# Patient Record
Sex: FEMALE | Race: WHITE | Employment: OTHER | URBAN - METROPOLITAN AREA
[De-identification: names, ages, dates, MRNs, and addresses within clinical notes are randomized per-mention and may not be internally consistent; named-entity substitution may affect disease eponyms.]

---

## 2017-01-16 ENCOUNTER — TELEPHONE (OUTPATIENT)
Dept: PHARMACY | Facility: CLINIC | Age: 82
End: 2017-01-16

## 2017-01-24 PROBLEM — I50.22 CHRONIC SYSTOLIC (CONGESTIVE) HEART FAILURE (HCC): Status: ACTIVE | Noted: 2017-01-24

## 2018-01-01 ENCOUNTER — DIRECT ADMIT ORDERS (OUTPATIENT)
Dept: CARDIOLOGY CLINIC | Age: 83
End: 2018-01-01

## 2018-01-01 ENCOUNTER — HOSPITAL ENCOUNTER (INPATIENT)
Age: 83
LOS: 3 days | Discharge: HOSPICE | DRG: 308 | End: 2018-07-22
Attending: EMERGENCY MEDICINE | Admitting: INTERNAL MEDICINE
Payer: MEDICARE

## 2018-01-01 ENCOUNTER — APPOINTMENT (OUTPATIENT)
Dept: GENERAL RADIOLOGY | Age: 83
DRG: 291 | End: 2018-01-01
Payer: MEDICARE

## 2018-01-01 ENCOUNTER — APPOINTMENT (OUTPATIENT)
Dept: ULTRASOUND IMAGING | Age: 83
DRG: 308 | End: 2018-01-01
Payer: MEDICARE

## 2018-01-01 ENCOUNTER — OFFICE VISIT (OUTPATIENT)
Dept: CARDIOLOGY CLINIC | Age: 83
End: 2018-01-01
Payer: MEDICARE

## 2018-01-01 ENCOUNTER — APPOINTMENT (OUTPATIENT)
Dept: GENERAL RADIOLOGY | Age: 83
DRG: 242 | End: 2018-01-01
Payer: MEDICARE

## 2018-01-01 ENCOUNTER — HOSPITAL ENCOUNTER (OUTPATIENT)
Dept: OTHER | Age: 83
Setting detail: THERAPIES SERIES
Discharge: HOME | End: 2018-05-21
Payer: COMMERCIAL

## 2018-01-01 ENCOUNTER — HOSPITAL ENCOUNTER (OUTPATIENT)
Age: 83
Discharge: HOME | End: 2018-06-14
Payer: MEDICARE

## 2018-01-01 ENCOUNTER — ANESTHESIA EVENT (OUTPATIENT)
Dept: NON INVASIVE DIAGNOSTICS | Age: 83
End: 2018-01-01

## 2018-01-01 ENCOUNTER — APPOINTMENT (OUTPATIENT)
Dept: GENERAL RADIOLOGY | Age: 83
DRG: 292 | End: 2018-01-01
Payer: MEDICARE

## 2018-01-01 ENCOUNTER — APPOINTMENT (OUTPATIENT)
Dept: GENERAL RADIOLOGY | Age: 83
DRG: 308 | End: 2018-01-01
Payer: MEDICARE

## 2018-01-01 ENCOUNTER — CARE COORDINATION (OUTPATIENT)
Dept: CARE COORDINATION | Age: 83
End: 2018-01-01

## 2018-01-01 ENCOUNTER — OFFICE VISIT (OUTPATIENT)
Dept: NON INVASIVE DIAGNOSTICS | Age: 83
End: 2018-01-01
Payer: MEDICARE

## 2018-01-01 ENCOUNTER — HOSPITAL ENCOUNTER (OUTPATIENT)
Age: 83
Discharge: HOME | End: 2018-05-21
Payer: MEDICARE

## 2018-01-01 ENCOUNTER — HOSPITAL ENCOUNTER (OUTPATIENT)
Dept: NON INVASIVE DIAGNOSTICS | Age: 83
Discharge: HOME | End: 2018-05-29
Payer: MEDICARE

## 2018-01-01 ENCOUNTER — HOSPITAL ENCOUNTER (OUTPATIENT)
Age: 83
Discharge: HOME | End: 2018-07-16
Payer: MEDICARE

## 2018-01-01 ENCOUNTER — HOSPITAL ENCOUNTER (INPATIENT)
Age: 83
LOS: 12 days | Discharge: NURSING, REHAB OR LONG-TERM CARE | DRG: 242 | End: 2018-07-15
Attending: EMERGENCY MEDICINE | Admitting: INTERNAL MEDICINE
Payer: MEDICARE

## 2018-01-01 ENCOUNTER — ANESTHESIA EVENT (OUTPATIENT)
Dept: CARDIAC CATH/INVASIVE PROCEDURES | Age: 83
DRG: 242 | End: 2018-01-01
Payer: MEDICARE

## 2018-01-01 ENCOUNTER — HOSPITAL ENCOUNTER (INPATIENT)
Age: 83
LOS: 6 days | Discharge: ANOTHER HOSPITAL | DRG: 291 | End: 2018-06-20
Attending: INTERNAL MEDICINE | Admitting: INTERNAL MEDICINE
Payer: MEDICARE

## 2018-01-01 ENCOUNTER — HOSPITAL ENCOUNTER (INPATIENT)
Age: 83
LOS: 4 days | Discharge: HOME | DRG: 292 | End: 2018-05-11
Attending: EMERGENCY MEDICINE | Admitting: INTERNAL MEDICINE
Payer: MEDICARE

## 2018-01-01 ENCOUNTER — HOSPITAL ENCOUNTER (OUTPATIENT)
Dept: OTHER | Age: 83
Setting detail: THERAPIES SERIES
Discharge: HOME | DRG: 242 | End: 2018-07-10
Payer: MEDICARE

## 2018-01-01 ENCOUNTER — ANESTHESIA (OUTPATIENT)
Dept: NON INVASIVE DIAGNOSTICS | Age: 83
End: 2018-01-01

## 2018-01-01 ENCOUNTER — HOSPITAL ENCOUNTER (INPATIENT)
Age: 83
LOS: 7 days | Discharge: ANOTHER HOSPITAL | DRG: 291 | End: 2018-06-14
Attending: EMERGENCY MEDICINE | Admitting: INTERNAL MEDICINE
Payer: MEDICARE

## 2018-01-01 ENCOUNTER — APPOINTMENT (OUTPATIENT)
Dept: CARDIAC CATH/INVASIVE PROCEDURES | Age: 83
DRG: 242 | End: 2018-01-01
Payer: MEDICARE

## 2018-01-01 ENCOUNTER — TELEPHONE (OUTPATIENT)
Dept: CARDIOLOGY CLINIC | Age: 83
End: 2018-01-01

## 2018-01-01 ENCOUNTER — HOSPITAL ENCOUNTER (OUTPATIENT)
Dept: OTHER | Age: 83
Setting detail: THERAPIES SERIES
Discharge: HOME | DRG: 242 | End: 2018-07-02
Payer: MEDICARE

## 2018-01-01 ENCOUNTER — TELEPHONE (OUTPATIENT)
Dept: PHARMACY | Facility: CLINIC | Age: 83
End: 2018-01-01

## 2018-01-01 ENCOUNTER — ANESTHESIA (OUTPATIENT)
Dept: CARDIAC CATH/INVASIVE PROCEDURES | Age: 83
DRG: 242 | End: 2018-01-01
Payer: MEDICARE

## 2018-01-01 ENCOUNTER — APPOINTMENT (OUTPATIENT)
Dept: GENERAL RADIOLOGY | Age: 83
DRG: 291 | End: 2018-01-01
Attending: INTERNAL MEDICINE
Payer: MEDICARE

## 2018-01-01 ENCOUNTER — HOSPITAL ENCOUNTER (OUTPATIENT)
Age: 83
Discharge: HOME | End: 2018-07-18
Payer: MEDICARE

## 2018-01-01 VITALS
SYSTOLIC BLOOD PRESSURE: 80 MMHG | HEIGHT: 70 IN | BODY MASS INDEX: 25.91 KG/M2 | HEART RATE: 110 BPM | WEIGHT: 181 LBS | DIASTOLIC BLOOD PRESSURE: 60 MMHG | RESPIRATION RATE: 14 BRPM

## 2018-01-01 VITALS
RESPIRATION RATE: 46 BRPM | DIASTOLIC BLOOD PRESSURE: 48 MMHG | OXYGEN SATURATION: 99 % | SYSTOLIC BLOOD PRESSURE: 90 MMHG

## 2018-01-01 VITALS
WEIGHT: 170 LBS | RESPIRATION RATE: 16 BRPM | SYSTOLIC BLOOD PRESSURE: 114 MMHG | DIASTOLIC BLOOD PRESSURE: 60 MMHG | HEART RATE: 74 BPM | HEIGHT: 70 IN | BODY MASS INDEX: 24.34 KG/M2

## 2018-01-01 VITALS
TEMPERATURE: 97.4 F | SYSTOLIC BLOOD PRESSURE: 104 MMHG | RESPIRATION RATE: 16 BRPM | WEIGHT: 183 LBS | HEIGHT: 70 IN | OXYGEN SATURATION: 98 % | HEART RATE: 112 BPM | DIASTOLIC BLOOD PRESSURE: 63 MMHG | BODY MASS INDEX: 26.2 KG/M2

## 2018-01-01 VITALS
WEIGHT: 177 LBS | OXYGEN SATURATION: 98 % | HEIGHT: 70 IN | HEART RATE: 83 BPM | DIASTOLIC BLOOD PRESSURE: 56 MMHG | RESPIRATION RATE: 18 BRPM | BODY MASS INDEX: 25.34 KG/M2 | SYSTOLIC BLOOD PRESSURE: 94 MMHG

## 2018-01-01 VITALS
TEMPERATURE: 98.1 F | RESPIRATION RATE: 18 BRPM | BODY MASS INDEX: 25.07 KG/M2 | HEART RATE: 91 BPM | HEIGHT: 70 IN | WEIGHT: 175.1 LBS | OXYGEN SATURATION: 93 % | SYSTOLIC BLOOD PRESSURE: 91 MMHG | DIASTOLIC BLOOD PRESSURE: 71 MMHG

## 2018-01-01 VITALS
OXYGEN SATURATION: 100 % | RESPIRATION RATE: 16 BRPM | HEIGHT: 70 IN | WEIGHT: 198 LBS | TEMPERATURE: 97.4 F | BODY MASS INDEX: 28.35 KG/M2 | SYSTOLIC BLOOD PRESSURE: 95 MMHG | HEART RATE: 46 BPM | DIASTOLIC BLOOD PRESSURE: 58 MMHG

## 2018-01-01 VITALS
TEMPERATURE: 98 F | HEIGHT: 70 IN | OXYGEN SATURATION: 98 % | HEART RATE: 95 BPM | RESPIRATION RATE: 20 BRPM | WEIGHT: 175.3 LBS | DIASTOLIC BLOOD PRESSURE: 56 MMHG | BODY MASS INDEX: 25.09 KG/M2 | SYSTOLIC BLOOD PRESSURE: 82 MMHG

## 2018-01-01 VITALS
HEART RATE: 90 BPM | RESPIRATION RATE: 20 BRPM | SYSTOLIC BLOOD PRESSURE: 97 MMHG | DIASTOLIC BLOOD PRESSURE: 57 MMHG | WEIGHT: 181 LBS | BODY MASS INDEX: 25.97 KG/M2

## 2018-01-01 VITALS
BODY MASS INDEX: 25.91 KG/M2 | TEMPERATURE: 98.1 F | RESPIRATION RATE: 16 BRPM | HEART RATE: 82 BPM | WEIGHT: 181 LBS | DIASTOLIC BLOOD PRESSURE: 67 MMHG | SYSTOLIC BLOOD PRESSURE: 97 MMHG | OXYGEN SATURATION: 94 % | HEIGHT: 70 IN

## 2018-01-01 VITALS
TEMPERATURE: 97.8 F | SYSTOLIC BLOOD PRESSURE: 102 MMHG | HEIGHT: 70 IN | BODY MASS INDEX: 24.34 KG/M2 | HEART RATE: 56 BPM | OXYGEN SATURATION: 94 % | WEIGHT: 170 LBS | RESPIRATION RATE: 21 BRPM | DIASTOLIC BLOOD PRESSURE: 54 MMHG

## 2018-01-01 VITALS
SYSTOLIC BLOOD PRESSURE: 91 MMHG | DIASTOLIC BLOOD PRESSURE: 62 MMHG | OXYGEN SATURATION: 100 % | RESPIRATION RATE: 18 BRPM

## 2018-01-01 DIAGNOSIS — I50.9 ACUTE CONGESTIVE HEART FAILURE, UNSPECIFIED CONGESTIVE HEART FAILURE TYPE: ICD-10-CM

## 2018-01-01 DIAGNOSIS — I34.0 SEVERE MITRAL REGURGITATION: ICD-10-CM

## 2018-01-01 DIAGNOSIS — I48.19 PERSISTENT ATRIAL FIBRILLATION (HCC): Primary | ICD-10-CM

## 2018-01-01 DIAGNOSIS — J90 PLEURAL EFFUSION, BILATERAL: ICD-10-CM

## 2018-01-01 DIAGNOSIS — R57.0 CARDIOGENIC SHOCK (HCC): ICD-10-CM

## 2018-01-01 DIAGNOSIS — N18.4 CKD (CHRONIC KIDNEY DISEASE) STAGE 4, GFR 15-29 ML/MIN (HCC): ICD-10-CM

## 2018-01-01 DIAGNOSIS — I48.91 ATRIAL FIBRILLATION WITH RAPID VENTRICULAR RESPONSE (HCC): Primary | ICD-10-CM

## 2018-01-01 DIAGNOSIS — I49.5 SINUS NODE DYSFUNCTION (HCC): ICD-10-CM

## 2018-01-01 DIAGNOSIS — D64.9 CHRONIC ANEMIA: ICD-10-CM

## 2018-01-01 DIAGNOSIS — N17.9 AKI (ACUTE KIDNEY INJURY) (HCC): ICD-10-CM

## 2018-01-01 DIAGNOSIS — N18.4 CHRONIC RENAL FAILURE, STAGE 4 (SEVERE) (HCC): ICD-10-CM

## 2018-01-01 DIAGNOSIS — R57.9 SHOCK (HCC): ICD-10-CM

## 2018-01-01 DIAGNOSIS — I48.0 PAROXYSMAL ATRIAL FIBRILLATION (HCC): Primary | ICD-10-CM

## 2018-01-01 DIAGNOSIS — I50.43 ACUTE ON CHRONIC COMBINED SYSTOLIC AND DIASTOLIC CHF (CONGESTIVE HEART FAILURE) (HCC): ICD-10-CM

## 2018-01-01 DIAGNOSIS — I42.0 DILATED CARDIOMYOPATHY (HCC): ICD-10-CM

## 2018-01-01 DIAGNOSIS — I50.43 ACUTE ON CHRONIC COMBINED SYSTOLIC AND DIASTOLIC CHF (CONGESTIVE HEART FAILURE) (HCC): Primary | ICD-10-CM

## 2018-01-01 DIAGNOSIS — I48.0 PAF (PAROXYSMAL ATRIAL FIBRILLATION) (HCC): ICD-10-CM

## 2018-01-01 DIAGNOSIS — I48.0 PAROXYSMAL ATRIAL FIBRILLATION (HCC): ICD-10-CM

## 2018-01-01 DIAGNOSIS — R77.8 ELEVATED TROPONIN: ICD-10-CM

## 2018-01-01 DIAGNOSIS — N39.0 URINARY TRACT INFECTION WITHOUT HEMATURIA, SITE UNSPECIFIED: Primary | ICD-10-CM

## 2018-01-01 DIAGNOSIS — I50.23 ACUTE ON CHRONIC SYSTOLIC HEART FAILURE (HCC): Primary | ICD-10-CM

## 2018-01-01 DIAGNOSIS — I48.19 PERSISTENT ATRIAL FIBRILLATION (HCC): ICD-10-CM

## 2018-01-01 DIAGNOSIS — E87.5 POTASSIUM (K) EXCESS: ICD-10-CM

## 2018-01-01 DIAGNOSIS — E87.5 HYPERKALEMIA: ICD-10-CM

## 2018-01-01 DIAGNOSIS — I50.9 CONGESTIVE HEART FAILURE, UNSPECIFIED CONGESTIVE HEART FAILURE CHRONICITY, UNSPECIFIED CONGESTIVE HEART FAILURE TYPE: ICD-10-CM

## 2018-01-01 DIAGNOSIS — I44.7 LEFT BUNDLE BRANCH BLOCK: ICD-10-CM

## 2018-01-01 DIAGNOSIS — E03.9 HYPOTHYROIDISM, UNSPECIFIED TYPE: ICD-10-CM

## 2018-01-01 DIAGNOSIS — I50.9 CONGESTIVE HEART FAILURE, UNSPECIFIED CONGESTIVE HEART FAILURE CHRONICITY, UNSPECIFIED CONGESTIVE HEART FAILURE TYPE: Primary | ICD-10-CM

## 2018-01-01 DIAGNOSIS — I95.9 HYPOTENSION, UNSPECIFIED HYPOTENSION TYPE: ICD-10-CM

## 2018-01-01 DIAGNOSIS — I10 ESSENTIAL HYPERTENSION: ICD-10-CM

## 2018-01-01 DIAGNOSIS — N18.9 CHRONIC KIDNEY DISEASE, UNSPECIFIED CKD STAGE: ICD-10-CM

## 2018-01-01 DIAGNOSIS — E87.20 LACTIC ACIDOSIS: ICD-10-CM

## 2018-01-01 DIAGNOSIS — I50.9 ACUTE ON CHRONIC CONGESTIVE HEART FAILURE, UNSPECIFIED CONGESTIVE HEART FAILURE TYPE: ICD-10-CM

## 2018-01-01 DIAGNOSIS — I42.8 NONISCHEMIC CARDIOMYOPATHY (HCC): ICD-10-CM

## 2018-01-01 DIAGNOSIS — I47.20 VENTRICULAR TACHYCARDIA: Primary | ICD-10-CM

## 2018-01-01 LAB
ALBUMIN SERPL-MCNC: 2.7 G/DL (ref 3.5–5.2)
ALBUMIN SERPL-MCNC: 2.8 G/DL (ref 3.5–5.2)
ALBUMIN SERPL-MCNC: 2.9 G/DL (ref 3.5–5.2)
ALBUMIN SERPL-MCNC: 3 G/DL (ref 3.5–5.2)
ALBUMIN SERPL-MCNC: 3.1 G/DL (ref 3.5–5.2)
ALBUMIN SERPL-MCNC: 3.2 G/DL (ref 3.5–5.2)
ALBUMIN SERPL-MCNC: 3.3 G/DL (ref 3.5–5.2)
ALBUMIN SERPL-MCNC: 3.4 G/DL (ref 3.5–5.2)
ALBUMIN SERPL-MCNC: 3.5 G/DL (ref 3.5–5.2)
ALBUMIN SERPL-MCNC: 3.6 G/DL (ref 3.5–5.2)
ALBUMIN SERPL-MCNC: 3.9 G/DL (ref 3.5–5.2)
ALBUMIN SERPL-MCNC: 4.1 G/DL (ref 3.5–5.2)
ALP BLD-CCNC: 44 U/L (ref 35–104)
ALP BLD-CCNC: 46 U/L (ref 35–104)
ALP BLD-CCNC: 46 U/L (ref 35–104)
ALP BLD-CCNC: 47 U/L (ref 35–104)
ALP BLD-CCNC: 47 U/L (ref 35–104)
ALP BLD-CCNC: 48 U/L (ref 35–104)
ALP BLD-CCNC: 49 U/L (ref 35–104)
ALP BLD-CCNC: 50 U/L (ref 35–104)
ALP BLD-CCNC: 51 U/L (ref 35–104)
ALP BLD-CCNC: 52 U/L (ref 35–104)
ALP BLD-CCNC: 54 U/L (ref 35–104)
ALP BLD-CCNC: 59 U/L (ref 35–104)
ALP BLD-CCNC: 61 U/L (ref 35–104)
ALP BLD-CCNC: 61 U/L (ref 35–104)
ALP BLD-CCNC: 63 U/L (ref 35–104)
ALP BLD-CCNC: 65 U/L (ref 35–104)
ALP BLD-CCNC: 67 U/L (ref 35–104)
ALP BLD-CCNC: 68 U/L (ref 35–104)
ALP BLD-CCNC: 68 U/L (ref 35–104)
ALP BLD-CCNC: 77 U/L (ref 35–104)
ALP BLD-CCNC: 78 U/L (ref 35–104)
ALT SERPL-CCNC: 116 U/L (ref 0–32)
ALT SERPL-CCNC: 15 U/L (ref 0–32)
ALT SERPL-CCNC: 16 U/L (ref 0–32)
ALT SERPL-CCNC: 16 U/L (ref 0–32)
ALT SERPL-CCNC: 164 U/L (ref 0–32)
ALT SERPL-CCNC: 17 U/L (ref 0–32)
ALT SERPL-CCNC: 18 U/L (ref 0–32)
ALT SERPL-CCNC: 20 U/L (ref 0–32)
ALT SERPL-CCNC: 20 U/L (ref 0–32)
ALT SERPL-CCNC: 201 U/L (ref 0–32)
ALT SERPL-CCNC: 22 U/L (ref 0–32)
ALT SERPL-CCNC: 23 U/L (ref 0–32)
ALT SERPL-CCNC: 25 U/L (ref 0–32)
ALT SERPL-CCNC: 25 U/L (ref 0–32)
ALT SERPL-CCNC: 265 U/L (ref 0–32)
ALT SERPL-CCNC: 27 U/L (ref 0–32)
ALT SERPL-CCNC: 31 U/L (ref 0–32)
ALT SERPL-CCNC: 34 U/L (ref 0–32)
ALT SERPL-CCNC: 341 U/L (ref 0–32)
ALT SERPL-CCNC: 44 U/L (ref 0–32)
ALT SERPL-CCNC: 440 U/L (ref 0–32)
ALT SERPL-CCNC: 54 U/L (ref 0–32)
ALT SERPL-CCNC: 549 U/L (ref 0–32)
ALT SERPL-CCNC: 587 U/L (ref 0–32)
ANION GAP SERPL CALCULATED.3IONS-SCNC: 10 MMOL/L (ref 7–16)
ANION GAP SERPL CALCULATED.3IONS-SCNC: 10 MMOL/L (ref 7–16)
ANION GAP SERPL CALCULATED.3IONS-SCNC: 11 MMOL/L (ref 7–16)
ANION GAP SERPL CALCULATED.3IONS-SCNC: 12 MMOL/L (ref 7–16)
ANION GAP SERPL CALCULATED.3IONS-SCNC: 13 MMOL/L (ref 7–16)
ANION GAP SERPL CALCULATED.3IONS-SCNC: 14 MMOL/L (ref 7–16)
ANION GAP SERPL CALCULATED.3IONS-SCNC: 15 MMOL/L (ref 7–16)
ANION GAP SERPL CALCULATED.3IONS-SCNC: 16 MMOL/L (ref 7–16)
ANION GAP SERPL CALCULATED.3IONS-SCNC: 17 MMOL/L (ref 7–16)
ANION GAP SERPL CALCULATED.3IONS-SCNC: 18 MMOL/L (ref 7–16)
ANION GAP SERPL CALCULATED.3IONS-SCNC: 19 MMOL/L (ref 7–16)
ANION GAP SERPL CALCULATED.3IONS-SCNC: 19 MMOL/L (ref 7–16)
ANION GAP SERPL CALCULATED.3IONS-SCNC: 22 MMOL/L (ref 7–16)
ANION GAP SERPL CALCULATED.3IONS-SCNC: 23 MMOL/L (ref 7–16)
ANION GAP SERPL CALCULATED.3IONS-SCNC: 27 MMOL/L (ref 7–16)
APTT: 31.1 SEC (ref 24.5–35.1)
AST SERPL-CCNC: 107 U/L (ref 0–31)
AST SERPL-CCNC: 159 U/L (ref 0–31)
AST SERPL-CCNC: 18 U/L (ref 0–31)
AST SERPL-CCNC: 18 U/L (ref 0–31)
AST SERPL-CCNC: 19 U/L (ref 0–31)
AST SERPL-CCNC: 20 U/L (ref 0–31)
AST SERPL-CCNC: 21 U/L (ref 0–31)
AST SERPL-CCNC: 21 U/L (ref 0–31)
AST SERPL-CCNC: 23 U/L (ref 0–31)
AST SERPL-CCNC: 25 U/L (ref 0–31)
AST SERPL-CCNC: 276 U/L (ref 0–31)
AST SERPL-CCNC: 30 U/L (ref 0–31)
AST SERPL-CCNC: 32 U/L (ref 0–31)
AST SERPL-CCNC: 33 U/L (ref 0–31)
AST SERPL-CCNC: 34 U/L (ref 0–31)
AST SERPL-CCNC: 40 U/L (ref 0–31)
AST SERPL-CCNC: 42 U/L (ref 0–31)
AST SERPL-CCNC: 44 U/L (ref 0–31)
AST SERPL-CCNC: 479 U/L (ref 0–31)
AST SERPL-CCNC: 49 U/L (ref 0–31)
AST SERPL-CCNC: 56 U/L (ref 0–31)
AST SERPL-CCNC: 66 U/L (ref 0–31)
AST SERPL-CCNC: 733 U/L (ref 0–31)
AST SERPL-CCNC: 79 U/L (ref 0–31)
BACTERIA: ABNORMAL /HPF
BASOPHILS ABSOLUTE: 0.01 E9/L (ref 0–0.2)
BASOPHILS ABSOLUTE: 0.02 E9/L (ref 0–0.2)
BASOPHILS ABSOLUTE: 0.03 E9/L (ref 0–0.2)
BASOPHILS ABSOLUTE: 0.04 E9/L (ref 0–0.2)
BASOPHILS RELATIVE PERCENT: 0.2 % (ref 0–2)
BASOPHILS RELATIVE PERCENT: 0.2 % (ref 0–2)
BASOPHILS RELATIVE PERCENT: 0.3 % (ref 0–2)
BASOPHILS RELATIVE PERCENT: 0.3 % (ref 0–2)
BASOPHILS RELATIVE PERCENT: 0.4 % (ref 0–2)
BASOPHILS RELATIVE PERCENT: 0.5 % (ref 0–2)
BASOPHILS RELATIVE PERCENT: 0.6 % (ref 0–2)
BASOPHILS RELATIVE PERCENT: 0.7 % (ref 0–2)
BASOPHILS RELATIVE PERCENT: 0.8 % (ref 0–2)
BASOPHILS RELATIVE PERCENT: 0.9 % (ref 0–2)
BILIRUB SERPL-MCNC: 0.5 MG/DL (ref 0–1.2)
BILIRUB SERPL-MCNC: 0.6 MG/DL (ref 0–1.2)
BILIRUB SERPL-MCNC: 0.7 MG/DL (ref 0–1.2)
BILIRUB SERPL-MCNC: 0.8 MG/DL (ref 0–1.2)
BILIRUB SERPL-MCNC: 0.8 MG/DL (ref 0–1.2)
BILIRUB SERPL-MCNC: 1.1 MG/DL (ref 0–1.2)
BILIRUBIN URINE: ABNORMAL
BILIRUBIN URINE: NEGATIVE
BILIRUBIN URINE: NEGATIVE
BLOOD CULTURE, ROUTINE: NORMAL
BLOOD, URINE: ABNORMAL
BUN BLDV-MCNC: 42 MG/DL (ref 8–23)
BUN BLDV-MCNC: 43 MG/DL (ref 8–23)
BUN BLDV-MCNC: 45 MG/DL (ref 8–23)
BUN BLDV-MCNC: 52 MG/DL (ref 8–23)
BUN BLDV-MCNC: 55 MG/DL (ref 8–23)
BUN BLDV-MCNC: 57 MG/DL (ref 8–23)
BUN BLDV-MCNC: 57 MG/DL (ref 8–23)
BUN BLDV-MCNC: 58 MG/DL (ref 8–23)
BUN BLDV-MCNC: 59 MG/DL (ref 8–23)
BUN BLDV-MCNC: 60 MG/DL (ref 8–23)
BUN BLDV-MCNC: 61 MG/DL (ref 8–23)
BUN BLDV-MCNC: 61 MG/DL (ref 8–23)
BUN BLDV-MCNC: 62 MG/DL (ref 8–23)
BUN BLDV-MCNC: 62 MG/DL (ref 8–23)
BUN BLDV-MCNC: 64 MG/DL (ref 8–23)
BUN BLDV-MCNC: 65 MG/DL (ref 8–23)
BUN BLDV-MCNC: 66 MG/DL (ref 8–23)
BUN BLDV-MCNC: 72 MG/DL (ref 8–23)
BUN BLDV-MCNC: 72 MG/DL (ref 8–23)
BUN BLDV-MCNC: 73 MG/DL (ref 8–23)
BUN BLDV-MCNC: 74 MG/DL (ref 8–23)
BUN BLDV-MCNC: 74 MG/DL (ref 8–23)
BUN BLDV-MCNC: 77 MG/DL (ref 8–23)
BUN BLDV-MCNC: 78 MG/DL (ref 8–23)
BUN BLDV-MCNC: 79 MG/DL (ref 8–23)
BUN BLDV-MCNC: 80 MG/DL (ref 8–23)
BUN BLDV-MCNC: 81 MG/DL (ref 8–23)
BUN BLDV-MCNC: 83 MG/DL (ref 8–23)
BUN BLDV-MCNC: 83 MG/DL (ref 8–23)
BUN BLDV-MCNC: 84 MG/DL (ref 8–23)
BUN BLDV-MCNC: 85 MG/DL (ref 8–23)
BUN BLDV-MCNC: 87 MG/DL (ref 8–23)
BUN BLDV-MCNC: 88 MG/DL (ref 8–23)
BUN BLDV-MCNC: 90 MG/DL (ref 8–23)
BUN BLDV-MCNC: 90 MG/DL (ref 8–23)
BUN BLDV-MCNC: 93 MG/DL (ref 8–23)
BUN BLDV-MCNC: 95 MG/DL (ref 8–23)
BUN BLDV-MCNC: 95 MG/DL (ref 8–23)
BUN BLDV-MCNC: 96 MG/DL (ref 8–23)
BUN BLDV-MCNC: 97 MG/DL (ref 8–23)
BUN BLDV-MCNC: 99 MG/DL (ref 8–23)
CALCIUM SERPL-MCNC: 8.1 MG/DL (ref 8.6–10.2)
CALCIUM SERPL-MCNC: 8.2 MG/DL (ref 8.6–10.2)
CALCIUM SERPL-MCNC: 8.2 MG/DL (ref 8.6–10.2)
CALCIUM SERPL-MCNC: 8.3 MG/DL (ref 8.6–10.2)
CALCIUM SERPL-MCNC: 8.4 MG/DL (ref 8.6–10.2)
CALCIUM SERPL-MCNC: 8.5 MG/DL (ref 8.6–10.2)
CALCIUM SERPL-MCNC: 8.6 MG/DL (ref 8.6–10.2)
CALCIUM SERPL-MCNC: 8.7 MG/DL (ref 8.6–10.2)
CALCIUM SERPL-MCNC: 8.8 MG/DL (ref 8.6–10.2)
CALCIUM SERPL-MCNC: 8.8 MG/DL (ref 8.6–10.2)
CALCIUM SERPL-MCNC: 8.9 MG/DL (ref 8.6–10.2)
CALCIUM SERPL-MCNC: 9 MG/DL (ref 8.6–10.2)
CALCIUM SERPL-MCNC: 9 MG/DL (ref 8.6–10.2)
CALCIUM SERPL-MCNC: 9.1 MG/DL (ref 8.6–10.2)
CALCIUM SERPL-MCNC: 9.2 MG/DL (ref 8.6–10.2)
CALCIUM SERPL-MCNC: 9.4 MG/DL (ref 8.6–10.2)
CALCIUM SERPL-MCNC: 9.4 MG/DL (ref 8.6–10.2)
CALCIUM SERPL-MCNC: 9.6 MG/DL (ref 8.6–10.2)
CHLORIDE BLD-SCNC: 100 MMOL/L (ref 98–107)
CHLORIDE BLD-SCNC: 101 MMOL/L (ref 98–107)
CHLORIDE BLD-SCNC: 102 MMOL/L (ref 98–107)
CHLORIDE BLD-SCNC: 89 MMOL/L (ref 98–107)
CHLORIDE BLD-SCNC: 90 MMOL/L (ref 98–107)
CHLORIDE BLD-SCNC: 91 MMOL/L (ref 98–107)
CHLORIDE BLD-SCNC: 92 MMOL/L (ref 98–107)
CHLORIDE BLD-SCNC: 93 MMOL/L (ref 98–107)
CHLORIDE BLD-SCNC: 94 MMOL/L (ref 98–107)
CHLORIDE BLD-SCNC: 95 MMOL/L (ref 98–107)
CHLORIDE BLD-SCNC: 96 MMOL/L (ref 98–107)
CHLORIDE BLD-SCNC: 97 MMOL/L (ref 98–107)
CHLORIDE BLD-SCNC: 98 MMOL/L (ref 98–107)
CHLORIDE BLD-SCNC: 99 MMOL/L (ref 98–107)
CHLORIDE URINE RANDOM: 56 MMOL/L
CHLORIDE URINE RANDOM: 59 MMOL/L
CHLORIDE URINE RANDOM: <20 MMOL/L
CHLORIDE URINE RANDOM: <20 MMOL/L
CK MB: 2.4 NG/ML (ref 0–4.3)
CK MB: 3 NG/ML (ref 0–4.3)
CLARITY: ABNORMAL
CLARITY: ABNORMAL
CLARITY: CLEAR
CO2: 19 MMOL/L (ref 22–29)
CO2: 20 MMOL/L (ref 22–29)
CO2: 20 MMOL/L (ref 22–29)
CO2: 21 MMOL/L (ref 22–29)
CO2: 21 MMOL/L (ref 22–29)
CO2: 22 MMOL/L (ref 22–29)
CO2: 22 MMOL/L (ref 22–29)
CO2: 23 MMOL/L (ref 22–29)
CO2: 24 MMOL/L (ref 22–29)
CO2: 24 MMOL/L (ref 22–29)
CO2: 25 MMOL/L (ref 22–29)
CO2: 26 MMOL/L (ref 22–29)
CO2: 27 MMOL/L (ref 22–29)
CO2: 27 MMOL/L (ref 22–29)
CO2: 28 MMOL/L (ref 22–29)
CO2: 29 MMOL/L (ref 22–29)
CO2: 30 MMOL/L (ref 22–29)
CO2: 31 MMOL/L (ref 22–29)
CO2: 32 MMOL/L (ref 22–29)
CO2: 33 MMOL/L (ref 22–29)
CO2: 33 MMOL/L (ref 22–29)
CO2: 35 MMOL/L (ref 22–29)
COLOR: ABNORMAL
COLOR: YELLOW
COLOR: YELLOW
CORTISOL TOTAL: 30.04 MCG/DL (ref 2.68–18.4)
CREAT SERPL-MCNC: 1.7 MG/DL (ref 0.5–1)
CREAT SERPL-MCNC: 1.7 MG/DL (ref 0.5–1)
CREAT SERPL-MCNC: 1.8 MG/DL (ref 0.5–1)
CREAT SERPL-MCNC: 1.9 MG/DL (ref 0.5–1)
CREAT SERPL-MCNC: 2 MG/DL (ref 0.5–1)
CREAT SERPL-MCNC: 2 MG/DL (ref 0.5–1)
CREAT SERPL-MCNC: 2.1 MG/DL (ref 0.5–1)
CREAT SERPL-MCNC: 2.2 MG/DL (ref 0.5–1)
CREAT SERPL-MCNC: 2.2 MG/DL (ref 0.5–1)
CREAT SERPL-MCNC: 2.3 MG/DL (ref 0.5–1)
CREAT SERPL-MCNC: 2.4 MG/DL (ref 0.5–1)
CREAT SERPL-MCNC: 2.5 MG/DL (ref 0.5–1)
CREAT SERPL-MCNC: 2.6 MG/DL (ref 0.5–1)
CREAT SERPL-MCNC: 2.7 MG/DL (ref 0.5–1)
CREAT SERPL-MCNC: 2.8 MG/DL (ref 0.5–1)
CREAT SERPL-MCNC: 2.9 MG/DL (ref 0.5–1)
CREAT SERPL-MCNC: 2.9 MG/DL (ref 0.5–1)
CREAT SERPL-MCNC: 3 MG/DL (ref 0.5–1)
CREAT SERPL-MCNC: 3 MG/DL (ref 0.5–1)
CREAT SERPL-MCNC: 3.1 MG/DL (ref 0.5–1)
CREAT SERPL-MCNC: 3.1 MG/DL (ref 0.5–1)
CREAT SERPL-MCNC: 3.3 MG/DL (ref 0.5–1)
CREAT SERPL-MCNC: 3.4 MG/DL (ref 0.5–1)
CREAT SERPL-MCNC: 3.4 MG/DL (ref 0.5–1)
CREAT SERPL-MCNC: 3.5 MG/DL (ref 0.5–1)
CREAT SERPL-MCNC: 3.6 MG/DL (ref 0.5–1)
CREAT SERPL-MCNC: 3.6 MG/DL (ref 0.5–1)
CREAT SERPL-MCNC: 3.7 MG/DL (ref 0.5–1)
CREAT SERPL-MCNC: 3.9 MG/DL (ref 0.5–1)
CREAT SERPL-MCNC: 3.9 MG/DL (ref 0.5–1)
CREATININE URINE: 129 MG/DL (ref 29–226)
CREATININE URINE: 152 MG/DL (ref 29–226)
CREATININE URINE: 42 MG/DL (ref 29–226)
CREATININE URINE: 47 MG/DL (ref 29–226)
CREATININE URINE: 47 MG/DL (ref 29–226)
CULTURE, BLOOD 2: NORMAL
EKG ATRIAL RATE: 101 BPM
EKG ATRIAL RATE: 104 BPM
EKG ATRIAL RATE: 119 BPM
EKG ATRIAL RATE: 120 BPM
EKG ATRIAL RATE: 138 BPM
EKG ATRIAL RATE: 34 BPM
EKG ATRIAL RATE: 40 BPM
EKG ATRIAL RATE: 49 BPM
EKG ATRIAL RATE: 535 BPM
EKG ATRIAL RATE: 55 BPM
EKG ATRIAL RATE: 57 BPM
EKG ATRIAL RATE: 59 BPM
EKG ATRIAL RATE: 64 BPM
EKG ATRIAL RATE: 65 BPM
EKG ATRIAL RATE: 71 BPM
EKG ATRIAL RATE: 82 BPM
EKG ATRIAL RATE: 83 BPM
EKG P AXIS: 30 DEGREES
EKG P AXIS: 58 DEGREES
EKG P AXIS: 71 DEGREES
EKG P AXIS: 76 DEGREES
EKG P-R INTERVAL: 218 MS
EKG P-R INTERVAL: 242 MS
EKG P-R INTERVAL: 248 MS
EKG P-R INTERVAL: 264 MS
EKG Q-T INTERVAL: 358 MS
EKG Q-T INTERVAL: 382 MS
EKG Q-T INTERVAL: 392 MS
EKG Q-T INTERVAL: 398 MS
EKG Q-T INTERVAL: 410 MS
EKG Q-T INTERVAL: 450 MS
EKG Q-T INTERVAL: 454 MS
EKG Q-T INTERVAL: 472 MS
EKG Q-T INTERVAL: 474 MS
EKG Q-T INTERVAL: 474 MS
EKG Q-T INTERVAL: 484 MS
EKG Q-T INTERVAL: 488 MS
EKG Q-T INTERVAL: 516 MS
EKG Q-T INTERVAL: 536 MS
EKG Q-T INTERVAL: 538 MS
EKG Q-T INTERVAL: 550 MS
EKG Q-T INTERVAL: 590 MS
EKG QRS DURATION: 152 MS
EKG QRS DURATION: 156 MS
EKG QRS DURATION: 162 MS
EKG QRS DURATION: 168 MS
EKG QRS DURATION: 170 MS
EKG QRS DURATION: 174 MS
EKG QRS DURATION: 178 MS
EKG QRS DURATION: 178 MS
EKG QRS DURATION: 180 MS
EKG QRS DURATION: 180 MS
EKG QRS DURATION: 182 MS
EKG QRS DURATION: 184 MS
EKG QRS DURATION: 186 MS
EKG QRS DURATION: 190 MS
EKG QRS DURATION: 192 MS
EKG QRS DURATION: 194 MS
EKG QRS DURATION: 234 MS
EKG QTC CALCULATION (BAZETT): 443 MS
EKG QTC CALCULATION (BAZETT): 487 MS
EKG QTC CALCULATION (BAZETT): 495 MS
EKG QTC CALCULATION (BAZETT): 508 MS
EKG QTC CALCULATION (BAZETT): 520 MS
EKG QTC CALCULATION (BAZETT): 523 MS
EKG QTC CALCULATION (BAZETT): 531 MS
EKG QTC CALCULATION (BAZETT): 532 MS
EKG QTC CALCULATION (BAZETT): 535 MS
EKG QTC CALCULATION (BAZETT): 548 MS
EKG QTC CALCULATION (BAZETT): 553 MS
EKG QTC CALCULATION (BAZETT): 554 MS
EKG QTC CALCULATION (BAZETT): 561 MS
EKG QTC CALCULATION (BAZETT): 565 MS
EKG QTC CALCULATION (BAZETT): 569 MS
EKG QTC CALCULATION (BAZETT): 576 MS
EKG QTC CALCULATION (BAZETT): 614 MS
EKG R AXIS: -122 DEGREES
EKG R AXIS: -125 DEGREES
EKG R AXIS: -134 DEGREES
EKG R AXIS: -46 DEGREES
EKG R AXIS: -48 DEGREES
EKG R AXIS: -48 DEGREES
EKG R AXIS: -49 DEGREES
EKG R AXIS: -50 DEGREES
EKG R AXIS: -52 DEGREES
EKG R AXIS: -52 DEGREES
EKG R AXIS: -55 DEGREES
EKG R AXIS: -56 DEGREES
EKG R AXIS: -58 DEGREES
EKG R AXIS: -58 DEGREES
EKG R AXIS: -61 DEGREES
EKG R AXIS: -67 DEGREES
EKG R AXIS: -80 DEGREES
EKG T AXIS: -150 DEGREES
EKG T AXIS: -170 DEGREES
EKG T AXIS: 113 DEGREES
EKG T AXIS: 121 DEGREES
EKG T AXIS: 121 DEGREES
EKG T AXIS: 126 DEGREES
EKG T AXIS: 127 DEGREES
EKG T AXIS: 128 DEGREES
EKG T AXIS: 128 DEGREES
EKG T AXIS: 134 DEGREES
EKG T AXIS: 138 DEGREES
EKG T AXIS: 138 DEGREES
EKG T AXIS: 147 DEGREES
EKG T AXIS: 157 DEGREES
EKG T AXIS: 25 DEGREES
EKG T AXIS: 40 DEGREES
EKG T AXIS: 44 DEGREES
EKG VENTRICULAR RATE: 101 BPM
EKG VENTRICULAR RATE: 114 BPM
EKG VENTRICULAR RATE: 120 BPM
EKG VENTRICULAR RATE: 152 BPM
EKG VENTRICULAR RATE: 34 BPM
EKG VENTRICULAR RATE: 54 BPM
EKG VENTRICULAR RATE: 57 BPM
EKG VENTRICULAR RATE: 59 BPM
EKG VENTRICULAR RATE: 62 BPM
EKG VENTRICULAR RATE: 74 BPM
EKG VENTRICULAR RATE: 75 BPM
EKG VENTRICULAR RATE: 82 BPM
EKG VENTRICULAR RATE: 82 BPM
EKG VENTRICULAR RATE: 84 BPM
EKG VENTRICULAR RATE: 92 BPM
EKG VENTRICULAR RATE: 97 BPM
EKG VENTRICULAR RATE: 98 BPM
EOSINOPHILS ABSOLUTE: 0 E9/L (ref 0.05–0.5)
EOSINOPHILS ABSOLUTE: 0.01 E9/L (ref 0.05–0.5)
EOSINOPHILS ABSOLUTE: 0.01 E9/L (ref 0.05–0.5)
EOSINOPHILS ABSOLUTE: 0.02 E9/L (ref 0.05–0.5)
EOSINOPHILS ABSOLUTE: 0.03 E9/L (ref 0.05–0.5)
EOSINOPHILS ABSOLUTE: 0.03 E9/L (ref 0.05–0.5)
EOSINOPHILS ABSOLUTE: 0.04 E9/L (ref 0.05–0.5)
EOSINOPHILS ABSOLUTE: 0.04 E9/L (ref 0.05–0.5)
EOSINOPHILS ABSOLUTE: 0.06 E9/L (ref 0.05–0.5)
EOSINOPHILS ABSOLUTE: 0.07 E9/L (ref 0.05–0.5)
EOSINOPHILS ABSOLUTE: 0.07 E9/L (ref 0.05–0.5)
EOSINOPHILS ABSOLUTE: 0.09 E9/L (ref 0.05–0.5)
EOSINOPHILS ABSOLUTE: 0.1 E9/L (ref 0.05–0.5)
EOSINOPHILS ABSOLUTE: 0.12 E9/L (ref 0.05–0.5)
EOSINOPHILS ABSOLUTE: 0.12 E9/L (ref 0.05–0.5)
EOSINOPHILS ABSOLUTE: 0.13 E9/L (ref 0.05–0.5)
EOSINOPHILS ABSOLUTE: 0.14 E9/L (ref 0.05–0.5)
EOSINOPHILS RELATIVE PERCENT: 0 % (ref 0–6)
EOSINOPHILS RELATIVE PERCENT: 0.2 % (ref 0–6)
EOSINOPHILS RELATIVE PERCENT: 0.2 % (ref 0–6)
EOSINOPHILS RELATIVE PERCENT: 0.4 % (ref 0–6)
EOSINOPHILS RELATIVE PERCENT: 0.5 % (ref 0–6)
EOSINOPHILS RELATIVE PERCENT: 0.7 % (ref 0–6)
EOSINOPHILS RELATIVE PERCENT: 0.8 % (ref 0–6)
EOSINOPHILS RELATIVE PERCENT: 0.9 % (ref 0–6)
EOSINOPHILS RELATIVE PERCENT: 1.3 % (ref 0–6)
EOSINOPHILS RELATIVE PERCENT: 1.4 % (ref 0–6)
EOSINOPHILS RELATIVE PERCENT: 1.7 % (ref 0–6)
EOSINOPHILS RELATIVE PERCENT: 1.9 % (ref 0–6)
EOSINOPHILS RELATIVE PERCENT: 2.1 % (ref 0–6)
EOSINOPHILS RELATIVE PERCENT: 2.5 % (ref 0–6)
EOSINOPHILS RELATIVE PERCENT: 2.8 % (ref 0–6)
EOSINOPHILS RELATIVE PERCENT: 2.9 % (ref 0–6)
EOSINOPHILS RELATIVE PERCENT: 2.9 % (ref 0–6)
EOSINOPHILS RELATIVE PERCENT: 3.3 % (ref 0–6)
EOSINOPHILS RELATIVE PERCENT: 3.3 % (ref 0–6)
EPITHELIAL CELLS, UA: ABNORMAL /HPF
FERRITIN: 2906 NG/ML
FERRITIN: 304 NG/ML
FERRITIN: 315 NG/ML
FERRITIN: 496 NG/ML
FOLATE: >20 NG/ML (ref 4.8–24.2)
GFR AFRICAN AMERICAN: 13
GFR AFRICAN AMERICAN: 13
GFR AFRICAN AMERICAN: 14
GFR AFRICAN AMERICAN: 15
GFR AFRICAN AMERICAN: 16
GFR AFRICAN AMERICAN: 17
GFR AFRICAN AMERICAN: 18
GFR AFRICAN AMERICAN: 18
GFR AFRICAN AMERICAN: 19
GFR AFRICAN AMERICAN: 20
GFR AFRICAN AMERICAN: 21
GFR AFRICAN AMERICAN: 22
GFR AFRICAN AMERICAN: 23
GFR AFRICAN AMERICAN: 24
GFR AFRICAN AMERICAN: 26
GFR AFRICAN AMERICAN: 26
GFR AFRICAN AMERICAN: 27
GFR AFRICAN AMERICAN: 29
GFR AFRICAN AMERICAN: 29
GFR AFRICAN AMERICAN: 30
GFR AFRICAN AMERICAN: 32
GFR AFRICAN AMERICAN: 35
GFR AFRICAN AMERICAN: 35
GFR NON-AFRICAN AMERICAN: 11 ML/MIN/1.73
GFR NON-AFRICAN AMERICAN: 11 ML/MIN/1.73
GFR NON-AFRICAN AMERICAN: 12 ML/MIN/1.73
GFR NON-AFRICAN AMERICAN: 13 ML/MIN/1.73
GFR NON-AFRICAN AMERICAN: 14 ML/MIN/1.73
GFR NON-AFRICAN AMERICAN: 15 ML/MIN/1.73
GFR NON-AFRICAN AMERICAN: 16 ML/MIN/1.73
GFR NON-AFRICAN AMERICAN: 17 ML/MIN/1.73
GFR NON-AFRICAN AMERICAN: 18 ML/MIN/1.73
GFR NON-AFRICAN AMERICAN: 19 ML/MIN/1.73
GFR NON-AFRICAN AMERICAN: 20 ML/MIN/1.73
GFR NON-AFRICAN AMERICAN: 21 ML/MIN/1.73
GFR NON-AFRICAN AMERICAN: 21 ML/MIN/1.73
GFR NON-AFRICAN AMERICAN: 22 ML/MIN/1.73
GFR NON-AFRICAN AMERICAN: 24 ML/MIN/1.73
GFR NON-AFRICAN AMERICAN: 24 ML/MIN/1.73
GFR NON-AFRICAN AMERICAN: 25 ML/MIN/1.73
GFR NON-AFRICAN AMERICAN: 27 ML/MIN/1.73
GFR NON-AFRICAN AMERICAN: 29 ML/MIN/1.73
GFR NON-AFRICAN AMERICAN: 29 ML/MIN/1.73
GLUCOSE BLD-MCNC: 101 MG/DL (ref 74–109)
GLUCOSE BLD-MCNC: 101 MG/DL (ref 74–109)
GLUCOSE BLD-MCNC: 102 MG/DL (ref 74–109)
GLUCOSE BLD-MCNC: 103 MG/DL (ref 74–109)
GLUCOSE BLD-MCNC: 103 MG/DL (ref 74–109)
GLUCOSE BLD-MCNC: 105 MG/DL (ref 74–109)
GLUCOSE BLD-MCNC: 106 MG/DL (ref 74–109)
GLUCOSE BLD-MCNC: 108 MG/DL (ref 74–109)
GLUCOSE BLD-MCNC: 109 MG/DL (ref 74–109)
GLUCOSE BLD-MCNC: 109 MG/DL (ref 74–109)
GLUCOSE BLD-MCNC: 113 MG/DL (ref 74–109)
GLUCOSE BLD-MCNC: 117 MG/DL (ref 74–109)
GLUCOSE BLD-MCNC: 118 MG/DL (ref 74–109)
GLUCOSE BLD-MCNC: 119 MG/DL (ref 74–109)
GLUCOSE BLD-MCNC: 121 MG/DL (ref 74–109)
GLUCOSE BLD-MCNC: 123 MG/DL (ref 74–109)
GLUCOSE BLD-MCNC: 125 MG/DL (ref 74–109)
GLUCOSE BLD-MCNC: 125 MG/DL (ref 74–109)
GLUCOSE BLD-MCNC: 126 MG/DL (ref 74–109)
GLUCOSE BLD-MCNC: 126 MG/DL (ref 74–109)
GLUCOSE BLD-MCNC: 129 MG/DL (ref 74–109)
GLUCOSE BLD-MCNC: 130 MG/DL (ref 74–109)
GLUCOSE BLD-MCNC: 133 MG/DL (ref 74–109)
GLUCOSE BLD-MCNC: 135 MG/DL (ref 74–109)
GLUCOSE BLD-MCNC: 135 MG/DL (ref 74–109)
GLUCOSE BLD-MCNC: 138 MG/DL (ref 74–109)
GLUCOSE BLD-MCNC: 140 MG/DL (ref 74–109)
GLUCOSE BLD-MCNC: 145 MG/DL (ref 74–109)
GLUCOSE BLD-MCNC: 146 MG/DL (ref 74–109)
GLUCOSE BLD-MCNC: 163 MG/DL (ref 74–109)
GLUCOSE BLD-MCNC: 173 MG/DL (ref 74–109)
GLUCOSE BLD-MCNC: 191 MG/DL (ref 74–109)
GLUCOSE BLD-MCNC: 82 MG/DL (ref 74–109)
GLUCOSE BLD-MCNC: 84 MG/DL (ref 74–109)
GLUCOSE BLD-MCNC: 85 MG/DL (ref 74–109)
GLUCOSE BLD-MCNC: 85 MG/DL (ref 74–109)
GLUCOSE BLD-MCNC: 86 MG/DL (ref 74–109)
GLUCOSE BLD-MCNC: 87 MG/DL (ref 74–109)
GLUCOSE BLD-MCNC: 88 MG/DL (ref 74–109)
GLUCOSE BLD-MCNC: 90 MG/DL (ref 74–109)
GLUCOSE BLD-MCNC: 91 MG/DL (ref 74–109)
GLUCOSE BLD-MCNC: 92 MG/DL (ref 74–109)
GLUCOSE BLD-MCNC: 93 MG/DL (ref 74–109)
GLUCOSE BLD-MCNC: 96 MG/DL (ref 74–109)
GLUCOSE BLD-MCNC: 97 MG/DL (ref 74–109)
GLUCOSE BLD-MCNC: 98 MG/DL (ref 74–109)
GLUCOSE BLD-MCNC: 99 MG/DL (ref 74–109)
GLUCOSE BLD-MCNC: 99 MG/DL (ref 74–109)
GLUCOSE URINE: 100 MG/DL
GLUCOSE URINE: NEGATIVE MG/DL
GLUCOSE URINE: NEGATIVE MG/DL
HBA1C MFR BLD: ABNORMAL % (ref 4.8–5.9)
HCT VFR BLD CALC: 23.2 % (ref 34–48)
HCT VFR BLD CALC: 25.8 % (ref 34–48)
HCT VFR BLD CALC: 26.5 % (ref 34–48)
HCT VFR BLD CALC: 26.6 % (ref 34–48)
HCT VFR BLD CALC: 26.7 % (ref 34–48)
HCT VFR BLD CALC: 27.3 % (ref 34–48)
HCT VFR BLD CALC: 27.4 % (ref 34–48)
HCT VFR BLD CALC: 28.3 % (ref 34–48)
HCT VFR BLD CALC: 28.7 % (ref 34–48)
HCT VFR BLD CALC: 29 % (ref 34–48)
HCT VFR BLD CALC: 30.5 % (ref 34–48)
HCT VFR BLD CALC: 30.8 % (ref 34–48)
HCT VFR BLD CALC: 31 % (ref 34–48)
HCT VFR BLD CALC: 31.7 % (ref 34–48)
HCT VFR BLD CALC: 31.7 % (ref 34–48)
HCT VFR BLD CALC: 31.8 % (ref 34–48)
HCT VFR BLD CALC: 31.8 % (ref 34–48)
HCT VFR BLD CALC: 31.9 % (ref 34–48)
HCT VFR BLD CALC: 31.9 % (ref 34–48)
HCT VFR BLD CALC: 32.6 % (ref 34–48)
HCT VFR BLD CALC: 32.8 % (ref 34–48)
HCT VFR BLD CALC: 32.8 % (ref 34–48)
HCT VFR BLD CALC: 33.1 % (ref 34–48)
HCT VFR BLD CALC: 33.2 % (ref 34–48)
HCT VFR BLD CALC: 33.4 % (ref 34–48)
HCT VFR BLD CALC: 34.1 % (ref 34–48)
HCT VFR BLD CALC: 34.4 % (ref 34–48)
HCT VFR BLD CALC: 35.3 % (ref 34–48)
HCT VFR BLD CALC: 35.5 % (ref 34–48)
HCT VFR BLD CALC: 36.9 % (ref 34–48)
HEMOGLOBIN: 10 G/DL (ref 11.5–15.5)
HEMOGLOBIN: 10 G/DL (ref 11.5–15.5)
HEMOGLOBIN: 10.1 G/DL (ref 11.5–15.5)
HEMOGLOBIN: 10.1 G/DL (ref 11.5–15.5)
HEMOGLOBIN: 10.2 G/DL (ref 11.5–15.5)
HEMOGLOBIN: 10.2 G/DL (ref 11.5–15.5)
HEMOGLOBIN: 10.4 G/DL (ref 11.5–15.5)
HEMOGLOBIN: 10.4 G/DL (ref 11.5–15.5)
HEMOGLOBIN: 10.5 G/DL (ref 11.5–15.5)
HEMOGLOBIN: 10.6 G/DL (ref 11.5–15.5)
HEMOGLOBIN: 10.6 G/DL (ref 11.5–15.5)
HEMOGLOBIN: 10.7 G/DL (ref 11.5–15.5)
HEMOGLOBIN: 10.7 G/DL (ref 11.5–15.5)
HEMOGLOBIN: 10.9 G/DL (ref 11.5–15.5)
HEMOGLOBIN: 11.4 G/DL (ref 11.5–15.5)
HEMOGLOBIN: 11.8 G/DL (ref 11.5–15.5)
HEMOGLOBIN: 7.2 G/DL (ref 11.5–15.5)
HEMOGLOBIN: 8.1 G/DL (ref 11.5–15.5)
HEMOGLOBIN: 8.3 G/DL (ref 11.5–15.5)
HEMOGLOBIN: 8.6 G/DL (ref 11.5–15.5)
HEMOGLOBIN: 9 G/DL (ref 11.5–15.5)
HEMOGLOBIN: 9.1 G/DL (ref 11.5–15.5)
HEMOGLOBIN: 9.2 G/DL (ref 11.5–15.5)
HEMOGLOBIN: 9.5 G/DL (ref 11.5–15.5)
HEMOGLOBIN: 9.6 G/DL (ref 11.5–15.5)
HEMOGLOBIN: 9.8 G/DL (ref 11.5–15.5)
HEMOGLOBIN: 9.8 G/DL (ref 11.5–15.5)
HEMOGLOBIN: 9.9 G/DL (ref 11.5–15.5)
IMMATURE GRANULOCYTES #: 0 E9/L
IMMATURE GRANULOCYTES #: 0.01 E9/L
IMMATURE GRANULOCYTES #: 0.02 E9/L
IMMATURE GRANULOCYTES #: 0.03 E9/L
IMMATURE GRANULOCYTES %: 0 % (ref 0–5)
IMMATURE GRANULOCYTES %: 0.2 % (ref 0–5)
IMMATURE GRANULOCYTES %: 0.3 % (ref 0–5)
IMMATURE GRANULOCYTES %: 0.3 % (ref 0–5)
IMMATURE GRANULOCYTES %: 0.4 % (ref 0–5)
IMMATURE GRANULOCYTES %: 0.5 % (ref 0–5)
IMMATURE GRANULOCYTES %: 0.5 % (ref 0–5)
INR BLD: 1.5
INR BLD: 1.7
INR BLD: 1.9
IRON SATURATION: 21 % (ref 15–50)
IRON SATURATION: 29 % (ref 15–50)
IRON SATURATION: 37 % (ref 15–50)
IRON SATURATION: 49 % (ref 15–50)
IRON: 50 MCG/DL (ref 37–145)
IRON: 61 MCG/DL (ref 37–145)
IRON: 80 MCG/DL (ref 37–145)
IRON: 81 MCG/DL (ref 37–145)
KETONES, URINE: NEGATIVE MG/DL
LACTIC ACID: 0.5 MMOL/L (ref 0.5–2.2)
LACTIC ACID: 0.5 MMOL/L (ref 0.5–2.2)
LACTIC ACID: 0.8 MMOL/L (ref 0.5–2.2)
LACTIC ACID: 0.8 MMOL/L (ref 0.5–2.2)
LACTIC ACID: 0.9 MMOL/L (ref 0.5–2.2)
LACTIC ACID: 1 MMOL/L (ref 0.5–2.2)
LACTIC ACID: 1.1 MMOL/L (ref 0.5–2.2)
LACTIC ACID: 1.2 MMOL/L (ref 0.5–2.2)
LACTIC ACID: 1.3 MMOL/L (ref 0.5–2.2)
LACTIC ACID: 1.3 MMOL/L (ref 0.5–2.2)
LACTIC ACID: 1.4 MMOL/L (ref 0.5–2.2)
LACTIC ACID: 1.5 MMOL/L (ref 0.5–2.2)
LACTIC ACID: 1.6 MMOL/L (ref 0.5–2.2)
LACTIC ACID: 1.7 MMOL/L (ref 0.5–2.2)
LACTIC ACID: 1.7 MMOL/L (ref 0.5–2.2)
LACTIC ACID: 1.8 MMOL/L (ref 0.5–2.2)
LACTIC ACID: 10.2 MMOL/L (ref 0.5–2.2)
LACTIC ACID: 2.2 MMOL/L (ref 0.5–2.2)
LACTIC ACID: 3.8 MMOL/L (ref 0.5–2.2)
LACTIC ACID: 7 MMOL/L (ref 0.5–2.2)
LACTIC ACID: 7.9 MMOL/L (ref 0.5–2.2)
LEUKOCYTE ESTERASE, URINE: ABNORMAL
LEUKOCYTE ESTERASE, URINE: ABNORMAL
LEUKOCYTE ESTERASE, URINE: NEGATIVE
LV EF: 15 %
LV EF: 30 %
LV EF: 35 %
LVEF MODALITY: NORMAL
LYMPHOCYTES ABSOLUTE: 0.73 E9/L (ref 1.5–4)
LYMPHOCYTES ABSOLUTE: 0.96 E9/L (ref 1.5–4)
LYMPHOCYTES ABSOLUTE: 1.02 E9/L (ref 1.5–4)
LYMPHOCYTES ABSOLUTE: 1.06 E9/L (ref 1.5–4)
LYMPHOCYTES ABSOLUTE: 1.1 E9/L (ref 1.5–4)
LYMPHOCYTES ABSOLUTE: 1.16 E9/L (ref 1.5–4)
LYMPHOCYTES ABSOLUTE: 1.22 E9/L (ref 1.5–4)
LYMPHOCYTES ABSOLUTE: 1.29 E9/L (ref 1.5–4)
LYMPHOCYTES ABSOLUTE: 1.32 E9/L (ref 1.5–4)
LYMPHOCYTES ABSOLUTE: 1.43 E9/L (ref 1.5–4)
LYMPHOCYTES ABSOLUTE: 1.43 E9/L (ref 1.5–4)
LYMPHOCYTES ABSOLUTE: 1.5 E9/L (ref 1.5–4)
LYMPHOCYTES ABSOLUTE: 1.51 E9/L (ref 1.5–4)
LYMPHOCYTES ABSOLUTE: 1.55 E9/L (ref 1.5–4)
LYMPHOCYTES ABSOLUTE: 1.64 E9/L (ref 1.5–4)
LYMPHOCYTES ABSOLUTE: 1.73 E9/L (ref 1.5–4)
LYMPHOCYTES ABSOLUTE: 1.87 E9/L (ref 1.5–4)
LYMPHOCYTES ABSOLUTE: 1.88 E9/L (ref 1.5–4)
LYMPHOCYTES ABSOLUTE: 1.9 E9/L (ref 1.5–4)
LYMPHOCYTES ABSOLUTE: 1.9 E9/L (ref 1.5–4)
LYMPHOCYTES ABSOLUTE: 2.13 E9/L (ref 1.5–4)
LYMPHOCYTES RELATIVE PERCENT: 11.8 % (ref 20–42)
LYMPHOCYTES RELATIVE PERCENT: 15.4 % (ref 20–42)
LYMPHOCYTES RELATIVE PERCENT: 22 % (ref 20–42)
LYMPHOCYTES RELATIVE PERCENT: 22.1 % (ref 20–42)
LYMPHOCYTES RELATIVE PERCENT: 22.6 % (ref 20–42)
LYMPHOCYTES RELATIVE PERCENT: 23.6 % (ref 20–42)
LYMPHOCYTES RELATIVE PERCENT: 30.1 % (ref 20–42)
LYMPHOCYTES RELATIVE PERCENT: 30.1 % (ref 20–42)
LYMPHOCYTES RELATIVE PERCENT: 30.9 % (ref 20–42)
LYMPHOCYTES RELATIVE PERCENT: 31.4 % (ref 20–42)
LYMPHOCYTES RELATIVE PERCENT: 32.3 % (ref 20–42)
LYMPHOCYTES RELATIVE PERCENT: 33.6 % (ref 20–42)
LYMPHOCYTES RELATIVE PERCENT: 34.2 % (ref 20–42)
LYMPHOCYTES RELATIVE PERCENT: 34.4 % (ref 20–42)
LYMPHOCYTES RELATIVE PERCENT: 34.4 % (ref 20–42)
LYMPHOCYTES RELATIVE PERCENT: 34.5 % (ref 20–42)
LYMPHOCYTES RELATIVE PERCENT: 36.4 % (ref 20–42)
LYMPHOCYTES RELATIVE PERCENT: 38.9 % (ref 20–42)
LYMPHOCYTES RELATIVE PERCENT: 41.7 % (ref 20–42)
LYMPHOCYTES RELATIVE PERCENT: 41.8 % (ref 20–42)
LYMPHOCYTES RELATIVE PERCENT: 42.2 % (ref 20–42)
MAGNESIUM: 1.8 MG/DL (ref 1.6–2.6)
MAGNESIUM: 1.9 MG/DL (ref 1.6–2.6)
MAGNESIUM: 2 MG/DL (ref 1.6–2.6)
MAGNESIUM: 2 MG/DL (ref 1.6–2.6)
MAGNESIUM: 2.1 MG/DL (ref 1.6–2.6)
MAGNESIUM: 2.2 MG/DL (ref 1.6–2.6)
MAGNESIUM: 2.3 MG/DL (ref 1.6–2.6)
MAGNESIUM: 2.4 MG/DL (ref 1.6–2.6)
MAGNESIUM: 2.4 MG/DL (ref 1.6–2.6)
MAGNESIUM: 2.6 MG/DL (ref 1.6–2.6)
MAGNESIUM: 2.7 MG/DL (ref 1.6–2.6)
MAGNESIUM: 2.8 MG/DL (ref 1.6–2.6)
MAGNESIUM: 2.8 MG/DL (ref 1.6–2.6)
MCH RBC QN AUTO: 30.1 PG (ref 26–35)
MCH RBC QN AUTO: 30.5 PG (ref 26–35)
MCH RBC QN AUTO: 30.7 PG (ref 26–35)
MCH RBC QN AUTO: 30.8 PG (ref 26–35)
MCH RBC QN AUTO: 30.8 PG (ref 26–35)
MCH RBC QN AUTO: 30.9 PG (ref 26–35)
MCH RBC QN AUTO: 31 PG (ref 26–35)
MCH RBC QN AUTO: 31.2 PG (ref 26–35)
MCH RBC QN AUTO: 31.4 PG (ref 26–35)
MCH RBC QN AUTO: 31.5 PG (ref 26–35)
MCH RBC QN AUTO: 31.5 PG (ref 26–35)
MCH RBC QN AUTO: 31.6 PG (ref 26–35)
MCH RBC QN AUTO: 31.7 PG (ref 26–35)
MCH RBC QN AUTO: 31.8 PG (ref 26–35)
MCH RBC QN AUTO: 31.9 PG (ref 26–35)
MCH RBC QN AUTO: 31.9 PG (ref 26–35)
MCH RBC QN AUTO: 32 PG (ref 26–35)
MCH RBC QN AUTO: 32.3 PG (ref 26–35)
MCH RBC QN AUTO: 32.5 PG (ref 26–35)
MCHC RBC AUTO-ENTMCNC: 30.6 % (ref 32–34.5)
MCHC RBC AUTO-ENTMCNC: 30.8 % (ref 32–34.5)
MCHC RBC AUTO-ENTMCNC: 30.9 % (ref 32–34.5)
MCHC RBC AUTO-ENTMCNC: 31 % (ref 32–34.5)
MCHC RBC AUTO-ENTMCNC: 31 % (ref 32–34.5)
MCHC RBC AUTO-ENTMCNC: 31.1 % (ref 32–34.5)
MCHC RBC AUTO-ENTMCNC: 31.1 % (ref 32–34.5)
MCHC RBC AUTO-ENTMCNC: 31.3 % (ref 32–34.5)
MCHC RBC AUTO-ENTMCNC: 31.3 % (ref 32–34.5)
MCHC RBC AUTO-ENTMCNC: 31.4 % (ref 32–34.5)
MCHC RBC AUTO-ENTMCNC: 31.4 % (ref 32–34.5)
MCHC RBC AUTO-ENTMCNC: 31.5 % (ref 32–34.5)
MCHC RBC AUTO-ENTMCNC: 31.5 % (ref 32–34.5)
MCHC RBC AUTO-ENTMCNC: 31.6 % (ref 32–34.5)
MCHC RBC AUTO-ENTMCNC: 31.7 % (ref 32–34.5)
MCHC RBC AUTO-ENTMCNC: 31.8 % (ref 32–34.5)
MCHC RBC AUTO-ENTMCNC: 31.9 % (ref 32–34.5)
MCHC RBC AUTO-ENTMCNC: 31.9 % (ref 32–34.5)
MCHC RBC AUTO-ENTMCNC: 32 % (ref 32–34.5)
MCHC RBC AUTO-ENTMCNC: 32 % (ref 32–34.5)
MCHC RBC AUTO-ENTMCNC: 32.1 % (ref 32–34.5)
MCHC RBC AUTO-ENTMCNC: 32.2 % (ref 32–34.5)
MCHC RBC AUTO-ENTMCNC: 32.2 % (ref 32–34.5)
MCHC RBC AUTO-ENTMCNC: 32.3 % (ref 32–34.5)
MCHC RBC AUTO-ENTMCNC: 33.1 % (ref 32–34.5)
MCHC RBC AUTO-ENTMCNC: 34.2 % (ref 32–34.5)
MCV RBC AUTO: 100 FL (ref 80–99.9)
MCV RBC AUTO: 100.3 FL (ref 80–99.9)
MCV RBC AUTO: 100.6 FL (ref 80–99.9)
MCV RBC AUTO: 101.2 FL (ref 80–99.9)
MCV RBC AUTO: 101.8 FL (ref 80–99.9)
MCV RBC AUTO: 102 FL (ref 80–99.9)
MCV RBC AUTO: 102.3 FL (ref 80–99.9)
MCV RBC AUTO: 102.6 FL (ref 80–99.9)
MCV RBC AUTO: 94.3 FL (ref 80–99.9)
MCV RBC AUTO: 94.8 FL (ref 80–99.9)
MCV RBC AUTO: 96.3 FL (ref 80–99.9)
MCV RBC AUTO: 96.4 FL (ref 80–99.9)
MCV RBC AUTO: 96.9 FL (ref 80–99.9)
MCV RBC AUTO: 97 FL (ref 80–99.9)
MCV RBC AUTO: 97.1 FL (ref 80–99.9)
MCV RBC AUTO: 97.8 FL (ref 80–99.9)
MCV RBC AUTO: 97.9 FL (ref 80–99.9)
MCV RBC AUTO: 98.1 FL (ref 80–99.9)
MCV RBC AUTO: 98.2 FL (ref 80–99.9)
MCV RBC AUTO: 98.2 FL (ref 80–99.9)
MCV RBC AUTO: 98.4 FL (ref 80–99.9)
MCV RBC AUTO: 99.1 FL (ref 80–99.9)
MCV RBC AUTO: 99.2 FL (ref 80–99.9)
MCV RBC AUTO: 99.4 FL (ref 80–99.9)
MCV RBC AUTO: 99.6 FL (ref 80–99.9)
MCV RBC AUTO: 99.7 FL (ref 80–99.9)
METER GLUCOSE: 106 MG/DL (ref 70–110)
METER GLUCOSE: 109 MG/DL (ref 70–110)
METER GLUCOSE: 109 MG/DL (ref 70–110)
METER GLUCOSE: 114 MG/DL (ref 70–110)
METER GLUCOSE: 117 MG/DL (ref 70–110)
METER GLUCOSE: 121 MG/DL (ref 70–110)
METER GLUCOSE: 123 MG/DL (ref 70–110)
METER GLUCOSE: 123 MG/DL (ref 70–110)
METER GLUCOSE: 125 MG/DL (ref 70–110)
METER GLUCOSE: 127 MG/DL (ref 70–110)
METER GLUCOSE: 132 MG/DL (ref 70–110)
METER GLUCOSE: 136 MG/DL (ref 70–110)
METER GLUCOSE: 140 MG/DL (ref 70–110)
METER GLUCOSE: 143 MG/DL (ref 70–110)
METER GLUCOSE: 84 MG/DL (ref 70–110)
METER GLUCOSE: 88 MG/DL (ref 70–110)
METER GLUCOSE: 90 MG/DL (ref 70–110)
METER GLUCOSE: 98 MG/DL (ref 70–110)
METER GLUCOSE: 98 MG/DL (ref 70–110)
MONOCYTES ABSOLUTE: 0.18 E9/L (ref 0.1–0.95)
MONOCYTES ABSOLUTE: 0.34 E9/L (ref 0.1–0.95)
MONOCYTES ABSOLUTE: 0.34 E9/L (ref 0.1–0.95)
MONOCYTES ABSOLUTE: 0.36 E9/L (ref 0.1–0.95)
MONOCYTES ABSOLUTE: 0.36 E9/L (ref 0.1–0.95)
MONOCYTES ABSOLUTE: 0.38 E9/L (ref 0.1–0.95)
MONOCYTES ABSOLUTE: 0.4 E9/L (ref 0.1–0.95)
MONOCYTES ABSOLUTE: 0.42 E9/L (ref 0.1–0.95)
MONOCYTES ABSOLUTE: 0.42 E9/L (ref 0.1–0.95)
MONOCYTES ABSOLUTE: 0.45 E9/L (ref 0.1–0.95)
MONOCYTES ABSOLUTE: 0.47 E9/L (ref 0.1–0.95)
MONOCYTES ABSOLUTE: 0.47 E9/L (ref 0.1–0.95)
MONOCYTES ABSOLUTE: 0.49 E9/L (ref 0.1–0.95)
MONOCYTES ABSOLUTE: 0.49 E9/L (ref 0.1–0.95)
MONOCYTES ABSOLUTE: 0.5 E9/L (ref 0.1–0.95)
MONOCYTES ABSOLUTE: 0.5 E9/L (ref 0.1–0.95)
MONOCYTES ABSOLUTE: 0.51 E9/L (ref 0.1–0.95)
MONOCYTES ABSOLUTE: 0.51 E9/L (ref 0.1–0.95)
MONOCYTES ABSOLUTE: 0.57 E9/L (ref 0.1–0.95)
MONOCYTES ABSOLUTE: 0.61 E9/L (ref 0.1–0.95)
MONOCYTES ABSOLUTE: 0.88 E9/L (ref 0.1–0.95)
MONOCYTES RELATIVE PERCENT: 10 % (ref 2–12)
MONOCYTES RELATIVE PERCENT: 10.3 % (ref 2–12)
MONOCYTES RELATIVE PERCENT: 10.3 % (ref 2–12)
MONOCYTES RELATIVE PERCENT: 10.6 % (ref 2–12)
MONOCYTES RELATIVE PERCENT: 11.1 % (ref 2–12)
MONOCYTES RELATIVE PERCENT: 11.3 % (ref 2–12)
MONOCYTES RELATIVE PERCENT: 14.2 % (ref 2–12)
MONOCYTES RELATIVE PERCENT: 4 % (ref 2–12)
MONOCYTES RELATIVE PERCENT: 6.8 % (ref 2–12)
MONOCYTES RELATIVE PERCENT: 7.7 % (ref 2–12)
MONOCYTES RELATIVE PERCENT: 8 % (ref 2–12)
MONOCYTES RELATIVE PERCENT: 8.5 % (ref 2–12)
MONOCYTES RELATIVE PERCENT: 8.6 % (ref 2–12)
MONOCYTES RELATIVE PERCENT: 8.7 % (ref 2–12)
MONOCYTES RELATIVE PERCENT: 8.9 % (ref 2–12)
MONOCYTES RELATIVE PERCENT: 9.3 % (ref 2–12)
MONOCYTES RELATIVE PERCENT: 9.8 % (ref 2–12)
MRSA CULTURE ONLY: NORMAL
NEUTROPHILS ABSOLUTE: 1.96 E9/L (ref 1.8–7.3)
NEUTROPHILS ABSOLUTE: 2.07 E9/L (ref 1.8–7.3)
NEUTROPHILS ABSOLUTE: 2.18 E9/L (ref 1.8–7.3)
NEUTROPHILS ABSOLUTE: 2.22 E9/L (ref 1.8–7.3)
NEUTROPHILS ABSOLUTE: 2.27 E9/L (ref 1.8–7.3)
NEUTROPHILS ABSOLUTE: 2.29 E9/L (ref 1.8–7.3)
NEUTROPHILS ABSOLUTE: 2.33 E9/L (ref 1.8–7.3)
NEUTROPHILS ABSOLUTE: 2.37 E9/L (ref 1.8–7.3)
NEUTROPHILS ABSOLUTE: 2.39 E9/L (ref 1.8–7.3)
NEUTROPHILS ABSOLUTE: 2.43 E9/L (ref 1.8–7.3)
NEUTROPHILS ABSOLUTE: 2.92 E9/L (ref 1.8–7.3)
NEUTROPHILS ABSOLUTE: 2.94 E9/L (ref 1.8–7.3)
NEUTROPHILS ABSOLUTE: 2.95 E9/L (ref 1.8–7.3)
NEUTROPHILS ABSOLUTE: 3.06 E9/L (ref 1.8–7.3)
NEUTROPHILS ABSOLUTE: 3.21 E9/L (ref 1.8–7.3)
NEUTROPHILS ABSOLUTE: 3.62 E9/L (ref 1.8–7.3)
NEUTROPHILS ABSOLUTE: 3.66 E9/L (ref 1.8–7.3)
NEUTROPHILS ABSOLUTE: 4.53 E9/L (ref 1.8–7.3)
NEUTROPHILS ABSOLUTE: 4.79 E9/L (ref 1.8–7.3)
NEUTROPHILS RELATIVE PERCENT: 43.4 % (ref 43–80)
NEUTROPHILS RELATIVE PERCENT: 44.9 % (ref 43–80)
NEUTROPHILS RELATIVE PERCENT: 48.4 % (ref 43–80)
NEUTROPHILS RELATIVE PERCENT: 48.5 % (ref 43–80)
NEUTROPHILS RELATIVE PERCENT: 50.4 % (ref 43–80)
NEUTROPHILS RELATIVE PERCENT: 51 % (ref 43–80)
NEUTROPHILS RELATIVE PERCENT: 52.3 % (ref 43–80)
NEUTROPHILS RELATIVE PERCENT: 53.3 % (ref 43–80)
NEUTROPHILS RELATIVE PERCENT: 54.5 % (ref 43–80)
NEUTROPHILS RELATIVE PERCENT: 55.1 % (ref 43–80)
NEUTROPHILS RELATIVE PERCENT: 55.5 % (ref 43–80)
NEUTROPHILS RELATIVE PERCENT: 56.7 % (ref 43–80)
NEUTROPHILS RELATIVE PERCENT: 57.1 % (ref 43–80)
NEUTROPHILS RELATIVE PERCENT: 58.6 % (ref 43–80)
NEUTROPHILS RELATIVE PERCENT: 59.1 % (ref 43–80)
NEUTROPHILS RELATIVE PERCENT: 64.6 % (ref 43–80)
NEUTROPHILS RELATIVE PERCENT: 66.3 % (ref 43–80)
NEUTROPHILS RELATIVE PERCENT: 68.7 % (ref 43–80)
NEUTROPHILS RELATIVE PERCENT: 71.4 % (ref 43–80)
NEUTROPHILS RELATIVE PERCENT: 72.8 % (ref 43–80)
NEUTROPHILS RELATIVE PERCENT: 77 % (ref 43–80)
NITRITE, URINE: NEGATIVE
ORGANISM: ABNORMAL
ORGANISM: ABNORMAL
OSMOLALITY URINE: 308 MOSM/KG (ref 300–900)
OSMOLALITY URINE: 341 MOSM/KG (ref 300–900)
OSMOLALITY URINE: 405 MOSM/KG (ref 300–900)
PARATHYROID HORMONE INTACT: 152 PG/ML (ref 15–65)
PDW BLD-RTO: 13.8 FL (ref 11.5–15)
PDW BLD-RTO: 13.9 FL (ref 11.5–15)
PDW BLD-RTO: 13.9 FL (ref 11.5–15)
PDW BLD-RTO: 14 FL (ref 11.5–15)
PDW BLD-RTO: 14 FL (ref 11.5–15)
PDW BLD-RTO: 14.2 FL (ref 11.5–15)
PDW BLD-RTO: 14.3 FL (ref 11.5–15)
PDW BLD-RTO: 14.4 FL (ref 11.5–15)
PDW BLD-RTO: 14.6 FL (ref 11.5–15)
PDW BLD-RTO: 14.7 FL (ref 11.5–15)
PDW BLD-RTO: 14.7 FL (ref 11.5–15)
PDW BLD-RTO: 14.8 FL (ref 11.5–15)
PDW BLD-RTO: 14.8 FL (ref 11.5–15)
PDW BLD-RTO: 14.9 FL (ref 11.5–15)
PDW BLD-RTO: 15 FL (ref 11.5–15)
PDW BLD-RTO: 15.3 FL (ref 11.5–15)
PDW BLD-RTO: 15.4 FL (ref 11.5–15)
PDW BLD-RTO: 15.5 FL (ref 11.5–15)
PDW BLD-RTO: 15.5 FL (ref 11.5–15)
PDW BLD-RTO: 15.6 FL (ref 11.5–15)
PDW BLD-RTO: 15.7 FL (ref 11.5–15)
PDW BLD-RTO: 15.8 FL (ref 11.5–15)
PDW BLD-RTO: 15.9 FL (ref 11.5–15)
PDW BLD-RTO: 15.9 FL (ref 11.5–15)
PH UA: 5 (ref 5–9)
PH UA: 5 (ref 5–9)
PH UA: 7.5 (ref 5–9)
PHOSPHORUS: 2.7 MG/DL (ref 2.5–4.5)
PHOSPHORUS: 3.1 MG/DL (ref 2.5–4.5)
PHOSPHORUS: 3.5 MG/DL (ref 2.5–4.5)
PHOSPHORUS: 3.5 MG/DL (ref 2.5–4.5)
PHOSPHORUS: 3.7 MG/DL (ref 2.5–4.5)
PHOSPHORUS: 3.8 MG/DL (ref 2.5–4.5)
PHOSPHORUS: 3.9 MG/DL (ref 2.5–4.5)
PHOSPHORUS: 4 MG/DL (ref 2.5–4.5)
PHOSPHORUS: 4.2 MG/DL (ref 2.5–4.5)
PHOSPHORUS: 4.2 MG/DL (ref 2.5–4.5)
PHOSPHORUS: 4.4 MG/DL (ref 2.5–4.5)
PHOSPHORUS: 4.5 MG/DL (ref 2.5–4.5)
PHOSPHORUS: 4.6 MG/DL (ref 2.5–4.5)
PHOSPHORUS: 5 MG/DL (ref 2.5–4.5)
PHOSPHORUS: 6.4 MG/DL (ref 2.5–4.5)
PLATELET # BLD: 120 E9/L (ref 130–450)
PLATELET # BLD: 126 E9/L (ref 130–450)
PLATELET # BLD: 131 E9/L (ref 130–450)
PLATELET # BLD: 131 E9/L (ref 130–450)
PLATELET # BLD: 132 E9/L (ref 130–450)
PLATELET # BLD: 136 E9/L (ref 130–450)
PLATELET # BLD: 137 E9/L (ref 130–450)
PLATELET # BLD: 138 E9/L (ref 130–450)
PLATELET # BLD: 139 E9/L (ref 130–450)
PLATELET # BLD: 144 E9/L (ref 130–450)
PLATELET # BLD: 145 E9/L (ref 130–450)
PLATELET # BLD: 146 E9/L (ref 130–450)
PLATELET # BLD: 148 E9/L (ref 130–450)
PLATELET # BLD: 149 E9/L (ref 130–450)
PLATELET # BLD: 150 E9/L (ref 130–450)
PLATELET # BLD: 150 E9/L (ref 130–450)
PLATELET # BLD: 152 E9/L (ref 130–450)
PLATELET # BLD: 153 E9/L (ref 130–450)
PLATELET # BLD: 155 E9/L (ref 130–450)
PLATELET # BLD: 155 E9/L (ref 130–450)
PLATELET # BLD: 167 E9/L (ref 130–450)
PLATELET # BLD: 168 E9/L (ref 130–450)
PLATELET # BLD: 171 E9/L (ref 130–450)
PLATELET # BLD: 200 E9/L (ref 130–450)
PLATELET # BLD: 207 E9/L (ref 130–450)
PLATELET # BLD: 209 E9/L (ref 130–450)
PLATELET # BLD: 211 E9/L (ref 130–450)
PLATELET # BLD: 215 E9/L (ref 130–450)
PLATELET # BLD: 233 E9/L (ref 130–450)
PLATELET # BLD: 244 E9/L (ref 130–450)
PMV BLD AUTO: 10.2 FL (ref 7–12)
PMV BLD AUTO: 10.2 FL (ref 7–12)
PMV BLD AUTO: 10.4 FL (ref 7–12)
PMV BLD AUTO: 10.5 FL (ref 7–12)
PMV BLD AUTO: 10.5 FL (ref 7–12)
PMV BLD AUTO: 10.8 FL (ref 7–12)
PMV BLD AUTO: 10.9 FL (ref 7–12)
PMV BLD AUTO: 11.2 FL (ref 7–12)
PMV BLD AUTO: 11.3 FL (ref 7–12)
PMV BLD AUTO: 11.4 FL (ref 7–12)
PMV BLD AUTO: 11.4 FL (ref 7–12)
PMV BLD AUTO: 11.7 FL (ref 7–12)
PMV BLD AUTO: 11.7 FL (ref 7–12)
PMV BLD AUTO: 11.8 FL (ref 7–12)
PMV BLD AUTO: 11.8 FL (ref 7–12)
PMV BLD AUTO: 11.9 FL (ref 7–12)
PMV BLD AUTO: 11.9 FL (ref 7–12)
PMV BLD AUTO: 12 FL (ref 7–12)
PMV BLD AUTO: 12.2 FL (ref 7–12)
PMV BLD AUTO: 12.3 FL (ref 7–12)
PMV BLD AUTO: 12.5 FL (ref 7–12)
PMV BLD AUTO: 12.5 FL (ref 7–12)
PMV BLD AUTO: 12.6 FL (ref 7–12)
PMV BLD AUTO: 9.9 FL (ref 7–12)
POC CHLORIDE: 98 MMOL/L (ref 100–108)
POC CREATININE: 3.1 MG/DL (ref 0.5–1)
POC POTASSIUM: 5.2 MMOL/L (ref 3.5–5)
POC SODIUM: 138 MMOL/L (ref 132–146)
POTASSIUM REFLEX MAGNESIUM: 3.7 MMOL/L (ref 3.5–5)
POTASSIUM REFLEX MAGNESIUM: 4 MMOL/L (ref 3.5–5)
POTASSIUM REFLEX MAGNESIUM: 4.1 MMOL/L (ref 3.5–5)
POTASSIUM REFLEX MAGNESIUM: 4.1 MMOL/L (ref 3.5–5)
POTASSIUM REFLEX MAGNESIUM: 4.3 MMOL/L (ref 3.5–5)
POTASSIUM REFLEX MAGNESIUM: 4.3 MMOL/L (ref 3.5–5)
POTASSIUM REFLEX MAGNESIUM: 4.6 MMOL/L (ref 3.5–5)
POTASSIUM REFLEX MAGNESIUM: 4.9 MMOL/L (ref 3.5–5)
POTASSIUM REFLEX MAGNESIUM: 5 MMOL/L (ref 3.5–5)
POTASSIUM REFLEX MAGNESIUM: 5.3 MMOL/L (ref 3.5–5)
POTASSIUM SERPL-SCNC: 3.4 MMOL/L (ref 3.5–5)
POTASSIUM SERPL-SCNC: 3.5 MMOL/L (ref 3.5–5)
POTASSIUM SERPL-SCNC: 3.7 MMOL/L (ref 3.5–5)
POTASSIUM SERPL-SCNC: 3.7 MMOL/L (ref 3.5–5)
POTASSIUM SERPL-SCNC: 3.9 MMOL/L (ref 3.5–5)
POTASSIUM SERPL-SCNC: 4 MMOL/L (ref 3.5–5)
POTASSIUM SERPL-SCNC: 4.1 MMOL/L (ref 3.5–5)
POTASSIUM SERPL-SCNC: 4.2 MMOL/L (ref 3.5–5)
POTASSIUM SERPL-SCNC: 4.3 MMOL/L (ref 3.5–5)
POTASSIUM SERPL-SCNC: 4.4 MMOL/L (ref 3.5–5)
POTASSIUM SERPL-SCNC: 4.5 MMOL/L (ref 3.5–5)
POTASSIUM SERPL-SCNC: 4.6 MMOL/L (ref 3.5–5)
POTASSIUM SERPL-SCNC: 4.7 MMOL/L (ref 3.5–5)
POTASSIUM SERPL-SCNC: 4.7 MMOL/L (ref 3.5–5)
POTASSIUM SERPL-SCNC: 4.8 MMOL/L (ref 3.5–5)
POTASSIUM SERPL-SCNC: 5 MMOL/L (ref 3.5–5)
POTASSIUM SERPL-SCNC: 5.1 MMOL/L (ref 3.5–5)
POTASSIUM SERPL-SCNC: 5.3 MMOL/L (ref 3.5–5)
POTASSIUM SERPL-SCNC: 5.3 MMOL/L (ref 3.5–5)
POTASSIUM SERPL-SCNC: 5.4 MMOL/L (ref 3.5–5)
POTASSIUM SERPL-SCNC: 5.8 MMOL/L (ref 3.5–5)
POTASSIUM SERPL-SCNC: 6.1 MMOL/L (ref 3.5–5)
POTASSIUM SERPL-SCNC: 6.6 MMOL/L (ref 3.5–5)
POTASSIUM SERPL-SCNC: 6.9 MMOL/L (ref 3.5–5)
POTASSIUM, UR: 44.1 MMOL/L
PRO-BNP: ABNORMAL PG/ML (ref 0–450)
PROCALCITONIN: 0.31 NG/ML (ref 0–0.08)
PROCALCITONIN: 0.76 NG/ML (ref 0–0.08)
PROTEIN PROTEIN: 24 MG/DL (ref 0–12)
PROTEIN PROTEIN: 27 MG/DL (ref 0–12)
PROTEIN PROTEIN: 53 MG/DL (ref 0–12)
PROTEIN UA: 100 MG/DL
PROTEIN UA: 30 MG/DL
PROTEIN UA: NEGATIVE MG/DL
PROTEIN/CREAT RATIO: 0.2
PROTEIN/CREAT RATIO: 0.2 (ref 0–0.2)
PROTEIN/CREAT RATIO: 0.5
PROTEIN/CREAT RATIO: 0.5 (ref 0–0.2)
PROTEIN/CREAT RATIO: 1.3
PROTEIN/CREAT RATIO: 1.3 (ref 0–0.2)
PROTHROMBIN TIME: 16.8 SEC (ref 9.3–12.4)
PROTHROMBIN TIME: 18.8 SEC (ref 9.3–12.4)
PROTHROMBIN TIME: 20.6 SEC (ref 9.3–12.4)
RBC # BLD: 2.39 E12/L (ref 3.5–5.5)
RBC # BLD: 2.66 E12/L (ref 3.5–5.5)
RBC # BLD: 2.66 E12/L (ref 3.5–5.5)
RBC # BLD: 2.77 E12/L (ref 3.5–5.5)
RBC # BLD: 2.79 E12/L (ref 3.5–5.5)
RBC # BLD: 2.8 E12/L (ref 3.5–5.5)
RBC # BLD: 2.82 E12/L (ref 3.5–5.5)
RBC # BLD: 2.92 E12/L (ref 3.5–5.5)
RBC # BLD: 2.98 E12/L (ref 3.5–5.5)
RBC # BLD: 2.98 E12/L (ref 3.5–5.5)
RBC # BLD: 3.06 E12/L (ref 3.5–5.5)
RBC # BLD: 3.08 E12/L (ref 3.5–5.5)
RBC # BLD: 3.11 E12/L (ref 3.5–5.5)
RBC # BLD: 3.16 E12/L (ref 3.5–5.5)
RBC # BLD: 3.16 E12/L (ref 3.5–5.5)
RBC # BLD: 3.17 E12/L (ref 3.5–5.5)
RBC # BLD: 3.18 E12/L (ref 3.5–5.5)
RBC # BLD: 3.21 E12/L (ref 3.5–5.5)
RBC # BLD: 3.22 E12/L (ref 3.5–5.5)
RBC # BLD: 3.26 E12/L (ref 3.5–5.5)
RBC # BLD: 3.27 E12/L (ref 3.5–5.5)
RBC # BLD: 3.28 E12/L (ref 3.5–5.5)
RBC # BLD: 3.3 E12/L (ref 3.5–5.5)
RBC # BLD: 3.34 E12/L (ref 3.5–5.5)
RBC # BLD: 3.37 E12/L (ref 3.5–5.5)
RBC # BLD: 3.42 E12/L (ref 3.5–5.5)
RBC # BLD: 3.43 E12/L (ref 3.5–5.5)
RBC # BLD: 3.46 E12/L (ref 3.5–5.5)
RBC # BLD: 3.58 E12/L (ref 3.5–5.5)
RBC # BLD: 3.69 E12/L (ref 3.5–5.5)
RBC UA: >20 /HPF (ref 0–2)
RBC UA: >20 /HPF (ref 0–2)
RBC UA: ABNORMAL /HPF (ref 0–2)
SODIUM BLD-SCNC: 131 MMOL/L (ref 132–146)
SODIUM BLD-SCNC: 132 MMOL/L (ref 132–146)
SODIUM BLD-SCNC: 133 MMOL/L (ref 132–146)
SODIUM BLD-SCNC: 134 MMOL/L (ref 132–146)
SODIUM BLD-SCNC: 135 MMOL/L (ref 132–146)
SODIUM BLD-SCNC: 136 MMOL/L (ref 132–146)
SODIUM BLD-SCNC: 137 MMOL/L (ref 132–146)
SODIUM BLD-SCNC: 138 MMOL/L (ref 132–146)
SODIUM BLD-SCNC: 139 MMOL/L (ref 132–146)
SODIUM BLD-SCNC: 140 MMOL/L (ref 132–146)
SODIUM BLD-SCNC: 141 MMOL/L (ref 132–146)
SODIUM BLD-SCNC: 141 MMOL/L (ref 132–146)
SODIUM URINE: 45 MMOL/L
SODIUM URINE: 45 MMOL/L
SODIUM URINE: <20 MMOL/L
SODIUM URINE: <20 MMOL/L
SPECIFIC GRAVITY UA: 1.01 (ref 1–1.03)
SPECIFIC GRAVITY UA: 1.01 (ref 1–1.03)
SPECIFIC GRAVITY UA: 1.02 (ref 1–1.03)
TOTAL CK: 41 U/L (ref 20–180)
TOTAL CK: 59 U/L (ref 20–180)
TOTAL CK: 72 U/L (ref 20–180)
TOTAL IRON BINDING CAPACITY: 165 MCG/DL (ref 250–450)
TOTAL IRON BINDING CAPACITY: 214 MCG/DL (ref 250–450)
TOTAL IRON BINDING CAPACITY: 217 MCG/DL (ref 250–450)
TOTAL IRON BINDING CAPACITY: 234 MCG/DL (ref 250–450)
TOTAL PROTEIN: 6.1 G/DL (ref 6.4–8.3)
TOTAL PROTEIN: 6.1 G/DL (ref 6.4–8.3)
TOTAL PROTEIN: 6.2 G/DL (ref 6.4–8.3)
TOTAL PROTEIN: 6.3 G/DL (ref 6.4–8.3)
TOTAL PROTEIN: 6.4 G/DL (ref 6.4–8.3)
TOTAL PROTEIN: 6.5 G/DL (ref 6.4–8.3)
TOTAL PROTEIN: 6.6 G/DL (ref 6.4–8.3)
TOTAL PROTEIN: 6.6 G/DL (ref 6.4–8.3)
TOTAL PROTEIN: 6.7 G/DL (ref 6.4–8.3)
TOTAL PROTEIN: 6.8 G/DL (ref 6.4–8.3)
TOTAL PROTEIN: 7 G/DL (ref 6.4–8.3)
TOTAL PROTEIN: 7.1 G/DL (ref 6.4–8.3)
TOTAL PROTEIN: 7.1 G/DL (ref 6.4–8.3)
TOTAL PROTEIN: 7.2 G/DL (ref 6.4–8.3)
TOTAL PROTEIN: 7.4 G/DL (ref 6.4–8.3)
TOTAL PROTEIN: 7.7 G/DL (ref 6.4–8.3)
TOTAL PROTEIN: 7.7 G/DL (ref 6.4–8.3)
TOTAL PROTEIN: 7.9 G/DL (ref 6.4–8.3)
TOTAL PROTEIN: 8 G/DL (ref 6.4–8.3)
TROPONIN: 0.09 NG/ML (ref 0–0.03)
TROPONIN: 0.09 NG/ML (ref 0–0.03)
TROPONIN: 0.1 NG/ML (ref 0–0.03)
TROPONIN: 0.11 NG/ML (ref 0–0.03)
TROPONIN: 0.12 NG/ML (ref 0–0.03)
TROPONIN: 0.12 NG/ML (ref 0–0.03)
TROPONIN: 0.13 NG/ML (ref 0–0.03)
TROPONIN: 0.21 NG/ML (ref 0–0.03)
TROPONIN: 0.23 NG/ML (ref 0–0.03)
TROPONIN: 0.24 NG/ML (ref 0–0.03)
TSH SERPL DL<=0.05 MIU/L-ACNC: 3.44 UIU/ML (ref 0.27–4.2)
TSH SERPL DL<=0.05 MIU/L-ACNC: 8.82 UIU/ML (ref 0.27–4.2)
URIC ACID, SERUM: 11.1 MG/DL (ref 2.4–5.7)
URINE CULTURE, ROUTINE: ABNORMAL
UROBILINOGEN, URINE: 0.2 E.U./DL
UROBILINOGEN, URINE: 0.2 E.U./DL
UROBILINOGEN, URINE: 1 E.U./DL
VITAMIN B-12: 1005 PG/ML (ref 211–946)
VITAMIN D 25-HYDROXY: 53 NG/ML (ref 30–100)
WBC # BLD: 3.7 E9/L (ref 4.5–11.5)
WBC # BLD: 4 E9/L (ref 4.5–11.5)
WBC # BLD: 4.1 E9/L (ref 4.5–11.5)
WBC # BLD: 4.2 E9/L (ref 4.5–11.5)
WBC # BLD: 4.3 E9/L (ref 4.5–11.5)
WBC # BLD: 4.3 E9/L (ref 4.5–11.5)
WBC # BLD: 4.4 E9/L (ref 4.5–11.5)
WBC # BLD: 4.5 E9/L (ref 4.5–11.5)
WBC # BLD: 4.8 E9/L (ref 4.5–11.5)
WBC # BLD: 4.8 E9/L (ref 4.5–11.5)
WBC # BLD: 5 E9/L (ref 4.5–11.5)
WBC # BLD: 5.1 E9/L (ref 4.5–11.5)
WBC # BLD: 5.2 E9/L (ref 4.5–11.5)
WBC # BLD: 5.3 E9/L (ref 4.5–11.5)
WBC # BLD: 5.3 E9/L (ref 4.5–11.5)
WBC # BLD: 5.4 E9/L (ref 4.5–11.5)
WBC # BLD: 5.5 E9/L (ref 4.5–11.5)
WBC # BLD: 5.9 E9/L (ref 4.5–11.5)
WBC # BLD: 6.2 E9/L (ref 4.5–11.5)
WBC # BLD: 6.2 E9/L (ref 4.5–11.5)
WBC # BLD: 6.3 E9/L (ref 4.5–11.5)
WBC # BLD: 7.4 E9/L (ref 4.5–11.5)
WBC # BLD: 7.4 E9/L (ref 4.5–11.5)
WBC # BLD: 7.5 E9/L (ref 4.5–11.5)
WBC UA: ABNORMAL /HPF (ref 0–5)

## 2018-01-01 PROCEDURE — 85610 PROTHROMBIN TIME: CPT

## 2018-01-01 PROCEDURE — 83880 ASSAY OF NATRIURETIC PEPTIDE: CPT

## 2018-01-01 PROCEDURE — 2060000000 HC ICU INTERMEDIATE R&B

## 2018-01-01 PROCEDURE — 6370000000 HC RX 637 (ALT 250 FOR IP): Performed by: FAMILY MEDICINE

## 2018-01-01 PROCEDURE — 2700000000 HC OXYGEN THERAPY PER DAY

## 2018-01-01 PROCEDURE — 83735 ASSAY OF MAGNESIUM: CPT

## 2018-01-01 PROCEDURE — 82436 ASSAY OF URINE CHLORIDE: CPT

## 2018-01-01 PROCEDURE — 85025 COMPLETE CBC W/AUTO DIFF WBC: CPT

## 2018-01-01 PROCEDURE — 1111F DSCHRG MED/CURRENT MED MERGE: CPT | Performed by: INTERNAL MEDICINE

## 2018-01-01 PROCEDURE — 2500000003 HC RX 250 WO HCPCS: Performed by: NURSE PRACTITIONER

## 2018-01-01 PROCEDURE — A0425 GROUND MILEAGE: HCPCS

## 2018-01-01 PROCEDURE — 2580000003 HC RX 258: Performed by: INTERNAL MEDICINE

## 2018-01-01 PROCEDURE — 33208 INSRT HEART PM ATRIAL & VENT: CPT | Performed by: INTERNAL MEDICINE

## 2018-01-01 PROCEDURE — 83550 IRON BINDING TEST: CPT

## 2018-01-01 PROCEDURE — 2580000003 HC RX 258: Performed by: STUDENT IN AN ORGANIZED HEALTH CARE EDUCATION/TRAINING PROGRAM

## 2018-01-01 PROCEDURE — 93005 ELECTROCARDIOGRAM TRACING: CPT | Performed by: INTERNAL MEDICINE

## 2018-01-01 PROCEDURE — 80053 COMPREHEN METABOLIC PANEL: CPT

## 2018-01-01 PROCEDURE — 87088 URINE BACTERIA CULTURE: CPT

## 2018-01-01 PROCEDURE — 97530 THERAPEUTIC ACTIVITIES: CPT

## 2018-01-01 PROCEDURE — 6360000002 HC RX W HCPCS: Performed by: NURSE ANESTHETIST, CERTIFIED REGISTERED

## 2018-01-01 PROCEDURE — 2140000000 HC CCU INTERMEDIATE R&B

## 2018-01-01 PROCEDURE — 7100000000 HC PACU RECOVERY - FIRST 15 MIN

## 2018-01-01 PROCEDURE — 99223 1ST HOSP IP/OBS HIGH 75: CPT | Performed by: INTERNAL MEDICINE

## 2018-01-01 PROCEDURE — 36415 COLL VENOUS BLD VENIPUNCTURE: CPT

## 2018-01-01 PROCEDURE — 2580000003 HC RX 258

## 2018-01-01 PROCEDURE — 84100 ASSAY OF PHOSPHORUS: CPT

## 2018-01-01 PROCEDURE — 82962 GLUCOSE BLOOD TEST: CPT

## 2018-01-01 PROCEDURE — 2000000000 HC ICU R&B

## 2018-01-01 PROCEDURE — 99233 SBSQ HOSP IP/OBS HIGH 50: CPT | Performed by: INTERNAL MEDICINE

## 2018-01-01 PROCEDURE — APPSS60 APP SPLIT SHARED TIME 46-60 MINUTES: Performed by: NURSE PRACTITIONER

## 2018-01-01 PROCEDURE — 99214 OFFICE O/P EST MOD 30 MIN: CPT | Performed by: INTERNAL MEDICINE

## 2018-01-01 PROCEDURE — 71045 X-RAY EXAM CHEST 1 VIEW: CPT

## 2018-01-01 PROCEDURE — 93325 DOPPLER ECHO COLOR FLOW MAPG: CPT

## 2018-01-01 PROCEDURE — 93005 ELECTROCARDIOGRAM TRACING: CPT | Performed by: EMERGENCY MEDICINE

## 2018-01-01 PROCEDURE — 6370000000 HC RX 637 (ALT 250 FOR IP): Performed by: INTERNAL MEDICINE

## 2018-01-01 PROCEDURE — 84300 ASSAY OF URINE SODIUM: CPT

## 2018-01-01 PROCEDURE — 51798 US URINE CAPACITY MEASURE: CPT

## 2018-01-01 PROCEDURE — 97165 OT EVAL LOW COMPLEX 30 MIN: CPT

## 2018-01-01 PROCEDURE — 83036 HEMOGLOBIN GLYCOSYLATED A1C: CPT

## 2018-01-01 PROCEDURE — 99232 SBSQ HOSP IP/OBS MODERATE 35: CPT | Performed by: INTERNAL MEDICINE

## 2018-01-01 PROCEDURE — 2580000003 HC RX 258: Performed by: FAMILY MEDICINE

## 2018-01-01 PROCEDURE — 99285 EMERGENCY DEPT VISIT HI MDM: CPT

## 2018-01-01 PROCEDURE — 6360000002 HC RX W HCPCS: Performed by: INTERNAL MEDICINE

## 2018-01-01 PROCEDURE — 2580000003 HC RX 258: Performed by: EMERGENCY MEDICINE

## 2018-01-01 PROCEDURE — 85027 COMPLETE CBC AUTOMATED: CPT

## 2018-01-01 PROCEDURE — 82570 ASSAY OF URINE CREATININE: CPT

## 2018-01-01 PROCEDURE — 81001 URINALYSIS AUTO W/SCOPE: CPT

## 2018-01-01 PROCEDURE — 87040 BLOOD CULTURE FOR BACTERIA: CPT

## 2018-01-01 PROCEDURE — 6370000000 HC RX 637 (ALT 250 FOR IP): Performed by: STUDENT IN AN ORGANIZED HEALTH CARE EDUCATION/TRAINING PROGRAM

## 2018-01-01 PROCEDURE — 2500000003 HC RX 250 WO HCPCS: Performed by: INTERNAL MEDICINE

## 2018-01-01 PROCEDURE — 96367 TX/PROPH/DG ADDL SEQ IV INF: CPT

## 2018-01-01 PROCEDURE — 97535 SELF CARE MNGMENT TRAINING: CPT

## 2018-01-01 PROCEDURE — 1123F ACP DISCUSS/DSCN MKR DOCD: CPT | Performed by: NURSE PRACTITIONER

## 2018-01-01 PROCEDURE — 80048 BASIC METABOLIC PNL TOTAL CA: CPT

## 2018-01-01 PROCEDURE — 83605 ASSAY OF LACTIC ACID: CPT

## 2018-01-01 PROCEDURE — 99222 1ST HOSP IP/OBS MODERATE 55: CPT | Performed by: INTERNAL MEDICINE

## 2018-01-01 PROCEDURE — 87186 SC STD MICRODIL/AGAR DIL: CPT

## 2018-01-01 PROCEDURE — 82947 ASSAY GLUCOSE BLOOD QUANT: CPT

## 2018-01-01 PROCEDURE — 96374 THER/PROPH/DIAG INJ IV PUSH: CPT

## 2018-01-01 PROCEDURE — 2500000003 HC RX 250 WO HCPCS: Performed by: NURSE ANESTHETIST, CERTIFIED REGISTERED

## 2018-01-01 PROCEDURE — 92960 CARDIOVERSION ELECTRIC EXT: CPT

## 2018-01-01 PROCEDURE — G8988 SELF CARE GOAL STATUS: HCPCS

## 2018-01-01 PROCEDURE — 83935 ASSAY OF URINE OSMOLALITY: CPT

## 2018-01-01 PROCEDURE — 6370000000 HC RX 637 (ALT 250 FOR IP): Performed by: NURSE PRACTITIONER

## 2018-01-01 PROCEDURE — 1111F DSCHRG MED/CURRENT MED MERGE: CPT | Performed by: NURSE PRACTITIONER

## 2018-01-01 PROCEDURE — 6360000002 HC RX W HCPCS: Performed by: STUDENT IN AN ORGANIZED HEALTH CARE EDUCATION/TRAINING PROGRAM

## 2018-01-01 PROCEDURE — 6370000000 HC RX 637 (ALT 250 FOR IP): Performed by: CLINICAL NURSE SPECIALIST

## 2018-01-01 PROCEDURE — 02HL3JZ INSERTION OF PACEMAKER LEAD INTO LEFT VENTRICLE, PERCUTANEOUS APPROACH: ICD-10-PCS | Performed by: INTERNAL MEDICINE

## 2018-01-01 PROCEDURE — 93320 DOPPLER ECHO COMPLETE: CPT

## 2018-01-01 PROCEDURE — 99214 OFFICE O/P EST MOD 30 MIN: CPT | Performed by: NURSE PRACTITIONER

## 2018-01-01 PROCEDURE — 6360000002 HC RX W HCPCS

## 2018-01-01 PROCEDURE — 02HK3JZ INSERTION OF PACEMAKER LEAD INTO RIGHT VENTRICLE, PERCUTANEOUS APPROACH: ICD-10-PCS | Performed by: INTERNAL MEDICINE

## 2018-01-01 PROCEDURE — 82553 CREATINE MB FRACTION: CPT

## 2018-01-01 PROCEDURE — 84145 PROCALCITONIN (PCT): CPT

## 2018-01-01 PROCEDURE — 2500000003 HC RX 250 WO HCPCS: Performed by: EMERGENCY MEDICINE

## 2018-01-01 PROCEDURE — 1036F TOBACCO NON-USER: CPT | Performed by: INTERNAL MEDICINE

## 2018-01-01 PROCEDURE — 4040F PNEUMOC VAC/ADMIN/RCVD: CPT | Performed by: INTERNAL MEDICINE

## 2018-01-01 PROCEDURE — 84133 ASSAY OF URINE POTASSIUM: CPT

## 2018-01-01 PROCEDURE — 84484 ASSAY OF TROPONIN QUANT: CPT

## 2018-01-01 PROCEDURE — 1200000000 HC SEMI PRIVATE

## 2018-01-01 PROCEDURE — 2500000003 HC RX 250 WO HCPCS

## 2018-01-01 PROCEDURE — 76770 US EXAM ABDO BACK WALL COMP: CPT

## 2018-01-01 PROCEDURE — 84156 ASSAY OF PROTEIN URINE: CPT

## 2018-01-01 PROCEDURE — 2500000003 HC RX 250 WO HCPCS: Performed by: STUDENT IN AN ORGANIZED HEALTH CARE EDUCATION/TRAINING PROGRAM

## 2018-01-01 PROCEDURE — 1090F PRES/ABSN URINE INCON ASSESS: CPT | Performed by: INTERNAL MEDICINE

## 2018-01-01 PROCEDURE — 02H63JZ INSERTION OF PACEMAKER LEAD INTO RIGHT ATRIUM, PERCUTANEOUS APPROACH: ICD-10-PCS | Performed by: INTERNAL MEDICINE

## 2018-01-01 PROCEDURE — 33225 L VENTRIC PACING LEAD ADD-ON: CPT | Performed by: INTERNAL MEDICINE

## 2018-01-01 PROCEDURE — 93010 ELECTROCARDIOGRAM REPORT: CPT | Performed by: INTERNAL MEDICINE

## 2018-01-01 PROCEDURE — C1769 GUIDE WIRE: HCPCS

## 2018-01-01 PROCEDURE — 97161 PT EVAL LOW COMPLEX 20 MIN: CPT

## 2018-01-01 PROCEDURE — 99232 SBSQ HOSP IP/OBS MODERATE 35: CPT | Performed by: NURSE PRACTITIONER

## 2018-01-01 PROCEDURE — 99214 OFFICE O/P EST MOD 30 MIN: CPT

## 2018-01-01 PROCEDURE — APPSS180 APP SPLIT SHARED TIME > 60 MINUTES: Performed by: NURSE PRACTITIONER

## 2018-01-01 PROCEDURE — 96366 THER/PROPH/DIAG IV INF ADDON: CPT

## 2018-01-01 PROCEDURE — 82607 VITAMIN B-12: CPT

## 2018-01-01 PROCEDURE — 83970 ASSAY OF PARATHORMONE: CPT

## 2018-01-01 PROCEDURE — G8427 DOCREV CUR MEDS BY ELIG CLIN: HCPCS | Performed by: INTERNAL MEDICINE

## 2018-01-01 PROCEDURE — 6360000002 HC RX W HCPCS: Performed by: EMERGENCY MEDICINE

## 2018-01-01 PROCEDURE — 92960 CARDIOVERSION ELECTRIC EXT: CPT | Performed by: INTERNAL MEDICINE

## 2018-01-01 PROCEDURE — 83540 ASSAY OF IRON: CPT

## 2018-01-01 PROCEDURE — 0JH607Z INSERTION OF CARDIAC RESYNCHRONIZATION PACEMAKER PULSE GENERATOR INTO CHEST SUBCUTANEOUS TISSUE AND FASCIA, OPEN APPROACH: ICD-10-PCS | Performed by: INTERNAL MEDICINE

## 2018-01-01 PROCEDURE — 6360000002 HC RX W HCPCS: Performed by: FAMILY MEDICINE

## 2018-01-01 PROCEDURE — 93005 ELECTROCARDIOGRAM TRACING: CPT | Performed by: STUDENT IN AN ORGANIZED HEALTH CARE EDUCATION/TRAINING PROGRAM

## 2018-01-01 PROCEDURE — 7100000011 HC PHASE II RECOVERY - ADDTL 15 MIN

## 2018-01-01 PROCEDURE — 96368 THER/DIAG CONCURRENT INF: CPT

## 2018-01-01 PROCEDURE — 1123F ACP DISCUSS/DSCN MKR DOCD: CPT | Performed by: INTERNAL MEDICINE

## 2018-01-01 PROCEDURE — 93000 ELECTROCARDIOGRAM COMPLETE: CPT | Performed by: INTERNAL MEDICINE

## 2018-01-01 PROCEDURE — 87077 CULTURE AEROBIC IDENTIFY: CPT

## 2018-01-01 PROCEDURE — C9113 INJ PANTOPRAZOLE SODIUM, VIA: HCPCS | Performed by: INTERNAL MEDICINE

## 2018-01-01 PROCEDURE — 1036F TOBACCO NON-USER: CPT | Performed by: NURSE PRACTITIONER

## 2018-01-01 PROCEDURE — 36556 INSERT NON-TUNNEL CV CATH: CPT

## 2018-01-01 PROCEDURE — C1900 LEAD, CORONARY VENOUS: HCPCS

## 2018-01-01 PROCEDURE — 84443 ASSAY THYROID STIM HORMONE: CPT

## 2018-01-01 PROCEDURE — 3700000001 HC ADD 15 MINUTES (ANESTHESIA)

## 2018-01-01 PROCEDURE — 82435 ASSAY OF BLOOD CHLORIDE: CPT

## 2018-01-01 PROCEDURE — 82550 ASSAY OF CK (CPK): CPT

## 2018-01-01 PROCEDURE — 96375 TX/PRO/DX INJ NEW DRUG ADDON: CPT

## 2018-01-01 PROCEDURE — G8400 PT W/DXA NO RESULTS DOC: HCPCS | Performed by: INTERNAL MEDICINE

## 2018-01-01 PROCEDURE — 82728 ASSAY OF FERRITIN: CPT

## 2018-01-01 PROCEDURE — C2621 PMKR, OTHER THAN SING/DUAL: HCPCS

## 2018-01-01 PROCEDURE — G8400 PT W/DXA NO RESULTS DOC: HCPCS | Performed by: NURSE PRACTITIONER

## 2018-01-01 PROCEDURE — 82565 ASSAY OF CREATININE: CPT

## 2018-01-01 PROCEDURE — C1730 CATH, EP, 19 OR FEW ELECT: HCPCS

## 2018-01-01 PROCEDURE — 6360000002 HC RX W HCPCS: Performed by: PHYSICIAN ASSISTANT

## 2018-01-01 PROCEDURE — 2720000010 HC SURG SUPPLY STERILE

## 2018-01-01 PROCEDURE — 84550 ASSAY OF BLOOD/URIC ACID: CPT

## 2018-01-01 PROCEDURE — 94761 N-INVAS EAR/PLS OXIMETRY MLT: CPT

## 2018-01-01 PROCEDURE — 7100000010 HC PHASE II RECOVERY - FIRST 15 MIN

## 2018-01-01 PROCEDURE — B51N1ZZ FLUOROSCOPY OF LEFT UPPER EXTREMITY VEINS USING LOW OSMOLAR CONTRAST: ICD-10-PCS | Performed by: INTERNAL MEDICINE

## 2018-01-01 PROCEDURE — 3700000000 HC ANESTHESIA ATTENDED CARE

## 2018-01-01 PROCEDURE — 93312 ECHO TRANSESOPHAGEAL: CPT

## 2018-01-01 PROCEDURE — 71048 X-RAY EXAM CHEST 4+ VIEWS: CPT

## 2018-01-01 PROCEDURE — 97166 OT EVAL MOD COMPLEX 45 MIN: CPT

## 2018-01-01 PROCEDURE — 82306 VITAMIN D 25 HYDROXY: CPT

## 2018-01-01 PROCEDURE — 2500000003 HC RX 250 WO HCPCS: Performed by: FAMILY MEDICINE

## 2018-01-01 PROCEDURE — APPSS45 APP SPLIT SHARED TIME 31-45 MINUTES: Performed by: NURSE PRACTITIONER

## 2018-01-01 PROCEDURE — 84132 ASSAY OF SERUM POTASSIUM: CPT

## 2018-01-01 PROCEDURE — 94640 AIRWAY INHALATION TREATMENT: CPT

## 2018-01-01 PROCEDURE — APPSS60 APP SPLIT SHARED TIME 46-60 MINUTES: Performed by: CLINICAL NURSE SPECIALIST

## 2018-01-01 PROCEDURE — 96365 THER/PROPH/DIAG IV INF INIT: CPT

## 2018-01-01 PROCEDURE — 94664 DEMO&/EVAL PT USE INHALER: CPT

## 2018-01-01 PROCEDURE — 71046 X-RAY EXAM CHEST 2 VIEWS: CPT

## 2018-01-01 PROCEDURE — 7100000001 HC PACU RECOVERY - ADDTL 15 MIN

## 2018-01-01 PROCEDURE — G8987 SELF CARE CURRENT STATUS: HCPCS

## 2018-01-01 PROCEDURE — 36592 COLLECT BLOOD FROM PICC: CPT

## 2018-01-01 PROCEDURE — 87081 CULTURE SCREEN ONLY: CPT

## 2018-01-01 PROCEDURE — C1887 CATHETER, GUIDING: HCPCS

## 2018-01-01 PROCEDURE — G8419 CALC BMI OUT NRM PARAM NOF/U: HCPCS | Performed by: INTERNAL MEDICINE

## 2018-01-01 PROCEDURE — 99222 1ST HOSP IP/OBS MODERATE 55: CPT | Performed by: THORACIC SURGERY (CARDIOTHORACIC VASCULAR SURGERY)

## 2018-01-01 PROCEDURE — 93281 PM DEVICE PROGR EVAL MULTI: CPT | Performed by: INTERNAL MEDICINE

## 2018-01-01 PROCEDURE — 1090F PRES/ABSN URINE INCON ASSESS: CPT | Performed by: NURSE PRACTITIONER

## 2018-01-01 PROCEDURE — G8419 CALC BMI OUT NRM PARAM NOF/U: HCPCS | Performed by: NURSE PRACTITIONER

## 2018-01-01 PROCEDURE — 93306 TTE W/DOPPLER COMPLETE: CPT

## 2018-01-01 PROCEDURE — C1898 LEAD, PMKR, OTHER THAN TRANS: HCPCS

## 2018-01-01 PROCEDURE — G8420 CALC BMI NORM PARAMETERS: HCPCS | Performed by: INTERNAL MEDICINE

## 2018-01-01 PROCEDURE — 85730 THROMBOPLASTIN TIME PARTIAL: CPT

## 2018-01-01 PROCEDURE — 51702 INSERT TEMP BLADDER CATH: CPT

## 2018-01-01 PROCEDURE — 4040F PNEUMOC VAC/ADMIN/RCVD: CPT | Performed by: NURSE PRACTITIONER

## 2018-01-01 PROCEDURE — A0426 ALS 1: HCPCS

## 2018-01-01 PROCEDURE — 2580000003 HC RX 258: Performed by: NURSE ANESTHETIST, CERTIFIED REGISTERED

## 2018-01-01 PROCEDURE — 2709999900 HC NON-CHARGEABLE SUPPLY

## 2018-01-01 PROCEDURE — 82746 ASSAY OF FOLIC ACID SERUM: CPT

## 2018-01-01 PROCEDURE — 97110 THERAPEUTIC EXERCISES: CPT

## 2018-01-01 PROCEDURE — B51V1ZZ FLUOROSCOPY OF OTHER VEINS USING LOW OSMOLAR CONTRAST: ICD-10-PCS | Performed by: INTERNAL MEDICINE

## 2018-01-01 PROCEDURE — 84295 ASSAY OF SERUM SODIUM: CPT

## 2018-01-01 PROCEDURE — G8427 DOCREV CUR MEDS BY ELIG CLIN: HCPCS | Performed by: NURSE PRACTITIONER

## 2018-01-01 PROCEDURE — 82533 TOTAL CORTISOL: CPT

## 2018-01-01 RX ORDER — MIDODRINE HYDROCHLORIDE 5 MG/1
5 TABLET ORAL
Status: DISCONTINUED | OUTPATIENT
Start: 2018-01-01 | End: 2018-01-01

## 2018-01-01 RX ORDER — ISOSORBIDE MONONITRATE 30 MG/1
15 TABLET, EXTENDED RELEASE ORAL DAILY PRN
Status: DISCONTINUED | OUTPATIENT
Start: 2018-01-01 | End: 2018-01-01

## 2018-01-01 RX ORDER — SODIUM POLYSTYRENE SULFONATE 15 G/60ML
15 SUSPENSION ORAL; RECTAL ONCE
Status: COMPLETED | OUTPATIENT
Start: 2018-01-01 | End: 2018-01-01

## 2018-01-01 RX ORDER — SODIUM CHLORIDE 0.9 % (FLUSH) 0.9 %
10 SYRINGE (ML) INJECTION EVERY 12 HOURS SCHEDULED
Status: DISCONTINUED | OUTPATIENT
Start: 2018-01-01 | End: 2018-01-01 | Stop reason: HOSPADM

## 2018-01-01 RX ORDER — MORPHINE SULFATE 2 MG/ML
2 INJECTION, SOLUTION INTRAMUSCULAR; INTRAVENOUS
Status: DISCONTINUED | OUTPATIENT
Start: 2018-01-01 | End: 2018-01-01 | Stop reason: HOSPADM

## 2018-01-01 RX ORDER — ONDANSETRON 2 MG/ML
4 INJECTION INTRAMUSCULAR; INTRAVENOUS EVERY 8 HOURS PRN
Status: DISCONTINUED | OUTPATIENT
Start: 2018-01-01 | End: 2018-01-01

## 2018-01-01 RX ORDER — DOCUSATE SODIUM 100 MG/1
100 CAPSULE, LIQUID FILLED ORAL 2 TIMES DAILY PRN
Status: DISCONTINUED | OUTPATIENT
Start: 2018-01-01 | End: 2018-01-01 | Stop reason: HOSPADM

## 2018-01-01 RX ORDER — METOPROLOL SUCCINATE 25 MG/1
25 TABLET, EXTENDED RELEASE ORAL DAILY
Status: DISCONTINUED | OUTPATIENT
Start: 2018-01-01 | End: 2018-01-01

## 2018-01-01 RX ORDER — BUMETANIDE 0.25 MG/ML
1 INJECTION, SOLUTION INTRAMUSCULAR; INTRAVENOUS DAILY
Status: DISCONTINUED | OUTPATIENT
Start: 2018-01-01 | End: 2018-01-01 | Stop reason: HOSPADM

## 2018-01-01 RX ORDER — LEVOTHYROXINE SODIUM 0.12 MG/1
125 TABLET ORAL
Status: CANCELLED | OUTPATIENT
Start: 2018-01-01

## 2018-01-01 RX ORDER — SODIUM CHLORIDE 0.9 % (FLUSH) 0.9 %
10 SYRINGE (ML) INJECTION PRN
Status: CANCELLED | OUTPATIENT
Start: 2018-01-01

## 2018-01-01 RX ORDER — DIPHENHYDRAMINE HYDROCHLORIDE 50 MG/ML
25 INJECTION INTRAMUSCULAR; INTRAVENOUS ONCE
Status: COMPLETED | OUTPATIENT
Start: 2018-01-01 | End: 2018-01-01

## 2018-01-01 RX ORDER — ONDANSETRON 2 MG/ML
4 INJECTION INTRAMUSCULAR; INTRAVENOUS EVERY 6 HOURS PRN
Status: DISCONTINUED | OUTPATIENT
Start: 2018-01-01 | End: 2018-01-01

## 2018-01-01 RX ORDER — SODIUM PHOSPHATE, DIBASIC AND SODIUM PHOSPHATE, MONOBASIC 7; 19 G/133ML; G/133ML
1 ENEMA RECTAL DAILY PRN
COMMUNITY

## 2018-01-01 RX ORDER — LORAZEPAM 0.5 MG/1
0.25 TABLET ORAL EVERY 6 HOURS PRN
Status: DISCONTINUED | OUTPATIENT
Start: 2018-01-01 | End: 2018-01-01 | Stop reason: HOSPADM

## 2018-01-01 RX ORDER — MIDODRINE HYDROCHLORIDE 5 MG/1
5 TABLET ORAL
Status: CANCELLED | OUTPATIENT
Start: 2018-01-01

## 2018-01-01 RX ORDER — MAGNESIUM SULFATE IN WATER 40 MG/ML
2 INJECTION, SOLUTION INTRAVENOUS ONCE
Status: COMPLETED | OUTPATIENT
Start: 2018-01-01 | End: 2018-01-01

## 2018-01-01 RX ORDER — NITROFURANTOIN MACROCRYSTALS 100 MG/1
100 CAPSULE ORAL NIGHTLY
Status: ON HOLD | COMMUNITY
End: 2018-01-01 | Stop reason: HOSPADM

## 2018-01-01 RX ORDER — 0.9 % SODIUM CHLORIDE 0.9 %
500 INTRAVENOUS SOLUTION INTRAVENOUS ONCE
Status: COMPLETED | OUTPATIENT
Start: 2018-01-01 | End: 2018-01-01

## 2018-01-01 RX ORDER — BUMETANIDE 0.25 MG/ML
2 INJECTION, SOLUTION INTRAMUSCULAR; INTRAVENOUS 2 TIMES DAILY
Status: DISCONTINUED | OUTPATIENT
Start: 2018-01-01 | End: 2018-01-01

## 2018-01-01 RX ORDER — PROPOFOL 10 MG/ML
INJECTION, EMULSION INTRAVENOUS PRN
Status: DISCONTINUED | OUTPATIENT
Start: 2018-01-01 | End: 2018-01-01 | Stop reason: SDUPTHER

## 2018-01-01 RX ORDER — LIDOCAINE HYDROCHLORIDE 20 MG/ML
INJECTION, SOLUTION INFILTRATION; PERINEURAL PRN
Status: DISCONTINUED | OUTPATIENT
Start: 2018-01-01 | End: 2018-01-01 | Stop reason: SDUPTHER

## 2018-01-01 RX ORDER — ACETAMINOPHEN 325 MG/1
650 TABLET ORAL EVERY 4 HOURS PRN
Status: DISCONTINUED | OUTPATIENT
Start: 2018-01-01 | End: 2018-01-01 | Stop reason: HOSPADM

## 2018-01-01 RX ORDER — AMIODARONE HYDROCHLORIDE 200 MG/1
200 TABLET ORAL 2 TIMES DAILY
Status: DISCONTINUED | OUTPATIENT
Start: 2018-01-01 | End: 2018-01-01

## 2018-01-01 RX ORDER — NICOTINE POLACRILEX 4 MG
15 LOZENGE BUCCAL PRN
Status: DISCONTINUED | OUTPATIENT
Start: 2018-01-01 | End: 2018-01-01 | Stop reason: HOSPADM

## 2018-01-01 RX ORDER — BUMETANIDE 0.25 MG/ML
1 INJECTION, SOLUTION INTRAMUSCULAR; INTRAVENOUS EVERY 12 HOURS
Status: DISCONTINUED | OUTPATIENT
Start: 2018-01-01 | End: 2018-01-01

## 2018-01-01 RX ORDER — METOPROLOL SUCCINATE 25 MG/1
25 TABLET, EXTENDED RELEASE ORAL 2 TIMES DAILY
Status: DISCONTINUED | OUTPATIENT
Start: 2018-01-01 | End: 2018-01-01

## 2018-01-01 RX ORDER — SODIUM CHLORIDE 0.9 % (FLUSH) 0.9 %
10 SYRINGE (ML) INJECTION EVERY 12 HOURS SCHEDULED
Status: CANCELLED | OUTPATIENT
Start: 2018-01-01

## 2018-01-01 RX ORDER — LANOLIN ALCOHOL/MO/W.PET/CERES
3 CREAM (GRAM) TOPICAL NIGHTLY PRN
Qty: 30 TABLET | Refills: 3 | Status: SHIPPED | OUTPATIENT
Start: 2018-01-01

## 2018-01-01 RX ORDER — BUMETANIDE 1 MG/1
1 TABLET ORAL ONCE
Status: DISCONTINUED | OUTPATIENT
Start: 2018-01-01 | End: 2018-01-01

## 2018-01-01 RX ORDER — SODIUM CHLORIDE 0.9 % (FLUSH) 0.9 %
10 SYRINGE (ML) INJECTION PRN
Status: DISCONTINUED | OUTPATIENT
Start: 2018-01-01 | End: 2018-01-01

## 2018-01-01 RX ORDER — LEVOTHYROXINE SODIUM 0.12 MG/1
125 TABLET ORAL
Status: DISCONTINUED | OUTPATIENT
Start: 2018-01-01 | End: 2018-01-01 | Stop reason: HOSPADM

## 2018-01-01 RX ORDER — SODIUM CHLORIDE 0.9 % (FLUSH) 0.9 %
10 SYRINGE (ML) INJECTION EVERY 12 HOURS SCHEDULED
Status: DISCONTINUED | OUTPATIENT
Start: 2018-01-01 | End: 2018-01-01

## 2018-01-01 RX ORDER — SODIUM CHLORIDE 9 MG/ML
INJECTION, SOLUTION INTRAVENOUS CONTINUOUS
Status: DISCONTINUED | OUTPATIENT
Start: 2018-01-01 | End: 2018-01-01

## 2018-01-01 RX ORDER — CHOLECALCIFEROL (VITAMIN D3) 50 MCG
10000 TABLET ORAL
Status: DISCONTINUED | OUTPATIENT
Start: 2018-01-01 | End: 2018-01-01

## 2018-01-01 RX ORDER — LEVOTHYROXINE SODIUM 0.12 MG/1
125 TABLET ORAL DAILY
Status: DISCONTINUED | OUTPATIENT
Start: 2018-01-01 | End: 2018-01-01 | Stop reason: HOSPADM

## 2018-01-01 RX ORDER — CALCIUM GLUCONATE 94 MG/ML
INJECTION, SOLUTION INTRAVENOUS
Status: COMPLETED
Start: 2018-01-01 | End: 2018-01-01

## 2018-01-01 RX ORDER — SODIUM CHLORIDE 0.9 % (FLUSH) 0.9 %
10 SYRINGE (ML) INJECTION PRN
Status: DISCONTINUED | OUTPATIENT
Start: 2018-01-01 | End: 2018-01-01 | Stop reason: SDUPTHER

## 2018-01-01 RX ORDER — TORSEMIDE 20 MG/1
40 TABLET ORAL DAILY
Qty: 180 TABLET | Refills: 2 | Status: ON HOLD | OUTPATIENT
Start: 2018-01-01 | End: 2018-01-01 | Stop reason: HOSPADM

## 2018-01-01 RX ORDER — POTASSIUM CHLORIDE 20 MEQ/1
20 TABLET, EXTENDED RELEASE ORAL 2 TIMES DAILY
Status: DISCONTINUED | OUTPATIENT
Start: 2018-01-01 | End: 2018-01-01 | Stop reason: HOSPADM

## 2018-01-01 RX ORDER — PANTOPRAZOLE SODIUM 40 MG/10ML
40 INJECTION, POWDER, LYOPHILIZED, FOR SOLUTION INTRAVENOUS DAILY
Status: DISCONTINUED | OUTPATIENT
Start: 2018-01-01 | End: 2018-01-01

## 2018-01-01 RX ORDER — POLYETHYLENE GLYCOL 3350 17 G/17G
17 POWDER, FOR SOLUTION ORAL DAILY
Status: DISCONTINUED | OUTPATIENT
Start: 2018-01-01 | End: 2018-01-01 | Stop reason: HOSPADM

## 2018-01-01 RX ORDER — BUMETANIDE 2 MG/1
2 TABLET ORAL 2 TIMES DAILY
Qty: 30 TABLET | Refills: 3 | DISCHARGE
Start: 2018-01-01

## 2018-01-01 RX ORDER — DEXTROSE MONOHYDRATE 25 G/50ML
25 INJECTION, SOLUTION INTRAVENOUS ONCE
Status: COMPLETED | OUTPATIENT
Start: 2018-01-01 | End: 2018-01-01

## 2018-01-01 RX ORDER — BUMETANIDE 0.25 MG/ML
1 INJECTION, SOLUTION INTRAMUSCULAR; INTRAVENOUS 2 TIMES DAILY
Status: DISCONTINUED | OUTPATIENT
Start: 2018-01-01 | End: 2018-01-01

## 2018-01-01 RX ORDER — DOCUSATE SODIUM 100 MG/1
100 CAPSULE, LIQUID FILLED ORAL 2 TIMES DAILY PRN
COMMUNITY

## 2018-01-01 RX ORDER — ACETAMINOPHEN 325 MG/1
650 TABLET ORAL EVERY 4 HOURS PRN
Status: DISCONTINUED | OUTPATIENT
Start: 2018-01-01 | End: 2018-01-01

## 2018-01-01 RX ORDER — BUMETANIDE 0.25 MG/ML
2 INJECTION, SOLUTION INTRAMUSCULAR; INTRAVENOUS 3 TIMES DAILY
Status: DISCONTINUED | OUTPATIENT
Start: 2018-01-01 | End: 2018-01-01

## 2018-01-01 RX ORDER — NITROFURANTOIN 25; 75 MG/1; MG/1
100 CAPSULE ORAL 2 TIMES DAILY
COMMUNITY
Start: 2018-01-01 | End: 2018-01-01

## 2018-01-01 RX ORDER — BISACODYL 10 MG
10 SUPPOSITORY, RECTAL RECTAL DAILY PRN
Status: DISCONTINUED | OUTPATIENT
Start: 2018-01-01 | End: 2018-01-01

## 2018-01-01 RX ORDER — VITS A,C,E/LUTEIN/MINERALS 300MCG-200
1 TABLET ORAL DAILY
Status: DISCONTINUED | OUTPATIENT
Start: 2018-01-01 | End: 2018-01-01

## 2018-01-01 RX ORDER — M-VIT,TX,IRON,MINS/CALC/FOLIC 27MG-0.4MG
1 TABLET ORAL DAILY
Status: DISCONTINUED | OUTPATIENT
Start: 2018-01-01 | End: 2018-01-01 | Stop reason: HOSPADM

## 2018-01-01 RX ORDER — LANOLIN ALCOHOL/MO/W.PET/CERES
3 CREAM (GRAM) TOPICAL NIGHTLY PRN
Status: DISCONTINUED | OUTPATIENT
Start: 2018-01-01 | End: 2018-01-01 | Stop reason: HOSPADM

## 2018-01-01 RX ORDER — ACETAMINOPHEN 325 MG/1
650 TABLET ORAL EVERY 6 HOURS PRN
Status: DISCONTINUED | OUTPATIENT
Start: 2018-01-01 | End: 2018-01-01 | Stop reason: SDUPTHER

## 2018-01-01 RX ORDER — ACETAMINOPHEN 325 MG/1
650 TABLET ORAL EVERY 4 HOURS PRN
COMMUNITY

## 2018-01-01 RX ORDER — MIDODRINE HYDROCHLORIDE 5 MG/1
5 TABLET ORAL
Status: DISCONTINUED | OUTPATIENT
Start: 2018-01-01 | End: 2018-01-01 | Stop reason: HOSPADM

## 2018-01-01 RX ORDER — DEXTROSE MONOHYDRATE 25 G/50ML
12.5 INJECTION, SOLUTION INTRAVENOUS PRN
Status: DISCONTINUED | OUTPATIENT
Start: 2018-01-01 | End: 2018-01-01

## 2018-01-01 RX ORDER — SODIUM CHLORIDE 9 MG/ML
INJECTION, SOLUTION INTRAVENOUS CONTINUOUS PRN
Status: DISCONTINUED | OUTPATIENT
Start: 2018-01-01 | End: 2018-01-01 | Stop reason: SDUPTHER

## 2018-01-01 RX ORDER — M-VIT,TX,IRON,MINS/CALC/FOLIC 27MG-0.4MG
1 TABLET ORAL DAILY
Status: CANCELLED | OUTPATIENT
Start: 2018-01-01

## 2018-01-01 RX ORDER — MULTIVIT,IRON,MINERALS/LUTEIN
1 TABLET ORAL EVERY MORNING
COMMUNITY
End: 2018-01-01 | Stop reason: ALTCHOICE

## 2018-01-01 RX ORDER — METOPROLOL SUCCINATE 25 MG/1
25 TABLET, EXTENDED RELEASE ORAL 2 TIMES DAILY
Status: DISCONTINUED | OUTPATIENT
Start: 2018-01-01 | End: 2018-01-01 | Stop reason: HOSPADM

## 2018-01-01 RX ORDER — PETROLATUM 42 G/100G
OINTMENT TOPICAL PRN
COMMUNITY

## 2018-01-01 RX ORDER — HYDRALAZINE HYDROCHLORIDE 25 MG/1
12.5 TABLET, FILM COATED ORAL EVERY 8 HOURS SCHEDULED
Status: DISCONTINUED | OUTPATIENT
Start: 2018-01-01 | End: 2018-01-01

## 2018-01-01 RX ORDER — SODIUM CHLORIDE 0.9 % (FLUSH) 0.9 %
10 SYRINGE (ML) INJECTION PRN
Status: DISCONTINUED | OUTPATIENT
Start: 2018-01-01 | End: 2018-01-01 | Stop reason: HOSPADM

## 2018-01-01 RX ORDER — HYDRALAZINE HYDROCHLORIDE 25 MG/1
12.5 TABLET, FILM COATED ORAL EVERY 8 HOURS SCHEDULED
Qty: 90 TABLET | Refills: 3 | Status: ON HOLD | DISCHARGE
Start: 2018-01-01 | End: 2018-01-01 | Stop reason: HOSPADM

## 2018-01-01 RX ORDER — DOCUSATE SODIUM 100 MG/1
100 CAPSULE, LIQUID FILLED ORAL 2 TIMES DAILY PRN
Status: DISCONTINUED | OUTPATIENT
Start: 2018-01-01 | End: 2018-01-01

## 2018-01-01 RX ORDER — CHOLECALCIFEROL (VITAMIN D3) 50 MCG
2000 TABLET ORAL DAILY
Status: DISCONTINUED | OUTPATIENT
Start: 2018-01-01 | End: 2018-01-01

## 2018-01-01 RX ORDER — 0.9 % SODIUM CHLORIDE 0.9 %
10 VIAL (ML) INJECTION PRN
Status: DISCONTINUED | OUTPATIENT
Start: 2018-01-01 | End: 2018-01-01 | Stop reason: HOSPADM

## 2018-01-01 RX ORDER — FENTANYL CITRATE 50 UG/ML
25 INJECTION, SOLUTION INTRAMUSCULAR; INTRAVENOUS ONCE
Status: COMPLETED | OUTPATIENT
Start: 2018-01-01 | End: 2018-01-01

## 2018-01-01 RX ORDER — DILTIAZEM HYDROCHLORIDE 5 MG/ML
10 INJECTION INTRAVENOUS ONCE
Status: COMPLETED | OUTPATIENT
Start: 2018-01-01 | End: 2018-01-01

## 2018-01-01 RX ORDER — SODIUM CHLORIDE 0.9 % (FLUSH) 0.9 %
10 SYRINGE (ML) INJECTION 2 TIMES DAILY
Status: DISCONTINUED | OUTPATIENT
Start: 2018-01-01 | End: 2018-01-01 | Stop reason: HOSPADM

## 2018-01-01 RX ORDER — ACETAMINOPHEN 325 MG/1
650 TABLET ORAL EVERY 4 HOURS PRN
Status: DISCONTINUED | OUTPATIENT
Start: 2018-01-01 | End: 2018-01-01 | Stop reason: SDUPTHER

## 2018-01-01 RX ORDER — DIPHENHYDRAMINE HCL 25 MG
25 TABLET ORAL EVERY 6 HOURS PRN
Status: DISCONTINUED | OUTPATIENT
Start: 2018-01-01 | End: 2018-01-01 | Stop reason: HOSPADM

## 2018-01-01 RX ORDER — LORAZEPAM 0.5 MG/1
0.25 TABLET ORAL EVERY 6 HOURS PRN
Status: DISCONTINUED | OUTPATIENT
Start: 2018-01-01 | End: 2018-01-01

## 2018-01-01 RX ORDER — DOCUSATE SODIUM 100 MG/1
100 CAPSULE, LIQUID FILLED ORAL 2 TIMES DAILY
Status: DISCONTINUED | OUTPATIENT
Start: 2018-01-01 | End: 2018-01-01 | Stop reason: HOSPADM

## 2018-01-01 RX ORDER — ANTIOX #8/OM3/DHA/EPA/LUT/ZEAX 250-2.5 MG
1 CAPSULE ORAL 2 TIMES DAILY
Status: DISCONTINUED | OUTPATIENT
Start: 2018-01-01 | End: 2018-01-01

## 2018-01-01 RX ORDER — MIDODRINE HYDROCHLORIDE 2.5 MG/1
2.5 TABLET ORAL
Status: DISCONTINUED | OUTPATIENT
Start: 2018-01-01 | End: 2018-01-01

## 2018-01-01 RX ORDER — BISACODYL 10 MG
10 SUPPOSITORY, RECTAL RECTAL DAILY
Status: DISCONTINUED | OUTPATIENT
Start: 2018-01-01 | End: 2018-01-01

## 2018-01-01 RX ORDER — BUMETANIDE 0.25 MG/ML
0.5 INJECTION, SOLUTION INTRAMUSCULAR; INTRAVENOUS ONCE
Status: COMPLETED | OUTPATIENT
Start: 2018-01-01 | End: 2018-01-01

## 2018-01-01 RX ORDER — DOBUTAMINE HYDROCHLORIDE 400 MG/100ML
2.5 INJECTION INTRAVENOUS CONTINUOUS
Status: DISCONTINUED | OUTPATIENT
Start: 2018-01-01 | End: 2018-01-01

## 2018-01-01 RX ORDER — LEVOTHYROXINE SODIUM 0.12 MG/1
125 TABLET ORAL DAILY
Status: DISCONTINUED | OUTPATIENT
Start: 2018-01-01 | End: 2018-01-01

## 2018-01-01 RX ORDER — M-VIT,TX,IRON,MINS/CALC/FOLIC 27MG-0.4MG
1 TABLET ORAL EVERY MORNING
Status: DISCONTINUED | OUTPATIENT
Start: 2018-01-01 | End: 2018-01-01 | Stop reason: HOSPADM

## 2018-01-01 RX ORDER — ONDANSETRON 2 MG/ML
4 INJECTION INTRAMUSCULAR; INTRAVENOUS EVERY 6 HOURS PRN
Status: DISCONTINUED | OUTPATIENT
Start: 2018-01-01 | End: 2018-01-01 | Stop reason: HOSPADM

## 2018-01-01 RX ORDER — 0.9 % SODIUM CHLORIDE 0.9 %
300 INTRAVENOUS SOLUTION INTRAVENOUS ONCE
Status: COMPLETED | OUTPATIENT
Start: 2018-01-01 | End: 2018-01-01

## 2018-01-01 RX ORDER — PETROLATUM 42 G/100G
OINTMENT TOPICAL 2 TIMES DAILY PRN
Status: DISCONTINUED | OUTPATIENT
Start: 2018-01-01 | End: 2018-01-01 | Stop reason: HOSPADM

## 2018-01-01 RX ORDER — MULTIVIT,IRON,MINERALS/LUTEIN
1 TABLET ORAL EVERY MORNING
Status: DISCONTINUED | OUTPATIENT
Start: 2018-01-01 | End: 2018-01-01 | Stop reason: CLARIF

## 2018-01-01 RX ORDER — BISACODYL 10 MG
10 SUPPOSITORY, RECTAL RECTAL DAILY PRN
COMMUNITY

## 2018-01-01 RX ORDER — SODIUM CHLORIDE 9 MG/ML
INJECTION, SOLUTION INTRAVENOUS CONTINUOUS
Status: DISCONTINUED | OUTPATIENT
Start: 2018-01-01 | End: 2018-01-01 | Stop reason: HOSPADM

## 2018-01-01 RX ORDER — PETROLATUM 42 G/100G
OINTMENT TOPICAL PRN
Status: DISCONTINUED | OUTPATIENT
Start: 2018-01-01 | End: 2018-01-01

## 2018-01-01 RX ORDER — LIDOCAINE HYDROCHLORIDE ANHYDROUS AND DEXTROSE MONOHYDRATE .4; 5 G/100ML; G/100ML
1 INJECTION, SOLUTION INTRAVENOUS CONTINUOUS
Status: DISCONTINUED | OUTPATIENT
Start: 2018-01-01 | End: 2018-01-01

## 2018-01-01 RX ORDER — BUMETANIDE 1 MG/1
3 TABLET ORAL DAILY
Status: ON HOLD | COMMUNITY
End: 2018-01-01 | Stop reason: HOSPADM

## 2018-01-01 RX ORDER — CHOLECALCIFEROL (VITAMIN D3) 50 MCG
2000 TABLET ORAL
Status: DISCONTINUED | OUTPATIENT
Start: 2018-01-01 | End: 2018-01-01 | Stop reason: SDUPTHER

## 2018-01-01 RX ORDER — PROPOFOL 10 MG/ML
INJECTION, EMULSION INTRAVENOUS CONTINUOUS PRN
Status: DISCONTINUED | OUTPATIENT
Start: 2018-01-01 | End: 2018-01-01 | Stop reason: SDUPTHER

## 2018-01-01 RX ORDER — BUMETANIDE 0.25 MG/ML
0.5 INJECTION, SOLUTION INTRAMUSCULAR; INTRAVENOUS DAILY
Status: DISCONTINUED | OUTPATIENT
Start: 2018-01-01 | End: 2018-01-01 | Stop reason: HOSPADM

## 2018-01-01 RX ORDER — BUMETANIDE 0.25 MG/ML
2 INJECTION, SOLUTION INTRAMUSCULAR; INTRAVENOUS ONCE
Status: COMPLETED | OUTPATIENT
Start: 2018-01-01 | End: 2018-01-01

## 2018-01-01 RX ORDER — LEVOTHYROXINE SODIUM 0.12 MG/1
125 TABLET ORAL
Status: DISCONTINUED | OUTPATIENT
Start: 2018-01-01 | End: 2018-01-01

## 2018-01-01 RX ORDER — HYDRALAZINE HYDROCHLORIDE 25 MG/1
12.5 TABLET, FILM COATED ORAL EVERY 8 HOURS SCHEDULED
Status: DISCONTINUED | OUTPATIENT
Start: 2018-01-01 | End: 2018-01-01 | Stop reason: HOSPADM

## 2018-01-01 RX ORDER — BUMETANIDE 0.25 MG/ML
0.5 INJECTION, SOLUTION INTRAMUSCULAR; INTRAVENOUS 2 TIMES DAILY
Status: DISCONTINUED | OUTPATIENT
Start: 2018-01-01 | End: 2018-01-01

## 2018-01-01 RX ORDER — IRON ASPGLY,PS/C/B12/FA/CA/SUC 150-25-1
1 CAPSULE ORAL 2 TIMES DAILY WITH MEALS
Status: DISCONTINUED | OUTPATIENT
Start: 2018-01-01 | End: 2018-01-01 | Stop reason: HOSPADM

## 2018-01-01 RX ORDER — AMIODARONE HYDROCHLORIDE 200 MG/1
400 TABLET ORAL 2 TIMES DAILY
Status: DISCONTINUED | OUTPATIENT
Start: 2018-01-01 | End: 2018-01-01

## 2018-01-01 RX ORDER — DOCUSATE SODIUM 100 MG/1
100 CAPSULE, LIQUID FILLED ORAL 2 TIMES DAILY PRN
Status: CANCELLED | OUTPATIENT
Start: 2018-01-01

## 2018-01-01 RX ORDER — FENTANYL CITRATE 50 UG/ML
INJECTION, SOLUTION INTRAMUSCULAR; INTRAVENOUS PRN
Status: DISCONTINUED | OUTPATIENT
Start: 2018-01-01 | End: 2018-01-01 | Stop reason: SDUPTHER

## 2018-01-01 RX ORDER — DEXTROSE MONOHYDRATE 50 MG/ML
100 INJECTION, SOLUTION INTRAVENOUS PRN
Status: DISCONTINUED | OUTPATIENT
Start: 2018-01-01 | End: 2018-01-01 | Stop reason: HOSPADM

## 2018-01-01 RX ORDER — SODIUM CHLORIDE 9 MG/ML
INJECTION, SOLUTION INTRAVENOUS CONTINUOUS
Status: CANCELLED | OUTPATIENT
Start: 2018-01-01 | End: 2018-01-01

## 2018-01-01 RX ORDER — DIPHENHYDRAMINE HCL 25 MG
25 TABLET ORAL EVERY 6 HOURS PRN
DISCHARGE
Start: 2018-01-01 | End: 2018-08-14

## 2018-01-01 RX ORDER — SODIUM BICARBONATE 650 MG/1
650 TABLET ORAL 2 TIMES DAILY
Status: DISCONTINUED | OUTPATIENT
Start: 2018-01-01 | End: 2018-01-01

## 2018-01-01 RX ORDER — LORAZEPAM 0.5 MG/1
0.25 TABLET ORAL EVERY 6 HOURS PRN
Status: CANCELLED | OUTPATIENT
Start: 2018-01-01

## 2018-01-01 RX ORDER — BUMETANIDE 0.25 MG/ML
1 INJECTION, SOLUTION INTRAMUSCULAR; INTRAVENOUS ONCE
Status: COMPLETED | OUTPATIENT
Start: 2018-01-01 | End: 2018-01-01

## 2018-01-01 RX ORDER — CHOLECALCIFEROL (VITAMIN D3) 50 MCG
2000 TABLET ORAL WEEKLY
Status: DISCONTINUED | OUTPATIENT
Start: 2018-01-01 | End: 2018-01-01 | Stop reason: HOSPADM

## 2018-01-01 RX ORDER — ALBUTEROL SULFATE 2.5 MG/3ML
5 SOLUTION RESPIRATORY (INHALATION) ONCE
Status: COMPLETED | OUTPATIENT
Start: 2018-01-01 | End: 2018-01-01

## 2018-01-01 RX ORDER — BUMETANIDE 1 MG/1
2 TABLET ORAL 2 TIMES DAILY
Status: DISCONTINUED | OUTPATIENT
Start: 2018-01-01 | End: 2018-01-01 | Stop reason: HOSPADM

## 2018-01-01 RX ORDER — DOCUSATE SODIUM 100 MG/1
100 CAPSULE, LIQUID FILLED ORAL DAILY
Status: DISCONTINUED | OUTPATIENT
Start: 2018-01-01 | End: 2018-01-01

## 2018-01-01 RX ORDER — GUAIFENESIN AND DEXTROMETHORPHAN HYDROBROMIDE 100; 10 MG/5ML; MG/5ML
10 SOLUTION ORAL EVERY 4 HOURS PRN
Status: ON HOLD | COMMUNITY
End: 2018-01-01 | Stop reason: HOSPADM

## 2018-01-01 RX ORDER — PETROLATUM 42 G/100G
OINTMENT TOPICAL
Refills: 0 | DISCHARGE
Start: 2018-01-01

## 2018-01-01 RX ORDER — FUROSEMIDE 10 MG/ML
40 INJECTION INTRAMUSCULAR; INTRAVENOUS ONCE
Status: COMPLETED | OUTPATIENT
Start: 2018-01-01 | End: 2018-01-01

## 2018-01-01 RX ORDER — POTASSIUM CHLORIDE 20 MEQ/1
20 TABLET, EXTENDED RELEASE ORAL 2 TIMES DAILY
Qty: 60 TABLET | Refills: 3 | DISCHARGE
Start: 2018-01-01

## 2018-01-01 RX ORDER — TORSEMIDE 20 MG/1
40 TABLET ORAL DAILY
Status: DISCONTINUED | OUTPATIENT
Start: 2018-01-01 | End: 2018-01-01 | Stop reason: HOSPADM

## 2018-01-01 RX ORDER — TORSEMIDE 20 MG/1
40 TABLET ORAL DAILY PRN
Qty: 30 TABLET | Refills: 3 | Status: SHIPPED | OUTPATIENT
Start: 2018-01-01 | End: 2018-01-01 | Stop reason: ALTCHOICE

## 2018-01-01 RX ORDER — DEXTROSE MONOHYDRATE 25 G/50ML
12.5 INJECTION, SOLUTION INTRAVENOUS PRN
Status: DISCONTINUED | OUTPATIENT
Start: 2018-01-01 | End: 2018-01-01 | Stop reason: HOSPADM

## 2018-01-01 RX ORDER — SODIUM CHLORIDE 0.9 % (FLUSH) 0.9 %
10 SYRINGE (ML) INJECTION EVERY 12 HOURS SCHEDULED
Status: DISCONTINUED | OUTPATIENT
Start: 2018-01-01 | End: 2018-01-01 | Stop reason: SDUPTHER

## 2018-01-01 RX ORDER — MULTIVIT,IRON,MINERALS/LUTEIN
1 TABLET ORAL EVERY MORNING
Status: DISCONTINUED | OUTPATIENT
Start: 2018-01-01 | End: 2018-01-01

## 2018-01-01 RX ORDER — VITS A,C,E/LUTEIN/MINERALS 300MCG-200
1 TABLET ORAL DAILY
Status: DISCONTINUED | OUTPATIENT
Start: 2018-01-01 | End: 2018-01-01 | Stop reason: HOSPADM

## 2018-01-01 RX ORDER — FUROSEMIDE 10 MG/ML
20 INJECTION INTRAMUSCULAR; INTRAVENOUS 2 TIMES DAILY
Status: DISCONTINUED | OUTPATIENT
Start: 2018-01-01 | End: 2018-01-01

## 2018-01-01 RX ORDER — DIPHENHYDRAMINE HYDROCHLORIDE 50 MG/ML
25 INJECTION INTRAMUSCULAR; INTRAVENOUS EVERY 6 HOURS PRN
Status: DISCONTINUED | OUTPATIENT
Start: 2018-01-01 | End: 2018-01-01 | Stop reason: HOSPADM

## 2018-01-01 RX ORDER — MIDAZOLAM HYDROCHLORIDE 1 MG/ML
INJECTION INTRAMUSCULAR; INTRAVENOUS PRN
Status: DISCONTINUED | OUTPATIENT
Start: 2018-01-01 | End: 2018-01-01 | Stop reason: SDUPTHER

## 2018-01-01 RX ORDER — SODIUM CHLORIDE 9 MG/ML
INJECTION, SOLUTION INTRAVENOUS CONTINUOUS
Status: CANCELLED | OUTPATIENT
Start: 2018-01-01

## 2018-01-01 RX ORDER — VITS A,C,E/LUTEIN/MINERALS 300MCG-200
1 TABLET ORAL DAILY
Status: CANCELLED | OUTPATIENT
Start: 2018-01-01

## 2018-01-01 RX ORDER — CALCIUM GLUCONATE 94 MG/ML
1 INJECTION, SOLUTION INTRAVENOUS ONCE
Status: COMPLETED | OUTPATIENT
Start: 2018-01-01 | End: 2018-01-01

## 2018-01-01 RX ORDER — BUMETANIDE 0.25 MG/ML
2 INJECTION, SOLUTION INTRAMUSCULAR; INTRAVENOUS 2 TIMES DAILY
Status: COMPLETED | OUTPATIENT
Start: 2018-01-01 | End: 2018-01-01

## 2018-01-01 RX ORDER — ACETAMINOPHEN 325 MG/1
650 TABLET ORAL EVERY 4 HOURS PRN
Status: CANCELLED | OUTPATIENT
Start: 2018-01-01

## 2018-01-01 RX ORDER — M-VIT,TX,IRON,MINS/CALC/FOLIC 27MG-0.4MG
1 TABLET ORAL DAILY
Status: DISCONTINUED | OUTPATIENT
Start: 2018-01-01 | End: 2018-01-01

## 2018-01-01 RX ORDER — DEXTROSE MONOHYDRATE 50 MG/ML
100 INJECTION, SOLUTION INTRAVENOUS PRN
Status: DISCONTINUED | OUTPATIENT
Start: 2018-01-01 | End: 2018-01-01

## 2018-01-01 RX ORDER — POLYETHYLENE GLYCOL 3350 17 G/17G
17 POWDER, FOR SOLUTION ORAL DAILY
Qty: 527 G | Refills: 1 | DISCHARGE
Start: 2018-01-01 | End: 2018-08-15

## 2018-01-01 RX ADMIN — FENTANYL CITRATE 20 MCG: 50 INJECTION, SOLUTION INTRAMUSCULAR; INTRAVENOUS at 09:53

## 2018-01-01 RX ADMIN — METOPROLOL SUCCINATE 25 MG: 25 TABLET, FILM COATED, EXTENDED RELEASE ORAL at 10:46

## 2018-01-01 RX ADMIN — APIXABAN 2.5 MG: 5 TABLET, FILM COATED ORAL at 20:35

## 2018-01-01 RX ADMIN — MULTIPLE VITAMINS W/ MINERALS TAB 1 TABLET: TAB at 08:36

## 2018-01-01 RX ADMIN — BUMETANIDE 2 MG: 0.25 INJECTION INTRAMUSCULAR; INTRAVENOUS at 21:01

## 2018-01-01 RX ADMIN — BUMETANIDE 2 MG: 0.25 INJECTION INTRAMUSCULAR; INTRAVENOUS at 22:52

## 2018-01-01 RX ADMIN — DOCUSATE SODIUM 100 MG: 100 CAPSULE, LIQUID FILLED ORAL at 08:58

## 2018-01-01 RX ADMIN — BUMETANIDE 2 MG: 0.25 INJECTION INTRAMUSCULAR; INTRAVENOUS at 08:14

## 2018-01-01 RX ADMIN — BUMETANIDE 1 MG: 0.25 INJECTION INTRAMUSCULAR; INTRAVENOUS at 16:44

## 2018-01-01 RX ADMIN — DIPHENHYDRAMINE HYDROCHLORIDE 25 MG: 50 INJECTION, SOLUTION INTRAMUSCULAR; INTRAVENOUS at 00:59

## 2018-01-01 RX ADMIN — APIXABAN 2.5 MG: 5 TABLET, FILM COATED ORAL at 08:25

## 2018-01-01 RX ADMIN — BUMETANIDE 2 MG: 0.25 INJECTION INTRAMUSCULAR; INTRAVENOUS at 20:21

## 2018-01-01 RX ADMIN — BUMETANIDE 2 MG: 0.25 INJECTION INTRAMUSCULAR; INTRAVENOUS at 17:07

## 2018-01-01 RX ADMIN — LEVOTHYROXINE SODIUM 125 MCG: 125 TABLET ORAL at 05:35

## 2018-01-01 RX ADMIN — DIPHENHYDRAMINE HYDROCHLORIDE 25 MG: 50 INJECTION, SOLUTION INTRAMUSCULAR; INTRAVENOUS at 08:43

## 2018-01-01 RX ADMIN — Medication 2000 UNITS: at 09:48

## 2018-01-01 RX ADMIN — APIXABAN 2.5 MG: 5 TABLET, FILM COATED ORAL at 20:20

## 2018-01-01 RX ADMIN — MIDODRINE HYDROCHLORIDE 2.5 MG: 2.5 TABLET ORAL at 08:56

## 2018-01-01 RX ADMIN — Medication 1 TABLET: at 08:46

## 2018-01-01 RX ADMIN — Medication 10 ML: at 08:25

## 2018-01-01 RX ADMIN — Medication 10 ML: at 19:52

## 2018-01-01 RX ADMIN — HYDRALAZINE HYDROCHLORIDE 12.5 MG: 25 TABLET, FILM COATED ORAL at 21:22

## 2018-01-01 RX ADMIN — BUMETANIDE 2 MG: 0.25 INJECTION INTRAMUSCULAR; INTRAVENOUS at 20:50

## 2018-01-01 RX ADMIN — BUMETANIDE 2 MG: 1 TABLET ORAL at 09:27

## 2018-01-01 RX ADMIN — Medication 1 TABLET: at 21:24

## 2018-01-01 RX ADMIN — DIPHENHYDRAMINE HCL 25 MG: 25 TABLET ORAL at 13:06

## 2018-01-01 RX ADMIN — LEVOTHYROXINE SODIUM 125 MCG: 125 TABLET ORAL at 06:17

## 2018-01-01 RX ADMIN — VITAMIN D, TAB 1000IU (100/BT) 1000 UNITS: 25 TAB at 09:31

## 2018-01-01 RX ADMIN — ACETAMINOPHEN 650 MG: 325 TABLET ORAL at 03:10

## 2018-01-01 RX ADMIN — MIDAZOLAM HYDROCHLORIDE 1 MG: 1 INJECTION, SOLUTION INTRAMUSCULAR; INTRAVENOUS at 09:53

## 2018-01-01 RX ADMIN — BUMETANIDE 2 MG: 0.25 INJECTION INTRAMUSCULAR; INTRAVENOUS at 08:29

## 2018-01-01 RX ADMIN — LEVOTHYROXINE SODIUM 125 MCG: 125 TABLET ORAL at 05:26

## 2018-01-01 RX ADMIN — APIXABAN 2.5 MG: 5 TABLET, FILM COATED ORAL at 20:29

## 2018-01-01 RX ADMIN — APIXABAN 2.5 MG: 5 TABLET, FILM COATED ORAL at 09:07

## 2018-01-01 RX ADMIN — MIDODRINE HYDROCHLORIDE 2.5 MG: 2.5 TABLET ORAL at 12:36

## 2018-01-01 RX ADMIN — Medication 10 ML: at 20:06

## 2018-01-01 RX ADMIN — LEVOTHYROXINE SODIUM 125 MCG: 125 TABLET ORAL at 06:42

## 2018-01-01 RX ADMIN — METOPROLOL SUCCINATE 25 MG: 25 TABLET, FILM COATED, EXTENDED RELEASE ORAL at 21:21

## 2018-01-01 RX ADMIN — Medication 10 ML: at 09:31

## 2018-01-01 RX ADMIN — Medication 10 ML: at 09:54

## 2018-01-01 RX ADMIN — APIXABAN 2.5 MG: 5 TABLET, FILM COATED ORAL at 00:06

## 2018-01-01 RX ADMIN — DIPHENHYDRAMINE HCL 25 MG: 25 TABLET ORAL at 00:41

## 2018-01-01 RX ADMIN — METOPROLOL SUCCINATE 25 MG: 25 TABLET, FILM COATED, EXTENDED RELEASE ORAL at 15:38

## 2018-01-01 RX ADMIN — BUMETANIDE 1 MG: 0.25 INJECTION INTRAMUSCULAR; INTRAVENOUS at 13:00

## 2018-01-01 RX ADMIN — PANTOPRAZOLE SODIUM 40 MG: 40 INJECTION, POWDER, FOR SOLUTION INTRAVENOUS at 09:07

## 2018-01-01 RX ADMIN — APIXABAN 2.5 MG: 5 TABLET, FILM COATED ORAL at 10:27

## 2018-01-01 RX ADMIN — Medication 10 ML: at 22:29

## 2018-01-01 RX ADMIN — APIXABAN 2.5 MG: 5 TABLET, FILM COATED ORAL at 20:31

## 2018-01-01 RX ADMIN — Medication 10 ML: at 08:57

## 2018-01-01 RX ADMIN — MULTIPLE VITAMINS W/ MINERALS TAB 1 TABLET: TAB at 08:14

## 2018-01-01 RX ADMIN — Medication 1 CAPSULE: at 16:27

## 2018-01-01 RX ADMIN — APIXABAN 2.5 MG: 5 TABLET, FILM COATED ORAL at 09:06

## 2018-01-01 RX ADMIN — MULTIPLE VITAMINS W/ MINERALS TAB 1 TABLET: TAB at 08:39

## 2018-01-01 RX ADMIN — LEVOTHYROXINE SODIUM 125 MCG: 125 TABLET ORAL at 13:34

## 2018-01-01 RX ADMIN — Medication 1 CAPSULE: at 08:14

## 2018-01-01 RX ADMIN — Medication 10 ML: at 09:23

## 2018-01-01 RX ADMIN — Medication 1 CAPSULE: at 08:04

## 2018-01-01 RX ADMIN — DOCUSATE SODIUM 100 MG: 100 CAPSULE, LIQUID FILLED ORAL at 20:13

## 2018-01-01 RX ADMIN — BUMETANIDE 2 MG: 0.25 INJECTION INTRAMUSCULAR; INTRAVENOUS at 15:03

## 2018-01-01 RX ADMIN — MIDODRINE HYDROCHLORIDE 2.5 MG: 2.5 TABLET ORAL at 07:58

## 2018-01-01 RX ADMIN — BUMETANIDE 2 MG: 0.25 INJECTION INTRAMUSCULAR; INTRAVENOUS at 08:47

## 2018-01-01 RX ADMIN — Medication 1 CAPSULE: at 08:29

## 2018-01-01 RX ADMIN — Medication 1 TABLET: at 21:22

## 2018-01-01 RX ADMIN — Medication 10 ML: at 19:56

## 2018-01-01 RX ADMIN — HYDRALAZINE HYDROCHLORIDE 12.5 MG: 25 TABLET, FILM COATED ORAL at 13:50

## 2018-01-01 RX ADMIN — CALCIUM GLUCONATE 1 G: 98 INJECTION, SOLUTION INTRAVENOUS at 18:02

## 2018-01-01 RX ADMIN — DOCUSATE SODIUM 100 MG: 100 CAPSULE, LIQUID FILLED ORAL at 09:00

## 2018-01-01 RX ADMIN — DIPHENHYDRAMINE HCL 25 MG: 25 TABLET ORAL at 19:40

## 2018-01-01 RX ADMIN — SODIUM CHLORIDE: 9 INJECTION, SOLUTION INTRAVENOUS at 09:23

## 2018-01-01 RX ADMIN — SODIUM CHLORIDE: 9 INJECTION, SOLUTION INTRAVENOUS at 09:35

## 2018-01-01 RX ADMIN — Medication 10 ML: at 09:41

## 2018-01-01 RX ADMIN — TRIMETHOBENZAMIDE HYDROCHLORIDE 200 MG: 100 INJECTION INTRAMUSCULAR at 09:53

## 2018-01-01 RX ADMIN — APIXABAN 2.5 MG: 5 TABLET, FILM COATED ORAL at 09:54

## 2018-01-01 RX ADMIN — MIDODRINE HYDROCHLORIDE 2.5 MG: 2.5 TABLET ORAL at 17:41

## 2018-01-01 RX ADMIN — MULTIPLE VITAMINS W/ MINERALS TAB 1 TABLET: TAB at 10:27

## 2018-01-01 RX ADMIN — MIDODRINE HYDROCHLORIDE 2.5 MG: 2.5 TABLET ORAL at 12:23

## 2018-01-01 RX ADMIN — LEVOTHYROXINE SODIUM 125 MCG: 125 TABLET ORAL at 06:18

## 2018-01-01 RX ADMIN — INSULIN HUMAN 5 UNITS: 100 INJECTION, SOLUTION PARENTERAL at 10:01

## 2018-01-01 RX ADMIN — Medication 10 ML: at 20:16

## 2018-01-01 RX ADMIN — AMIODARONE HYDROCHLORIDE 1 MG/MIN: 50 INJECTION, SOLUTION INTRAVENOUS at 18:27

## 2018-01-01 RX ADMIN — BUMETANIDE 2 MG: 0.25 INJECTION INTRAMUSCULAR; INTRAVENOUS at 13:33

## 2018-01-01 RX ADMIN — FUROSEMIDE 40 MG: 10 INJECTION, SOLUTION INTRAMUSCULAR; INTRAVENOUS at 12:22

## 2018-01-01 RX ADMIN — Medication 10 ML: at 09:42

## 2018-01-01 RX ADMIN — APIXABAN 2.5 MG: 5 TABLET, FILM COATED ORAL at 13:07

## 2018-01-01 RX ADMIN — Medication 10 ML: at 09:05

## 2018-01-01 RX ADMIN — MIDODRINE HYDROCHLORIDE 5 MG: 5 TABLET ORAL at 12:50

## 2018-01-01 RX ADMIN — BUMETANIDE 2 MG: 0.25 INJECTION INTRAMUSCULAR; INTRAVENOUS at 09:48

## 2018-01-01 RX ADMIN — CEFEPIME HYDROCHLORIDE 1 G: 1 INJECTION, POWDER, FOR SOLUTION INTRAMUSCULAR; INTRAVENOUS at 17:00

## 2018-01-01 RX ADMIN — LIDOCAINE HYDROCHLORIDE 40 MG: 20 INJECTION, SOLUTION INFILTRATION; PERINEURAL at 12:37

## 2018-01-01 RX ADMIN — BUMETANIDE 2 MG: 0.25 INJECTION INTRAMUSCULAR; INTRAVENOUS at 14:37

## 2018-01-01 RX ADMIN — BUMETANIDE 2 MG: 0.25 INJECTION INTRAMUSCULAR; INTRAVENOUS at 13:54

## 2018-01-01 RX ADMIN — Medication 10 ML: at 20:36

## 2018-01-01 RX ADMIN — BUMETANIDE 0.5 MG: 0.25 INJECTION INTRAMUSCULAR; INTRAVENOUS at 20:06

## 2018-01-01 RX ADMIN — ACETAMINOPHEN 650 MG: 325 TABLET, FILM COATED ORAL at 01:15

## 2018-01-01 RX ADMIN — BUMETANIDE 1 MG: 0.25 INJECTION INTRAMUSCULAR; INTRAVENOUS at 10:27

## 2018-01-01 RX ADMIN — APIXABAN 2.5 MG: 5 TABLET, FILM COATED ORAL at 09:42

## 2018-01-01 RX ADMIN — BUMETANIDE 2 MG: 0.25 INJECTION INTRAMUSCULAR; INTRAVENOUS at 09:41

## 2018-01-01 RX ADMIN — APIXABAN 2.5 MG: 5 TABLET, FILM COATED ORAL at 21:22

## 2018-01-01 RX ADMIN — LEVOTHYROXINE SODIUM 125 MCG: 125 TABLET ORAL at 06:58

## 2018-01-01 RX ADMIN — LEVOTHYROXINE SODIUM 125 MCG: 125 TABLET ORAL at 06:29

## 2018-01-01 RX ADMIN — BUMETANIDE 2 MG: 0.25 INJECTION INTRAMUSCULAR; INTRAVENOUS at 14:00

## 2018-01-01 RX ADMIN — BUMETANIDE 1 MG: 0.25 INJECTION INTRAMUSCULAR; INTRAVENOUS at 20:31

## 2018-01-01 RX ADMIN — METOPROLOL SUCCINATE 25 MG: 25 TABLET, FILM COATED, EXTENDED RELEASE ORAL at 20:18

## 2018-01-01 RX ADMIN — Medication 1 TABLET: at 08:40

## 2018-01-01 RX ADMIN — APIXABAN 2.5 MG: 5 TABLET, FILM COATED ORAL at 21:36

## 2018-01-01 RX ADMIN — Medication 1 CAPSULE: at 08:25

## 2018-01-01 RX ADMIN — PETROLATUM: 42 OINTMENT TOPICAL at 16:12

## 2018-01-01 RX ADMIN — DOCUSATE SODIUM 100 MG: 100 CAPSULE, LIQUID FILLED ORAL at 17:42

## 2018-01-01 RX ADMIN — HYDRALAZINE HYDROCHLORIDE 12.5 MG: 25 TABLET, FILM COATED ORAL at 16:22

## 2018-01-01 RX ADMIN — DILTIAZEM HYDROCHLORIDE 10 MG: 5 INJECTION INTRAVENOUS at 09:53

## 2018-01-01 RX ADMIN — Medication 10 ML: at 20:58

## 2018-01-01 RX ADMIN — SODIUM CHLORIDE: 9 INJECTION, SOLUTION INTRAVENOUS at 06:52

## 2018-01-01 RX ADMIN — Medication 2000 UNITS: at 09:00

## 2018-01-01 RX ADMIN — HYDRALAZINE HYDROCHLORIDE 12.5 MG: 25 TABLET ORAL at 21:19

## 2018-01-01 RX ADMIN — Medication 10 ML: at 21:36

## 2018-01-01 RX ADMIN — DOBUTAMINE HYDROCHLORIDE 2.5 MCG/KG/MIN: 400 INJECTION INTRAVENOUS at 08:53

## 2018-01-01 RX ADMIN — HYDRALAZINE HYDROCHLORIDE 12.5 MG: 25 TABLET, FILM COATED ORAL at 06:20

## 2018-01-01 RX ADMIN — BUMETANIDE 0.5 MG: 0.25 INJECTION INTRAMUSCULAR; INTRAVENOUS at 08:29

## 2018-01-01 RX ADMIN — DEXTROSE MONOHYDRATE 25 G: 25 INJECTION, SOLUTION INTRAVENOUS at 10:01

## 2018-01-01 RX ADMIN — MULTIPLE VITAMINS W/ MINERALS TAB 1 TABLET: TAB at 13:34

## 2018-01-01 RX ADMIN — Medication 1 TABLET: at 09:31

## 2018-01-01 RX ADMIN — APIXABAN 2.5 MG: 5 TABLET, FILM COATED ORAL at 09:00

## 2018-01-01 RX ADMIN — SODIUM BICARBONATE 650 MG: 650 TABLET ORAL at 20:29

## 2018-01-01 RX ADMIN — SODIUM CHLORIDE: 9 INJECTION, SOLUTION INTRAVENOUS at 21:23

## 2018-01-01 RX ADMIN — Medication 10 ML: at 20:24

## 2018-01-01 RX ADMIN — ALBUTEROL SULFATE 5 MG: 2.5 SOLUTION RESPIRATORY (INHALATION) at 20:15

## 2018-01-01 RX ADMIN — LEVOTHYROXINE SODIUM 125 MCG: 125 TABLET ORAL at 06:09

## 2018-01-01 RX ADMIN — Medication 1 TABLET: at 08:37

## 2018-01-01 RX ADMIN — DIPHENHYDRAMINE HCL 25 MG: 25 TABLET ORAL at 12:02

## 2018-01-01 RX ADMIN — Medication 10 ML: at 08:15

## 2018-01-01 RX ADMIN — DOCUSATE SODIUM 100 MG: 100 CAPSULE, LIQUID FILLED ORAL at 20:50

## 2018-01-01 RX ADMIN — APIXABAN 2.5 MG: 5 TABLET, FILM COATED ORAL at 09:41

## 2018-01-01 RX ADMIN — Medication 10 ML: at 08:36

## 2018-01-01 RX ADMIN — DOCUSATE SODIUM 100 MG: 100 CAPSULE, LIQUID FILLED ORAL at 13:33

## 2018-01-01 RX ADMIN — APIXABAN 2.5 MG: 5 TABLET, FILM COATED ORAL at 20:18

## 2018-01-01 RX ADMIN — METOPROLOL SUCCINATE 25 MG: 25 TABLET, FILM COATED, EXTENDED RELEASE ORAL at 08:14

## 2018-01-01 RX ADMIN — Medication 10 ML: at 20:18

## 2018-01-01 RX ADMIN — DOBUTAMINE IN DEXTROSE 2.5 MCG/KG/MIN: 400 INJECTION, SOLUTION INTRAVENOUS at 16:07

## 2018-01-01 RX ADMIN — ACETAMINOPHEN 650 MG: 325 TABLET ORAL at 17:40

## 2018-01-01 RX ADMIN — Medication 1 CAPSULE: at 17:28

## 2018-01-01 RX ADMIN — DOCUSATE SODIUM 100 MG: 100 CAPSULE, LIQUID FILLED ORAL at 10:48

## 2018-01-01 RX ADMIN — Medication 1 TABLET: at 08:59

## 2018-01-01 RX ADMIN — Medication 10 ML: at 10:32

## 2018-01-01 RX ADMIN — APIXABAN 2.5 MG: 5 TABLET, FILM COATED ORAL at 10:03

## 2018-01-01 RX ADMIN — Medication 10 ML: at 08:01

## 2018-01-01 RX ADMIN — Medication 10 ML: at 08:59

## 2018-01-01 RX ADMIN — APIXABAN 2.5 MG: 5 TABLET, FILM COATED ORAL at 21:21

## 2018-01-01 RX ADMIN — FENTANYL CITRATE 40 MCG: 50 INJECTION, SOLUTION INTRAMUSCULAR; INTRAVENOUS at 10:05

## 2018-01-01 RX ADMIN — SODIUM BICARBONATE: 84 INJECTION, SOLUTION INTRAVENOUS at 08:56

## 2018-01-01 RX ADMIN — APIXABAN 2.5 MG: 5 TABLET, FILM COATED ORAL at 09:15

## 2018-01-01 RX ADMIN — DOCUSATE SODIUM 100 MG: 100 CAPSULE, LIQUID FILLED ORAL at 20:38

## 2018-01-01 RX ADMIN — APIXABAN 2.5 MG: 5 TABLET, FILM COATED ORAL at 22:20

## 2018-01-01 RX ADMIN — APIXABAN 2.5 MG: 5 TABLET, FILM COATED ORAL at 08:56

## 2018-01-01 RX ADMIN — METOPROLOL SUCCINATE 25 MG: 25 TABLET, FILM COATED, EXTENDED RELEASE ORAL at 08:30

## 2018-01-01 RX ADMIN — SODIUM POLYSTYRENE SULFONATE 15 G: 15 SUSPENSION ORAL; RECTAL at 10:01

## 2018-01-01 RX ADMIN — HYDRALAZINE HYDROCHLORIDE 12.5 MG: 25 TABLET, FILM COATED ORAL at 22:48

## 2018-01-01 RX ADMIN — Medication 10 ML: at 21:23

## 2018-01-01 RX ADMIN — APIXABAN 2.5 MG: 5 TABLET, FILM COATED ORAL at 22:24

## 2018-01-01 RX ADMIN — DOCUSATE SODIUM 100 MG: 100 CAPSULE, LIQUID FILLED ORAL at 09:15

## 2018-01-01 RX ADMIN — DOCUSATE SODIUM 100 MG: 100 CAPSULE, LIQUID FILLED ORAL at 20:42

## 2018-01-01 RX ADMIN — METOPROLOL SUCCINATE 25 MG: 25 TABLET, FILM COATED, EXTENDED RELEASE ORAL at 08:25

## 2018-01-01 RX ADMIN — PROPOFOL 120 MG: 10 INJECTION, EMULSION INTRAVENOUS at 12:37

## 2018-01-01 RX ADMIN — Medication 10 ML: at 13:36

## 2018-01-01 RX ADMIN — MULTIPLE VITAMINS W/ MINERALS TAB 1 TABLET: TAB at 09:27

## 2018-01-01 RX ADMIN — SODIUM CHLORIDE 300 ML: 9 INJECTION, SOLUTION INTRAVENOUS at 11:29

## 2018-01-01 RX ADMIN — BUMETANIDE 1 MG: 0.25 INJECTION INTRAMUSCULAR; INTRAVENOUS at 05:13

## 2018-01-01 RX ADMIN — AMIODARONE HYDROCHLORIDE 400 MG: 200 TABLET ORAL at 08:55

## 2018-01-01 RX ADMIN — LEVOTHYROXINE SODIUM 125 MCG: 125 TABLET ORAL at 07:04

## 2018-01-01 RX ADMIN — DOCUSATE SODIUM 100 MG: 100 CAPSULE, LIQUID FILLED ORAL at 20:35

## 2018-01-01 RX ADMIN — Medication 10 ML: at 05:05

## 2018-01-01 RX ADMIN — BUMETANIDE 2 MG: 0.25 INJECTION INTRAMUSCULAR; INTRAVENOUS at 13:30

## 2018-01-01 RX ADMIN — LORAZEPAM 0.25 MG: 0.5 TABLET ORAL at 21:57

## 2018-01-01 RX ADMIN — APIXABAN 2.5 MG: 5 TABLET, FILM COATED ORAL at 08:14

## 2018-01-01 RX ADMIN — Medication 1 TABLET: at 21:25

## 2018-01-01 RX ADMIN — LEVOTHYROXINE SODIUM 125 MCG: 125 TABLET ORAL at 06:56

## 2018-01-01 RX ADMIN — BUMETANIDE 2 MG: 0.25 INJECTION INTRAMUSCULAR; INTRAVENOUS at 20:30

## 2018-01-01 RX ADMIN — LEVOTHYROXINE SODIUM 125 MCG: 125 TABLET ORAL at 06:55

## 2018-01-01 RX ADMIN — DARBEPOETIN ALFA 60 MCG: 60 INJECTION, SOLUTION INTRAVENOUS; SUBCUTANEOUS at 13:35

## 2018-01-01 RX ADMIN — Medication 10 ML: at 10:27

## 2018-01-01 RX ADMIN — BUMETANIDE 1 MG: 0.25 INJECTION INTRAMUSCULAR; INTRAVENOUS at 08:39

## 2018-01-01 RX ADMIN — LEVOTHYROXINE SODIUM 125 MCG: 125 TABLET ORAL at 06:28

## 2018-01-01 RX ADMIN — CEFEPIME HYDROCHLORIDE 1 G: 1 INJECTION, POWDER, FOR SOLUTION INTRAMUSCULAR; INTRAVENOUS at 17:38

## 2018-01-01 RX ADMIN — LEVOTHYROXINE SODIUM 125 MCG: 125 TABLET ORAL at 06:52

## 2018-01-01 RX ADMIN — Medication 1 TABLET: at 07:58

## 2018-01-01 RX ADMIN — LEVOTHYROXINE SODIUM 125 MCG: 125 TABLET ORAL at 06:30

## 2018-01-01 RX ADMIN — MULTIPLE VITAMINS W/ MINERALS TAB 1 TABLET: TAB at 08:46

## 2018-01-01 RX ADMIN — APIXABAN 2.5 MG: 5 TABLET, FILM COATED ORAL at 20:30

## 2018-01-01 RX ADMIN — BUMETANIDE 2 MG: 0.25 INJECTION INTRAMUSCULAR; INTRAVENOUS at 09:34

## 2018-01-01 RX ADMIN — DOCUSATE SODIUM 100 MG: 100 CAPSULE, LIQUID FILLED ORAL at 09:34

## 2018-01-01 RX ADMIN — BUMETANIDE 1 MG: 0.25 INJECTION INTRAMUSCULAR; INTRAVENOUS at 21:22

## 2018-01-01 RX ADMIN — BUMETANIDE 2 MG: 0.25 INJECTION INTRAMUSCULAR; INTRAVENOUS at 09:15

## 2018-01-01 RX ADMIN — SODIUM BICARBONATE 650 MG: 650 TABLET ORAL at 09:08

## 2018-01-01 RX ADMIN — SODIUM CHLORIDE: 9 INJECTION, SOLUTION INTRAVENOUS at 19:55

## 2018-01-01 RX ADMIN — APIXABAN 2.5 MG: 5 TABLET, FILM COATED ORAL at 20:13

## 2018-01-01 RX ADMIN — HYDRALAZINE HYDROCHLORIDE 12.5 MG: 25 TABLET, FILM COATED ORAL at 17:39

## 2018-01-01 RX ADMIN — APIXABAN 2.5 MG: 5 TABLET, FILM COATED ORAL at 08:57

## 2018-01-01 RX ADMIN — Medication 1 TABLET: at 09:19

## 2018-01-01 RX ADMIN — LEVOTHYROXINE SODIUM 125 MCG: 125 TABLET ORAL at 06:21

## 2018-01-01 RX ADMIN — LIDOCAINE HYDROCHLORIDE ANHYDROUS AND DEXTROSE MONOHYDRATE 1 MG/MIN: .4; 5 INJECTION, SOLUTION INTRAVENOUS at 19:12

## 2018-01-01 RX ADMIN — ENOXAPARIN SODIUM 30 MG: 30 INJECTION SUBCUTANEOUS at 15:35

## 2018-01-01 RX ADMIN — BUMETANIDE 2 MG: 0.25 INJECTION INTRAMUSCULAR; INTRAVENOUS at 20:37

## 2018-01-01 RX ADMIN — APIXABAN 2.5 MG: 5 TABLET, FILM COATED ORAL at 09:28

## 2018-01-01 RX ADMIN — APIXABAN 2.5 MG: 5 TABLET, FILM COATED ORAL at 08:59

## 2018-01-01 RX ADMIN — MULTIPLE VITAMINS W/ MINERALS TAB 1 TABLET: TAB at 09:34

## 2018-01-01 RX ADMIN — APIXABAN 2.5 MG: 5 TABLET, FILM COATED ORAL at 09:27

## 2018-01-01 RX ADMIN — APIXABAN 2.5 MG: 5 TABLET, FILM COATED ORAL at 21:25

## 2018-01-01 RX ADMIN — POTASSIUM CHLORIDE 20 MEQ: 20 TABLET, EXTENDED RELEASE ORAL at 22:24

## 2018-01-01 RX ADMIN — BUMETANIDE 1 MG: 0.25 INJECTION INTRAMUSCULAR; INTRAVENOUS at 21:25

## 2018-01-01 RX ADMIN — DOCUSATE SODIUM 100 MG: 100 CAPSULE, LIQUID FILLED ORAL at 09:27

## 2018-01-01 RX ADMIN — HYDRALAZINE HYDROCHLORIDE 12.5 MG: 25 TABLET ORAL at 07:40

## 2018-01-01 RX ADMIN — MULTIPLE VITAMINS W/ MINERALS TAB 1 TABLET: TAB at 09:48

## 2018-01-01 RX ADMIN — LEVOTHYROXINE SODIUM 125 MCG: 125 TABLET ORAL at 06:27

## 2018-01-01 RX ADMIN — Medication 10 ML: at 20:38

## 2018-01-01 RX ADMIN — APIXABAN 2.5 MG: 5 TABLET, FILM COATED ORAL at 08:50

## 2018-01-01 RX ADMIN — FENTANYL CITRATE 25 MCG: 50 INJECTION, SOLUTION INTRAMUSCULAR; INTRAVENOUS at 09:59

## 2018-01-01 RX ADMIN — MIDODRINE HYDROCHLORIDE 2.5 MG: 2.5 TABLET ORAL at 18:00

## 2018-01-01 RX ADMIN — SODIUM BICARBONATE 650 MG: 650 TABLET ORAL at 09:05

## 2018-01-01 RX ADMIN — DOCUSATE SODIUM 100 MG: 100 CAPSULE, LIQUID FILLED ORAL at 08:37

## 2018-01-01 RX ADMIN — APIXABAN 2.5 MG: 5 TABLET, FILM COATED ORAL at 08:01

## 2018-01-01 RX ADMIN — BUMETANIDE 1 MG: 0.25 INJECTION INTRAMUSCULAR; INTRAVENOUS at 09:54

## 2018-01-01 RX ADMIN — MAGNESIUM SULFATE IN WATER 2 G: 40 INJECTION, SOLUTION INTRAVENOUS at 18:13

## 2018-01-01 RX ADMIN — PANTOPRAZOLE SODIUM 40 MG: 40 INJECTION, POWDER, FOR SOLUTION INTRAVENOUS at 09:05

## 2018-01-01 RX ADMIN — DIPHENHYDRAMINE HCL 25 MG: 25 TABLET ORAL at 18:20

## 2018-01-01 RX ADMIN — LEVOTHYROXINE SODIUM 125 MCG: 125 TABLET ORAL at 05:19

## 2018-01-01 RX ADMIN — APIXABAN 2.5 MG: 5 TABLET, FILM COATED ORAL at 09:48

## 2018-01-01 RX ADMIN — APIXABAN 2.5 MG: 5 TABLET, FILM COATED ORAL at 20:58

## 2018-01-01 RX ADMIN — BUMETANIDE 2 MG: 1 TABLET ORAL at 08:57

## 2018-01-01 RX ADMIN — Medication 10 ML: at 21:19

## 2018-01-01 RX ADMIN — DOCUSATE SODIUM 100 MG: 100 CAPSULE, LIQUID FILLED ORAL at 21:36

## 2018-01-01 RX ADMIN — MULTIPLE VITAMINS W/ MINERALS TAB 1 TABLET: TAB at 08:57

## 2018-01-01 RX ADMIN — POTASSIUM CHLORIDE 20 MEQ: 20 TABLET, EXTENDED RELEASE ORAL at 21:22

## 2018-01-01 RX ADMIN — POTASSIUM CHLORIDE 20 MEQ: 20 TABLET, EXTENDED RELEASE ORAL at 10:27

## 2018-01-01 RX ADMIN — Medication 20 ML: at 09:35

## 2018-01-01 RX ADMIN — MULTIPLE VITAMINS W/ MINERALS TAB 1 TABLET: TAB at 09:41

## 2018-01-01 RX ADMIN — DOCUSATE SODIUM 100 MG: 100 CAPSULE, LIQUID FILLED ORAL at 22:21

## 2018-01-01 RX ADMIN — LEVOTHYROXINE SODIUM 125 MCG: 125 TABLET ORAL at 06:19

## 2018-01-01 RX ADMIN — APIXABAN 2.5 MG: 5 TABLET, FILM COATED ORAL at 10:02

## 2018-01-01 RX ADMIN — APIXABAN 2.5 MG: 5 TABLET, FILM COATED ORAL at 19:52

## 2018-01-01 RX ADMIN — INSULIN HUMAN 10 UNITS: 100 INJECTION, SOLUTION PARENTERAL at 05:06

## 2018-01-01 RX ADMIN — DOCUSATE SODIUM 100 MG: 100 CAPSULE, LIQUID FILLED ORAL at 20:20

## 2018-01-01 RX ADMIN — Medication 1 TABLET: at 08:56

## 2018-01-01 RX ADMIN — BUMETANIDE 2 MG: 0.25 INJECTION INTRAMUSCULAR; INTRAVENOUS at 10:50

## 2018-01-01 RX ADMIN — APIXABAN 2.5 MG: 5 TABLET, FILM COATED ORAL at 09:34

## 2018-01-01 RX ADMIN — Medication 1 TABLET: at 20:31

## 2018-01-01 RX ADMIN — APIXABAN 2.5 MG: 5 TABLET, FILM COATED ORAL at 08:33

## 2018-01-01 RX ADMIN — SODIUM CHLORIDE 500 ML: 9 INJECTION, SOLUTION INTRAVENOUS at 10:23

## 2018-01-01 RX ADMIN — VANCOMYCIN HYDROCHLORIDE 1000 MG: 1 INJECTION, POWDER, LYOPHILIZED, FOR SOLUTION INTRAVENOUS at 21:19

## 2018-01-01 RX ADMIN — SODIUM CHLORIDE 63 ML/HR: 9 INJECTION, SOLUTION INTRAVENOUS at 13:33

## 2018-01-01 RX ADMIN — Medication 10 ML: at 20:51

## 2018-01-01 RX ADMIN — Medication 10 ML: at 09:08

## 2018-01-01 RX ADMIN — Medication 10 ML: at 13:54

## 2018-01-01 RX ADMIN — HYDRALAZINE HYDROCHLORIDE 12.5 MG: 25 TABLET, FILM COATED ORAL at 06:54

## 2018-01-01 RX ADMIN — DEXTROSE MONOHYDRATE 150 MG: 50 INJECTION, SOLUTION INTRAVENOUS at 17:52

## 2018-01-01 RX ADMIN — ACETAMINOPHEN 650 MG: 325 TABLET ORAL at 21:56

## 2018-01-01 RX ADMIN — Medication 10 ML: at 06:28

## 2018-01-01 RX ADMIN — DOBUTAMINE IN DEXTROSE 2.5 MCG/KG/MIN: 400 INJECTION, SOLUTION INTRAVENOUS at 22:59

## 2018-01-01 RX ADMIN — Medication 1 TABLET: at 09:06

## 2018-01-01 RX ADMIN — MELATONIN 3 MG ORAL TABLET 3 MG: 3 TABLET ORAL at 23:39

## 2018-01-01 RX ADMIN — Medication 10 ML: at 09:00

## 2018-01-01 RX ADMIN — BUMETANIDE 2 MG: 0.25 INJECTION INTRAMUSCULAR; INTRAVENOUS at 20:15

## 2018-01-01 RX ADMIN — MULTIPLE VITAMINS W/ MINERALS TAB 1 TABLET: TAB at 10:31

## 2018-01-01 RX ADMIN — Medication 1 TABLET: at 20:58

## 2018-01-01 RX ADMIN — BUMETANIDE 2 MG: 0.25 INJECTION INTRAMUSCULAR; INTRAVENOUS at 13:39

## 2018-01-01 RX ADMIN — BUMETANIDE 1 MG: 0.25 INJECTION INTRAMUSCULAR; INTRAVENOUS at 09:31

## 2018-01-01 RX ADMIN — BUMETANIDE 2 MG: 0.25 INJECTION INTRAMUSCULAR; INTRAVENOUS at 15:19

## 2018-01-01 RX ADMIN — LEVOTHYROXINE SODIUM 125 MCG: 125 TABLET ORAL at 06:36

## 2018-01-01 RX ADMIN — Medication 1 TABLET: at 08:51

## 2018-01-01 RX ADMIN — BUMETANIDE 0.5 MG: 0.25 INJECTION INTRAMUSCULAR; INTRAVENOUS at 20:29

## 2018-01-01 RX ADMIN — Medication 1 TABLET: at 10:35

## 2018-01-01 RX ADMIN — BUMETANIDE 2 MG: 0.25 INJECTION INTRAMUSCULAR; INTRAVENOUS at 16:36

## 2018-01-01 RX ADMIN — ACETAMINOPHEN 650 MG: 325 TABLET ORAL at 17:22

## 2018-01-01 RX ADMIN — Medication 10 ML: at 20:30

## 2018-01-01 RX ADMIN — Medication 10 ML: at 00:10

## 2018-01-01 RX ADMIN — HYDRALAZINE HYDROCHLORIDE 12.5 MG: 25 TABLET, FILM COATED ORAL at 06:17

## 2018-01-01 RX ADMIN — Medication 10 ML: at 08:30

## 2018-01-01 RX ADMIN — Medication 1 TABLET: at 09:54

## 2018-01-01 RX ADMIN — LEVOTHYROXINE SODIUM 125 MCG: 125 TABLET ORAL at 06:54

## 2018-01-01 RX ADMIN — BUMETANIDE 2 MG: 0.25 INJECTION INTRAMUSCULAR; INTRAVENOUS at 09:38

## 2018-01-01 RX ADMIN — CEFEPIME HYDROCHLORIDE 2 G: 2 INJECTION, POWDER, FOR SOLUTION INTRAVENOUS at 17:14

## 2018-01-01 RX ADMIN — DOCUSATE SODIUM 100 MG: 100 CAPSULE, LIQUID FILLED ORAL at 09:48

## 2018-01-01 RX ADMIN — DEXTROSE MONOHYDRATE 25 G: 25 INJECTION, SOLUTION INTRAVENOUS at 19:50

## 2018-01-01 RX ADMIN — MULTIPLE VITAMINS W/ MINERALS TAB 1 TABLET: TAB at 09:54

## 2018-01-01 RX ADMIN — APIXABAN 2.5 MG: 5 TABLET, FILM COATED ORAL at 08:36

## 2018-01-01 RX ADMIN — BUMETANIDE 2 MG: 0.25 INJECTION INTRAMUSCULAR; INTRAVENOUS at 20:58

## 2018-01-01 RX ADMIN — MULTIPLE VITAMINS W/ MINERALS TAB 1 TABLET: TAB at 09:00

## 2018-01-01 RX ADMIN — LEVOTHYROXINE SODIUM 125 MCG: 125 TABLET ORAL at 06:37

## 2018-01-01 RX ADMIN — LIDOCAINE HYDROCHLORIDE 100 MG: 20 INJECTION INTRAVENOUS at 18:38

## 2018-01-01 RX ADMIN — APIXABAN 2.5 MG: 5 TABLET, FILM COATED ORAL at 08:39

## 2018-01-01 RX ADMIN — SODIUM CHLORIDE 500 ML: 9 INJECTION, SOLUTION INTRAVENOUS at 18:51

## 2018-01-01 RX ADMIN — APIXABAN 2.5 MG: 5 TABLET, FILM COATED ORAL at 09:31

## 2018-01-01 RX ADMIN — POTASSIUM CHLORIDE 20 MEQ: 20 TABLET, EXTENDED RELEASE ORAL at 13:50

## 2018-01-01 RX ADMIN — DEXTROSE MONOHYDRATE 25 G: 25 INJECTION, SOLUTION INTRAVENOUS at 05:05

## 2018-01-01 RX ADMIN — Medication 10 ML: at 20:29

## 2018-01-01 RX ADMIN — MULTIPLE VITAMINS W/ MINERALS TAB 1 TABLET: TAB at 08:58

## 2018-01-01 RX ADMIN — Medication 10 ML: at 22:20

## 2018-01-01 RX ADMIN — SODIUM CHLORIDE: 9 INJECTION, SOLUTION INTRAVENOUS at 12:10

## 2018-01-01 RX ADMIN — METOPROLOL SUCCINATE 25 MG: 25 TABLET, FILM COATED, EXTENDED RELEASE ORAL at 09:41

## 2018-01-01 RX ADMIN — SODIUM CHLORIDE: 9 INJECTION, SOLUTION INTRAVENOUS at 23:43

## 2018-01-01 RX ADMIN — HYDRALAZINE HYDROCHLORIDE 12.5 MG: 25 TABLET, FILM COATED ORAL at 21:25

## 2018-01-01 RX ADMIN — MIDODRINE HYDROCHLORIDE 2.5 MG: 2.5 TABLET ORAL at 11:53

## 2018-01-01 RX ADMIN — CEFEPIME HYDROCHLORIDE 1 G: 1 INJECTION, POWDER, FOR SOLUTION INTRAMUSCULAR; INTRAVENOUS at 17:04

## 2018-01-01 RX ADMIN — APIXABAN 2.5 MG: 5 TABLET, FILM COATED ORAL at 20:05

## 2018-01-01 RX ADMIN — SODIUM CHLORIDE: 9 INJECTION, SOLUTION INTRAVENOUS at 03:48

## 2018-01-01 RX ADMIN — Medication 10 ML: at 09:27

## 2018-01-01 RX ADMIN — BUMETANIDE 2 MG: 1 TABLET ORAL at 23:18

## 2018-01-01 RX ADMIN — VANCOMYCIN HYDROCHLORIDE 1 G: 1 INJECTION, POWDER, LYOPHILIZED, FOR SOLUTION INTRAVENOUS at 09:42

## 2018-01-01 RX ADMIN — Medication 1 TABLET: at 09:07

## 2018-01-01 RX ADMIN — SODIUM BICARBONATE 50 MEQ: 84 INJECTION, SOLUTION INTRAVENOUS at 19:47

## 2018-01-01 RX ADMIN — BUMETANIDE 2 MG: 0.25 INJECTION INTRAMUSCULAR; INTRAVENOUS at 08:59

## 2018-01-01 RX ADMIN — LEVOTHYROXINE SODIUM 125 MCG: 125 TABLET ORAL at 06:31

## 2018-01-01 RX ADMIN — Medication 10 ML: at 14:05

## 2018-01-01 RX ADMIN — Medication 2000 UNITS: at 09:41

## 2018-01-01 RX ADMIN — LEVOTHYROXINE SODIUM 125 MCG: 125 TABLET ORAL at 06:45

## 2018-01-01 RX ADMIN — BUMETANIDE 0.5 MG: 0.25 INJECTION INTRAMUSCULAR; INTRAVENOUS at 08:25

## 2018-01-01 RX ADMIN — Medication 1 TABLET: at 09:43

## 2018-01-01 RX ADMIN — FENTANYL CITRATE 20 MCG: 50 INJECTION, SOLUTION INTRAMUSCULAR; INTRAVENOUS at 10:55

## 2018-01-01 RX ADMIN — Medication 1 CAPSULE: at 17:50

## 2018-01-01 RX ADMIN — DOCUSATE SODIUM 100 MG: 100 CAPSULE, LIQUID FILLED ORAL at 20:28

## 2018-01-01 RX ADMIN — APIXABAN 2.5 MG: 5 TABLET, FILM COATED ORAL at 09:04

## 2018-01-01 RX ADMIN — Medication 2000 UNITS: at 09:15

## 2018-01-01 RX ADMIN — NOREPINEPHRINE BITARTRATE 5 MCG/MIN: 1 INJECTION, SOLUTION, CONCENTRATE INTRAVENOUS at 19:00

## 2018-01-01 RX ADMIN — PETROLATUM: 42 OINTMENT TOPICAL at 23:29

## 2018-01-01 RX ADMIN — Medication 1 TABLET: at 08:52

## 2018-01-01 RX ADMIN — Medication 10 ML: at 20:35

## 2018-01-01 RX ADMIN — BUMETANIDE 2 MG: 0.25 INJECTION INTRAMUSCULAR; INTRAVENOUS at 20:34

## 2018-01-01 RX ADMIN — SODIUM POLYSTYRENE SULFONATE 15 G: 15 SUSPENSION ORAL; RECTAL at 05:05

## 2018-01-01 RX ADMIN — CALCIUM GLUCONATE 1 G: 94 INJECTION, SOLUTION INTRAVENOUS at 18:02

## 2018-01-01 RX ADMIN — BUMETANIDE 2 MG: 0.25 INJECTION INTRAMUSCULAR; INTRAVENOUS at 20:27

## 2018-01-01 RX ADMIN — Medication 1 TABLET: at 22:24

## 2018-01-01 RX ADMIN — MIDODRINE HYDROCHLORIDE 5 MG: 5 TABLET ORAL at 08:51

## 2018-01-01 RX ADMIN — Medication 10 ML: at 09:34

## 2018-01-01 RX ADMIN — MAGNESIUM HYDROXIDE 30 ML: 400 SUSPENSION ORAL at 17:41

## 2018-01-01 RX ADMIN — DOBUTAMINE HYDROCHLORIDE 2.5 MCG/KG/MIN: 400 INJECTION INTRAVENOUS at 01:31

## 2018-01-01 RX ADMIN — Medication 10 ML: at 09:16

## 2018-01-01 RX ADMIN — BUMETANIDE 2 MG: 0.25 INJECTION INTRAMUSCULAR; INTRAVENOUS at 14:23

## 2018-01-01 RX ADMIN — METOPROLOL SUCCINATE 25 MG: 25 TABLET, FILM COATED, EXTENDED RELEASE ORAL at 11:35

## 2018-01-01 RX ADMIN — BUMETANIDE 2 MG: 0.25 INJECTION INTRAMUSCULAR; INTRAVENOUS at 19:52

## 2018-01-01 RX ADMIN — CALCIUM GLUCONATE 1 G: 98 INJECTION, SOLUTION INTRAVENOUS at 20:03

## 2018-01-01 RX ADMIN — INSULIN HUMAN 10 UNITS: 100 INJECTION, SOLUTION PARENTERAL at 19:43

## 2018-01-01 RX ADMIN — ACETAMINOPHEN 650 MG: 325 TABLET ORAL at 22:18

## 2018-01-01 RX ADMIN — DOCUSATE SODIUM 100 MG: 100 CAPSULE, LIQUID FILLED ORAL at 08:15

## 2018-01-01 RX ADMIN — BUMETANIDE 2 MG: 0.25 INJECTION INTRAMUSCULAR; INTRAVENOUS at 11:10

## 2018-01-01 RX ADMIN — BUMETANIDE 2 MG: 0.25 INJECTION INTRAMUSCULAR; INTRAVENOUS at 20:42

## 2018-01-01 RX ADMIN — AMIODARONE HYDROCHLORIDE 400 MG: 200 TABLET ORAL at 20:18

## 2018-01-01 RX ADMIN — BUMETANIDE 2 MG: 1 TABLET ORAL at 21:36

## 2018-01-01 RX ADMIN — MULTIPLE VITAMINS W/ MINERALS TAB 1 TABLET: TAB at 09:15

## 2018-01-01 RX ADMIN — MULTIPLE VITAMINS W/ MINERALS TAB 1 TABLET: TAB at 09:31

## 2018-01-01 RX ADMIN — AMIODARONE HYDROCHLORIDE 400 MG: 200 TABLET ORAL at 20:58

## 2018-01-01 RX ADMIN — BUMETANIDE 2 MG: 0.25 INJECTION INTRAMUSCULAR; INTRAVENOUS at 20:35

## 2018-01-01 RX ADMIN — Medication 1 TABLET: at 09:29

## 2018-01-01 RX ADMIN — AMIODARONE HYDROCHLORIDE 400 MG: 200 TABLET ORAL at 09:41

## 2018-01-01 RX ADMIN — Medication 10 ML: at 08:52

## 2018-01-01 RX ADMIN — METOPROLOL SUCCINATE 25 MG: 25 TABLET, FILM COATED, EXTENDED RELEASE ORAL at 08:56

## 2018-01-01 RX ADMIN — CEFEPIME HYDROCHLORIDE 1 G: 1 INJECTION, POWDER, FOR SOLUTION INTRAMUSCULAR; INTRAVENOUS at 16:38

## 2018-01-01 RX ADMIN — APIXABAN 2.5 MG: 5 TABLET, FILM COATED ORAL at 20:50

## 2018-01-01 RX ADMIN — BUMETANIDE 2 MG: 0.25 INJECTION INTRAMUSCULAR; INTRAVENOUS at 10:31

## 2018-01-01 RX ADMIN — DOCUSATE SODIUM 100 MG: 100 CAPSULE, LIQUID FILLED ORAL at 12:22

## 2018-01-01 RX ADMIN — DOCUSATE SODIUM 100 MG: 100 CAPSULE, LIQUID FILLED ORAL at 09:41

## 2018-01-01 RX ADMIN — Medication 1 CAPSULE: at 17:06

## 2018-01-01 RX ADMIN — DIPHENHYDRAMINE HCL 25 MG: 25 TABLET ORAL at 07:20

## 2018-01-01 RX ADMIN — LEVOTHYROXINE SODIUM 125 MCG: 125 TABLET ORAL at 06:02

## 2018-01-01 RX ADMIN — DOCUSATE SODIUM 100 MG: 100 CAPSULE, LIQUID FILLED ORAL at 19:52

## 2018-01-01 RX ADMIN — LEVOTHYROXINE SODIUM 125 MCG: 125 TABLET ORAL at 05:24

## 2018-01-01 RX ADMIN — CALCIUM CHLORIDE 0.33 G: 100 INJECTION, SOLUTION INTRAVENOUS at 05:05

## 2018-01-01 RX ADMIN — DOCUSATE SODIUM 100 MG: 100 CAPSULE, LIQUID FILLED ORAL at 16:22

## 2018-01-01 RX ADMIN — DIPHENHYDRAMINE HCL 25 MG: 25 TABLET ORAL at 21:55

## 2018-01-01 RX ADMIN — PROPOFOL 10 MCG/KG/MIN: 10 INJECTION, EMULSION INTRAVENOUS at 10:00

## 2018-01-01 RX ADMIN — APIXABAN 2.5 MG: 5 TABLET, FILM COATED ORAL at 20:42

## 2018-01-01 RX ADMIN — BUMETANIDE 0.5 MG: 0.25 INJECTION INTRAMUSCULAR; INTRAVENOUS at 11:40

## 2018-01-01 RX ADMIN — Medication 10 ML: at 21:01

## 2018-01-01 RX ADMIN — DOCUSATE SODIUM 100 MG: 100 CAPSULE, LIQUID FILLED ORAL at 00:10

## 2018-01-01 RX ADMIN — Medication 2000 UNITS: at 09:34

## 2018-01-01 RX ADMIN — HYDRALAZINE HYDROCHLORIDE 12.5 MG: 25 TABLET ORAL at 23:02

## 2018-01-01 RX ADMIN — HYDRALAZINE HYDROCHLORIDE 12.5 MG: 25 TABLET, FILM COATED ORAL at 06:18

## 2018-01-01 RX ADMIN — AMIODARONE HYDROCHLORIDE 400 MG: 200 TABLET ORAL at 09:30

## 2018-01-01 RX ADMIN — APIXABAN 2.5 MG: 5 TABLET, FILM COATED ORAL at 19:56

## 2018-01-01 RX ADMIN — Medication 2000 UNITS: at 08:14

## 2018-01-01 RX ADMIN — MIDODRINE HYDROCHLORIDE 2.5 MG: 2.5 TABLET ORAL at 17:18

## 2018-01-01 RX ADMIN — Medication 10 ML: at 09:49

## 2018-01-01 RX ADMIN — DOCUSATE SODIUM 100 MG: 100 CAPSULE, LIQUID FILLED ORAL at 08:57

## 2018-01-01 RX ADMIN — Medication 1 TABLET: at 08:33

## 2018-01-01 RX ADMIN — APIXABAN 2.5 MG: 5 TABLET, FILM COATED ORAL at 09:19

## 2018-01-01 RX ADMIN — BUMETANIDE 2 MG: 0.25 INJECTION INTRAMUSCULAR; INTRAVENOUS at 09:03

## 2018-01-01 RX ADMIN — BUMETANIDE 1 MG: 0.25 INJECTION INTRAMUSCULAR; INTRAVENOUS at 22:25

## 2018-01-01 RX ADMIN — Medication 10 ML: at 20:42

## 2018-01-01 RX ADMIN — HYDRALAZINE HYDROCHLORIDE 12.5 MG: 25 TABLET ORAL at 06:37

## 2018-01-01 RX ADMIN — DIPHENHYDRAMINE HCL 25 MG: 25 TABLET ORAL at 03:17

## 2018-01-01 RX ADMIN — APIXABAN 2.5 MG: 5 TABLET, FILM COATED ORAL at 20:27

## 2018-01-01 RX ADMIN — SODIUM POLYSTYRENE SULFONATE 15 G: 15 SUSPENSION ORAL; RECTAL at 19:55

## 2018-01-01 RX ADMIN — POTASSIUM CHLORIDE 20 MEQ: 20 TABLET, EXTENDED RELEASE ORAL at 09:54

## 2018-01-01 ASSESSMENT — PULMONARY FUNCTION TESTS
PIF_VALUE: 1
PIF_VALUE: 0
PIF_VALUE: 0
PIF_VALUE: 1
PIF_VALUE: 0
PIF_VALUE: 1
PIF_VALUE: 0
PIF_VALUE: 0
PIF_VALUE: 1
PIF_VALUE: 0
PIF_VALUE: 1
PIF_VALUE: 0
PIF_VALUE: 1
PIF_VALUE: 0
PIF_VALUE: 1
PIF_VALUE: 0
PIF_VALUE: 1
PIF_VALUE: 0
PIF_VALUE: 1
PIF_VALUE: 0
PIF_VALUE: 0
PIF_VALUE: 1
PIF_VALUE: 0
PIF_VALUE: 1
PIF_VALUE: 1
PIF_VALUE: 0
PIF_VALUE: 1
PIF_VALUE: 0
PIF_VALUE: 1
PIF_VALUE: 0
PIF_VALUE: 1
PIF_VALUE: 1
PIF_VALUE: 0
PIF_VALUE: 1
PIF_VALUE: 0
PIF_VALUE: 1
PIF_VALUE: 0
PIF_VALUE: 1
PIF_VALUE: 0
PIF_VALUE: 1
PIF_VALUE: 0
PIF_VALUE: 1
PIF_VALUE: 1
PIF_VALUE: 0
PIF_VALUE: 1
PIF_VALUE: 0
PIF_VALUE: 1
PIF_VALUE: 2
PIF_VALUE: 1
PIF_VALUE: 1
PIF_VALUE: 0
PIF_VALUE: 1
PIF_VALUE: 0
PIF_VALUE: 1
PIF_VALUE: 1
PIF_VALUE: 0
PIF_VALUE: 1
PIF_VALUE: 1
PIF_VALUE: 0
PIF_VALUE: 1
PIF_VALUE: 0
PIF_VALUE: 0
PIF_VALUE: 1

## 2018-01-01 ASSESSMENT — PAIN SCALES - GENERAL
PAINLEVEL_OUTOF10: 0
PAINLEVEL_OUTOF10: 6
PAINLEVEL_OUTOF10: 0
PAINLEVEL_OUTOF10: 5
PAINLEVEL_OUTOF10: 0
PAINLEVEL_OUTOF10: 5
PAINLEVEL_OUTOF10: 0
PAINLEVEL_OUTOF10: 3
PAINLEVEL_OUTOF10: 0
PAINLEVEL_OUTOF10: 4
PAINLEVEL_OUTOF10: 0
PAINLEVEL_OUTOF10: 0
PAINLEVEL_OUTOF10: 5
PAINLEVEL_OUTOF10: 0
PAINLEVEL_OUTOF10: 4
PAINLEVEL_OUTOF10: 0
PAINLEVEL_OUTOF10: 4
PAINLEVEL_OUTOF10: 0
PAINLEVEL_OUTOF10: 5
PAINLEVEL_OUTOF10: 0
PAINLEVEL_OUTOF10: 1
PAINLEVEL_OUTOF10: 0
PAINLEVEL_OUTOF10: 1
PAINLEVEL_OUTOF10: 0
PAINLEVEL_OUTOF10: 6
PAINLEVEL_OUTOF10: 0
PAINLEVEL_OUTOF10: 5
PAINLEVEL_OUTOF10: 0

## 2018-01-01 ASSESSMENT — ENCOUNTER SYMPTOMS
NAUSEA: 0
VOMITING: 0
ABDOMINAL PAIN: 0
DIARRHEA: 0
SHORTNESS OF BREATH: 1
COUGH: 0
SHORTNESS OF BREATH: 0
NAUSEA: 0
WHEEZING: 0
COLOR CHANGE: 0
STRIDOR: 0
DIARRHEA: 0
VOMITING: 0
SORE THROAT: 0
SORE THROAT: 0
BACK PAIN: 0
ABDOMINAL PAIN: 0
COLOR CHANGE: 0
COUGH: 0
BACK PAIN: 0

## 2018-01-01 ASSESSMENT — PAIN - FUNCTIONAL ASSESSMENT: PAIN_FUNCTIONAL_ASSESSMENT: 0-10

## 2018-01-01 ASSESSMENT — PAIN DESCRIPTION - FREQUENCY
FREQUENCY: INTERMITTENT
FREQUENCY: CONTINUOUS
FREQUENCY: INTERMITTENT
FREQUENCY: INTERMITTENT

## 2018-01-01 ASSESSMENT — PAIN DESCRIPTION - LOCATION
LOCATION: NECK
LOCATION: HEAD;NECK
LOCATION: GENERALIZED
LOCATION: NECK
LOCATION: GENERALIZED

## 2018-01-01 ASSESSMENT — PAIN DESCRIPTION - PROGRESSION
CLINICAL_PROGRESSION: GRADUALLY WORSENING
CLINICAL_PROGRESSION: GRADUALLY WORSENING

## 2018-01-01 ASSESSMENT — PAIN DESCRIPTION - PAIN TYPE
TYPE: ACUTE PAIN

## 2018-01-01 ASSESSMENT — PAIN DESCRIPTION - DESCRIPTORS
DESCRIPTORS: ACHING;DISCOMFORT;SORE
DESCRIPTORS: ACHING;DISCOMFORT;DULL
DESCRIPTORS: ACHING;DISCOMFORT;SORE
DESCRIPTORS: SORE
DESCRIPTORS: ACHING;DISCOMFORT;SORE
DESCRIPTORS: ACHING;DISCOMFORT;SORE

## 2018-01-01 ASSESSMENT — PAIN DESCRIPTION - ORIENTATION
ORIENTATION: RIGHT;LEFT

## 2018-01-01 ASSESSMENT — PAIN DESCRIPTION - ONSET
ONSET: ON-GOING
ONSET: GRADUAL
ONSET: ON-GOING
ONSET: ON-GOING

## 2018-01-01 ASSESSMENT — EJECTION FRACTION
EF_VALUE: 35%
EF_SOURCE: 2D ECHO

## 2018-05-07 PROBLEM — D64.9 NORMOCYTIC ANEMIA: Status: ACTIVE | Noted: 2018-01-01

## 2018-05-07 PROBLEM — I50.43 ACUTE ON CHRONIC COMBINED SYSTOLIC AND DIASTOLIC CHF (CONGESTIVE HEART FAILURE) (HCC): Status: ACTIVE | Noted: 2018-01-01

## 2018-05-21 PROBLEM — I48.19 PERSISTENT ATRIAL FIBRILLATION (HCC): Status: ACTIVE | Noted: 2018-01-01

## 2018-06-07 PROBLEM — I48.91 ATRIAL FIBRILLATION WITH RVR (HCC): Status: ACTIVE | Noted: 2018-01-01

## 2018-06-07 PROBLEM — D64.9 NORMOCYTIC ANEMIA: Status: RESOLVED | Noted: 2018-01-01 | Resolved: 2018-01-01

## 2018-06-14 PROBLEM — I50.9 CONGESTIVE HEART FAILURE DUE TO VALVULAR DISEASE (HCC): Status: ACTIVE | Noted: 2018-01-01

## 2018-06-14 PROBLEM — I49.5 SINUS NODE DYSFUNCTION (HCC): Status: ACTIVE | Noted: 2018-01-01

## 2018-06-14 PROBLEM — I44.7 LBBB (LEFT BUNDLE BRANCH BLOCK): Status: ACTIVE | Noted: 2018-01-01

## 2018-06-14 PROBLEM — R57.0 CARDIOGENIC SHOCK (HCC): Status: ACTIVE | Noted: 2018-01-01

## 2018-06-14 PROBLEM — I38 CONGESTIVE HEART FAILURE DUE TO VALVULAR DISEASE (HCC): Status: ACTIVE | Noted: 2018-01-01

## 2018-06-14 NOTE — PROGRESS NOTES
Dr. Bobby Mary on call for EP; notified of consult via perfect serve. Dr. Bobby Mary returned call; aware of consult. States he will place orders.

## 2018-06-15 NOTE — CONSULTS
CTS Consult    Patient name: Brett Madrigal    Reason for consult: MR    Primary Care Physician: Sandy Schmidt MD    Date of service: 6/15/2018    Chief Complaint: BARAHONA    HPI: 80year old with known severe MR who was transferred here for CT surgical evaluation. She has been admitted since June 7 after being hospitalized in May too. At that time she had a PRISCILLA which showed the severe MR and severe LV dysfunction. Previous cath was clean. She reports continued decline in functional capacity and BARAHONA and SOB. She doesn't overall feel better even at this time.     Allergies: No Known Allergies    Home medications:    Current Facility-Administered Medications   Medication Dose Route Frequency Provider Last Rate Last Dose    glucose (GLUTOSE) 40 % oral gel 15 g  15 g Oral PRN Valencia Vela MD        dextrose 50 % solution 12.5 g  12.5 g Intravenous PRN Valencia Vela MD        glucagon (rDNA) injection 1 mg  1 mg Intramuscular PRN Valencia Vela MD        dextrose 5 % solution  100 mL/hr Intravenous PRN Valencia Vela MD        [facility]) injection 2 mg  2 mg Intravenous BID Marshall Escobar MD   2 mg at 06/15/18 1050    hydrALAZINE (APRESOLINE) tablet 12.5 mg  12.5 mg Oral 3 times per day Marshall Escobar MD        perflutren lipid microspheres (DEFINITY) injection 1.65 mg  1.5 mL Intravenous ONCE PRN CHACHO Wilkerson - CNP        [START ON 6/18/2018] vitamin D (CHOLECALCIFEROL) tablet 1,000 Units  1,000 Units Oral Once per day on Gerald Arevalo MD        docusate sodium (COLACE) capsule 100 mg  100 mg Oral BID PRN Sandy Schmidt MD        ferrous fumarate-vitamin c (SILKE-SEQUELS) 65-25 MG CR tablet 1 tablet  1 tablet Oral BID Sandy Schmidt MD   1 tablet at 06/14/18 2124    levothyroxine (SYNTHROID) tablet 125 mcg  125 mcg Oral Daily Sandy Schmidt MD   125 mcg at 06/15/18 8941    therapeutic multivitamin-minerals 1 tablet  1 tablet Oral QAM Sandy Schmidt MD        acetaminophen (TYLENOL) tablet 650 mg  650 mg Oral Q4H PRN Isabel Sierra MD           Past Medical History:  Past Medical History:   Diagnosis Date    A-fib [facility])     follows with Dr Mateo Gaviria CHF (congestive heart failure) ([facility]     Chronic systolic congestive heart failure ([facility] 1/24/2017     Updating Deprecated Diagnoses    CKD (chronic kidney disease) stage 4, GFR 15-29 ml/min ([facility]     Follows with Dr Jordan Newman Hypertension     Hypothyroidism     Uses walker        Past Surgical History:  Past Surgical History:   Procedure Laterality Date   [place] Le[place]  4/14/14    by Dr. Mo Pickard, LAPAROSCOPIC  4/11/14    OTHER SURGICAL HISTORY  12/15/2016    cardioversion; pt denies    TONSILLECTOMY         Social History:  Social History     Social History    Marital status: Single     Spouse name: N/A    Number of children: N/A    Years of education: N/A     Occupational History    Not on file. Social History Main Topics    Smoking status: Never Smoker    Smokeless tobacco: Never Used    Alcohol use Yes      Comment: OCCAS GLASS WINE    Drug use: No    Sexual activity: No     Other Topics Concern    Not on file     Social History Narrative    No narrative on file       Family History:  Family History   Problem Relation Age of Onset    Heart Failure Mother     Heart Disease Father     Heart Attack Father     Heart Disease Brother     Heart Surgery Brother     Pacemaker Sister        Review of Systems:  Constitutional: Denies fevers, chills, or weight loss. HEENT: Denies visual changes or hearing loss. Heart: As per HPI. Lungs: Denies shortness of breath, cough, or wheezing. Gastrointestinal: Denies nausea, vomiting, constipation, or diarrhea. Genitourinary: dysuria or hematuria. Psychiatric: Patient denies anxiety or depression.    Neurologic: Patient denies weakness of the extremities, dizziness, or headaches. All other ROS checked and found to be negative. Objective:  Vitals /64   Pulse 52   Temp 97.8 °F (36.6 °C) (Temporal)   Resp 16   SpO2 100%   General Appearance: Pleasant 80y.o. year old female who appears stated age. Communicates well, no acute distress. HEENT: Head is normocephalic, atraumatic. EOMs intact, PERRL. Trachea midline. Lungs: Normal respiratory rate and normal effort. She is not in respiratory distress. Breath sounds clear to auscultation. No wheezes. Heart: Normal rate. Regular rhythm. S1 normal and S2 normal. Positive for murmur. Chest: Symmetric chest wall expansion. Extremities: Normal range of motion. Neurological: Patient is alert and oriented to person, place and time. Patient has normal reflexes. Skin: Warm and dry. Abdomen: Abdomen is soft and non-distended. Bowel sounds are normal. There is no abdominal tenderness tenderness. There is no guarding. There is no mass. Pulses: Distal pulses are intact. Skin: Warm and dry without lesions. Assessment: Severe MR, Severe LV dysfunction        Plan: I think she should be evaluated for MitraClip and we have been sending patients like her to Dr. Lucila Kim at [facility] in [place]. I do not think she should have her chest opened. This could happen before or after CRT-D in my opinion.   Thank you      Electronically signed by Amy Harden MD on 6/15/2018 at 12:46 PM

## 2018-06-15 NOTE — PROGRESS NOTES
Cardiac Electrophysiology Inpatient Progress Note    Richard Gleason  9/4/1933  Date of Service: 6/15/2018  PCP: Francisco Gould MD  Cardiologist: Sharif Banerjee MD  Electrophysiologist: Eugenio Montaño MD        Subjective: Richard Gleason is seen in hospital follow-up and management of: persistent AF, acute on chronic systolic HF, LBBB, severe MR, bradycardia. She was transferred from the [address] [place] [address] yesterday for CTS evaluation of severe MR. Due to bradycardia her BB was held. She was on amiodarone for persistent AF which was also held due to acute kidney injury. LFTs are elevated and trending down. Serum cr 3.1 with CrCl 14.6 mL/min (IBW). She is on Eliquis 2.5 mg BID. Dr Go Mcdermott discussed CRT-P vs CRT-D based on SB, LBBB with QRSd >150 ms and known EF <35% with NYHA III symptoms. We discussed code status. She does not want intubation but is agreeable to medications, CPR/compression and defibrillation. Ms Moise Moura feels her breathing has improved since IVFs were discontinued. She has +3 THERESA, cough with diminished breath sounds and crackles throughout bases. O2 NC 2L,  Before making any decisions regarding device implantation/surgery she would like to discuss options for treatment of her valvular heart disease with CTS as well as her family. BNP >70,000; K+ 5.8; BUN 74; ser cr 3.1; ; ; H & H 10.4 & 33.4. Weight: 6/8/18 178 lb & 6/14/18 198 lb. She denies angina, lightheaded and dizziness. (+) orthopnea and PND. Did not tolerate being flat for an extended period.  On telemetry she remains in SB with HRs 43-54 bpm.     Patient Active Problem List   Diagnosis    Dilated cardiomyopathy ([facility]    Essential hypertension    Severe mitral regurgitation    Hypothyroidism    CKD (chronic kidney disease) stage 4, GFR 15-29 ml/min ([facility])    Acute on chronic combined systolic and diastolic CHF (congestive heart failure) ([facility]    PAF (paroxysmal atrial fibrillation) ([facility])    Stage 4 chronic kidney disease ([facility]    Chronic anemia    Pleural effusion, bilateral    Persistent atrial fibrillation (HCC)    Atrial fibrillation with rapid ventricular response (HCC)    Congestive heart failure due to valvular disease ([facility]    Cardiogenic shock (HCC)    Sinus node dysfunction (HCC)    LBBB (left bundle branch block)         Scheduled Medications:   [START ON 6/18/2018] vitamin D  1,000 Units Oral Once per day on Mon    ferrous fumarate-vitamin c  1 tablet Oral BID    levothyroxine  125 mcg Oral Daily    therapeutic multivitamin-minerals  1 tablet Oral QAM       PRN Medications:  docusate sodium, acetaminophen    No Known Allergies    Wt Readings from Last 3 Encounters:   06/14/18 198 lb (89.8 kg)   05/29/18 170 lb (77.1 kg)   05/25/18 172 lb 9.6 oz (78.3 kg)     Temp Readings from Last 3 Encounters:   06/14/18 96.7 °F (35.9 °C) (Tympanic)   06/14/18 97.4 °F (36.3 °C) (Oral)   05/29/18 97.8 °F (36.6 °C) (Temporal)     BP Readings from Last 3 Encounters:   06/14/18 112/64   06/14/18 (!) 95/58   05/29/18 (!) 102/54     Pulse Readings from Last 3 Encounters:   06/14/18 56   06/14/18 (!) 46   05/29/18 56         Intake/Output Summary (Last 24 hours) at 06/15/18 0848  Last data filed at 06/14/18 2300   Gross per 24 hour   Intake              360 ml   Output              200 ml   Net              160 ml       ROS:   Constitutional: Negative for fever, + activity change and appetite change. HENT: Negative for epistaxis, difficulty swallowing. Eyes: Negative for blurred vision or double vision. Respiratory: +for cough, shortness of breath and wheezing. Cardiovascular: Negative for chest pain, palpitations and + leg swelling. Gastrointestinal: Negative for abdominal pain, heartburn, or blood in stool. Genitourinary: Negative for hematuria, burning or frequency. Musculoskeletal: Negative for myalgias, stiffness, or swelling. Skin: Negative for rash, pain, or lumps.    Neurological: Negative for syncope, seizures, or headaches. Psychiatric/Behavioral: Negative for confusion and agitation. The patient is not nervous/anxious. PHYSICAL EXAM:  Vitals:    06/14/18 1530 06/14/18 2100   BP: (!) 101/56 112/64   Pulse: (!) 47 56   Resp: 16 18   Temp: 97.8 °F (36.6 °C) 96.7 °F (35.9 °C)   TempSrc: Temporal Tympanic   SpO2: 100% 97%     Constitutional: Oriented to person, place, and time. Well-developed and cooperative. Head: Normocephalic and atraumatic. Eyes: Conjunctivae are normal.    Neck: JVP at 10 cm. Cardiovascular: S1 normal, S2 normal and intact distal pulses. A bradycardic regular rhythm present. Pulmonary/Chest: Effort normal and breath sounds diminished throughout with crackles at the bases. Abdominal: Soft. Normal appearance. No tenderness. Musculoskeletal: Normal range of motion of all extremities, no muscle weakness. Neurological: Alert and oriented to person, place, and time. Skin: Skin is warm and dry. No bruising, no ecchymosis and no rash noted. Extremity: No clubbing or cyanosis. +3 THERESA    Psychiatric: Normal mood and affect. Thought content normal.     Pertinent Labs:  CBC:   Recent Labs      06/13/18   0930  06/14/18   0252  06/15/18   0722   WBC  5.3  5.0  4.5   HGB  10.2*  10.6*  10.4*   HCT  31.8*  33.2*  33.4*   PLT  150  138  131     BMP:  Recent Labs      06/13/18   0930  06/14/18   0252  06/15/18   0722   NA  133  132  139   K  4.9  5.3*  5.8*   CL  96*  96*  101   CO2  21*  20*  22   BUN  72*  74*  74*   CREATININE  2.7*  3.1*  3.1*   CALCIUM  9.1  9.1  8.9     ABGs: No results found for: PHART, PO2ART, VIF0ECA  INR: No results for input(s): INR in the last 72 hours. BNP: No results for input(s): BNP in the last 72 hours. TSH:   Lab Results   Component Value Date    TSH 3.440 05/08/2018      Cardiac Injury Profile: No results for input(s): CKTOTAL, CKMB, CKMBINDEX, TROPONINI in the last 72 hours.    Lipid Profile: Lab Results   Component Value Date    TRIG 56 04/05/2016 HDL 53 2016    LDLCALC 74 2016    CHOL 138 2016      Hemoglobin A1C: No components found for: HGBA1C   Xray: No results found. Pertinent Cardiac Testing:     Last CXR: 2018  Patient MRN:  [id number]   : 1933   Age: 80 years   Gender: Female       EXAM: XR CHEST PORTABLE       INDICATION: Atrial fibrillation       COMPARISON: 2018       FINDINGS:   Single frontal view. Moderate cardiomegaly. Mild pulmonary vascular   congestion. Moderate right basilar airspace disease. Mild left basilar   airspace disease favor atelectasis. Mild-to-moderate pulmonary   edema/vascular congestion. Small pleural effusions.           Impression       1. Moderate cardiomegaly with mild to moderate pulmonary vascular   congestion and pulmonary edema. 2. Bibasilar airspace disease most confluent within the right lung   base.    3. Small pleural effusions         EKG: sinus teresa with PACs and junctional escape raet 34, , , LBBB see scan in Cardiology     Last Echo: 2017        Rajat Moya MD 2017     Narrative       Transthoracic Echocardiography Report (TTE)     Demographics      Patient Name       BIBIANA CROCKETT Gender              Female                      P      Medical Record     [id number]          Room Number         RME   Number      Account #         [de-identified]        Procedure Date      2017      Corporate ID                         Ordering Physician Manuel Duque MD      Accession Number   [id number]         Referring Physician Lauren Burgos MD      Date of Birth      1933        Sonographer         [facility]      Age                83 year(s)        Interpreting       Manuel Duque MD                                        Physician                                           Any Other     Procedure    Type of Study      TTE procedure:Echo Complete W/ Dop & Color Flow.     Procedure Date  Date: 2017 Start: 07:55 AM    Study Location: Echo Moderate sedation      Findings      Left Ventricle   Ejection fraction is visually estimated at 30%.     Left Atrium   No evidence of thrombus within left atrium.      Right Atrium   No evidence of thrombus or mass in the right atrium.      Mitral Valve   Severe mitral regurgitation is present.      Tricuspid Valve   Mild to moderate tricuspid regurgitation.      Aortic Valve   The aortic valve is trileaflet.   Aortic valve opens well.   Mild aortic regurgitation is noted.      Pulmonic Valve   Pulmonic valve 1.0x1.0 mass - similar to 12/15/16.      Pericardial Effusion   There is no pericardial effusion.      Aorta   Mild plaque.   Miscellaneous   Inferior Vena Cava not well visualized.      Conclusions      Summary   No evidence of thrombus within left atrium.   Severe mitral regurgitation is present.   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      Signature      ----------------------------------------------------------------   Electronically signed by Leelee Nunn MD(Interpreting physician)   on 05/29/2018 08:10 PM            Last Cath: 5/19/2014  Cath Lab Report [id number] 4/14/2014  1:54 PM Carson Chong MD   Signed by Carson Chong MD on 05/19/14 at [address] [place] [address]                                [id number]      PROCEDURE DATE:         INDICATION:  Elevated CPK/MB fraction following a cholecystectomy consistent   with non-STEMI.      PROCEDURE:  (1) Left heart catheterization.  (2) Left ventriculography.  (3)   Coronary angiography.   (4) Selective right common femoral arteriogram.      PROCEDURAL METHOD:  The patient was brought into the cardiac cath lab.  The   procedure was explained and the usual oral and written consents were   obtained.  The right and left groin were prepped and draped in the usual   sterile fashion.  The skin over the right femoral artery was anesthetized   with 1% Xylocaine.  A 5-Divehi femoral sheath was introduced via modified   Seldinger technique aspirin.   2.    Check BNP in 1 week.   3.    Patient will follow up with me within 3 months as an outpatient.               Dictated by: Christine Mosley M.D. Telemetry6/15/2018: SB, HRs 43-54  bpm      I have independently reviewed all of the ECGs and rhythm strips per above. I have personally reviewed the laboratory, cardiac diagnostic and radiographic testing as outlined above. I have reviewed previous records noted in [address] [facility]. Impression:    1. Acute on chronic systolic CHF  - clinical scenario appears to be possible cardiogenic shock (acute on chronic kidney injury, acute hepatic injury, hypotension)  - multiple potential competing causes  - now in sinus bradycardia HRs 43-54 bpm,  with rates in 30s 6/13/18 per notes  - holding BB currently  - severe MR, EF has been depressed since at least 12/2016 echo  - Dr Yun Diane d/w Dr. Wilburn Barrier given concerns for multiple issues including CHF + MR + AF regarding consideration of inpatient consideration of valve surgery 1st vs. CRT P vs. CRT-D implant + any further attempts at medical optimization  - the patient would like to discuss options regarding valvular surgery with CTS and her family before making any decisions regarding surgery.      2. LBBB  - based on 2012 CRT guideline update patient has Class I indication for CRT-D  - sinus bradycardia, LBBB with QRS >150ms, EF <=35% and NYHA III symptoms  - Dr Yun Diane discussed CRT-P vs CRT-D, she was unsure of her code status. Today she states no intubation only, agreeable to CPR/compressions/defibrillation. She will discuss code status and device implant with her family before making any decisions. She also wants to wait until her discussion with CTS.    - she will decide upon this and let us know     3. Persistent AF  - Hold amio given acute hepatic injury  - given acute conversion of AF would try not to interrupt [address] if at all possible for 30d for non-emergent therapy  - CHADSVASC= 6  - on Eliquis 2.5mg BID based on age + renal function     4. Severe MR  - see #1  - Dr Gonzalo Higgins d/w Dr. Isabel Cuadra,  Transferred to [facility] from [facility] for [facility] evaluation     5. Acute on chronic renal insufficiency  - Estimated Creatinine Clearance: 14 mL/min  (based on SCr of 3.1 mg/dL). - per nephrology  -  IVF have been discontinued     6. Acute hepatic injury  - Amio stopped  - concern for cardiogenic shock also contributing  - LFTs elevated but stabilizing     7. Pulmonic valve mass  - chronic  - defer to cardiology/CTS    Recommendations:    1. In regards to code status, she states no intubation but is agreeable to medications, CPR/compressions, and defibrillation. 2. From a device perspective she would be agreeable to implantation, when clinically stable, but will discuss CRT-P vs CRT-D with her family before making any final decisions. The timing will depend on the decision regarding MV surgery. If MV surgery/Clip is to be considered or indicated, then we would hold on device implantation to after the procedure most likely. If no plan for MV surgery, then device implantation timing will be based on her clinical course. 3. Amiodarone on hold due to elevated LFTs. She remains in sinus rhythm. 4. Continue to hold BB for bradycardia. Will plan to resume following device implantation. 5. HF management per Dr. Ulysses Oddi. 6. CTS consult pending. Thank you for allowing me to participate in their care. Isaura Weaver, MSN, APRN-BC: above discussed with Dr. Mark Colon. [facility]    Attending Physician's Statement    The above documentation has been prepared under my direction and personally reviewed by me in its entirety. I confirm that the note above reflects all work, treatment, procedures, and medical decision making performed by me. I performed a complete CV examination including: Cardiac: Reg, teresa.  Lungs: + rales. Extremity: + 3 edema. I have spent a total of 35 minutes with the patient and her family reviewing the above stated recommendations. A total of >50% of that time involved face-to-face time providing counseling and or coordination of care with the other providers.     Renee Castaneda DO  [facility] Cardiac Electrophysiology  [facility]

## 2018-06-15 NOTE — H&P
Sandy Schmidt MD FACP   History and Physical      CHIEF COMPLAINT:  chf    hpi  80YEAR-OLD WOMAN SEEN ON imc  [facility]  Patient with complicated medical history  Transferred from [facility]  Admitted with CHF persistent A. fib worsening kidney functions  Became bradycardic and hypotensive  Transferred over to [address] for pacemaker insertion  Feels fairly well this morning  Data reviewed in detail  Spoke with the admitting doctor    Past Medical History:    Past Medical History:   Diagnosis Date    A-fib [facility])     follows with Dr Kimi Kessler CHF (congestive heart failure) [facility]     Chronic systolic congestive heart failure ([facility] 1/24/2017     Updating Deprecated Diagnoses    CKD (chronic kidney disease) stage 4, GFR 15-29 ml/min ([facility]     Follows with Dr Meg Bourgeois Hypertension     Hypothyroidism     Uses walker        Past Surgical History:    Past Surgical History:   Procedure Laterality Date   Lazarus Junekyra Bceerra 22  4/14/14    by Dr. Jose Bae, LAPAROSCOPIC  4/11/14    OTHER SURGICAL HISTORY  12/15/2016    cardioversion; pt denies    TONSILLECTOMY         Medications Prior to Admission:    Prescriptions Prior to Admission: Cholecalciferol (VITAMIN D3) 97362 units CAPS, Take 1 capsule by mouth every 7 days Mondays  Ferrous Fumarate-Vitamin C (SILKE-SEQUELS PO), Take 1 tablet by mouth 2 times daily Ld 05/23  apixaban (ELIQUIS) 2.5 MG TABS tablet, Take 1 tablet by mouth 2 times daily  docusate sodium (COLACE) 100 MG capsule, Take 100 mg by mouth 2 times daily as needed for Constipation   Multiple Vitamins-Minerals (CENTRUM SILVER ULTRA WOMENS) TABS, Take 1 tablet by mouth every morning LD 05/23  Multiple Vitamins-Minerals (ICAPS AREDS 2) CAPS, Take 1 capsule by mouth 2 times daily 05/23  torsemide (DEMADEX) 20 MG tablet, Take 2 tablets by mouth daily (Patient taking differently: Take 20 mg by mouth every morning )  levothyroxine (SYNTHROID) 125 MCG tablet, Take 1 tablet by mouth Daily (Patient taking differently: Take 125 mcg by mouth every morning (before breakfast) )    Allergies:    Patient has no known allergies. Social History:    reports that she has never smoked. She has never used smokeless tobacco. She reports that she drinks alcohol. She reports that she does not use drugs. Family History:   family history includes Heart Attack in her father; Heart Disease in her brother and father; Heart Failure in her mother; Heart Surgery in her brother; Pacemaker in her sister. REVIEW OF SYSTEMS:  As above in the HPI, otherwise negative    PHYSICAL EXAM:    Vitals:  /64   Pulse 52   Temp 97.8 °F (36.6 °C) (Temporal)   Resp 16   SpO2 100%     General:  Awake, alert, oriented X 3. Well developed, well nourished, well groomed. mILD RESPIRATORY DISTRESS    HEENT:  Normocephalic, atraumatic. Pupils equal, round, reactive to light. No scleral icterus. No conjunctival injectioN  No nasal discharge. Neck:  Supple  Heart:  Irregular and slow  Holosystolic murmur at the apex unchanged  Lungs:  CTA bilaterally, bilat symmetrical expansion, no wheeze, rales, or rhonchi  Abdomen:   Bowel sounds present, soft, nontender, no masses, no organomegaly, no peritoneal signs  Extremities:  No clubbing, cyanosis, and extensive edema noted  Skin:  Warm and dry, no open lesions or rash  Neuro:  Cranial nerves 2-12 intact, no focal deficits  Breast: deferred  Rectal: deferred  Genitalia:  deferred    LABS:  Data reviewed in detail      ASSESSMENT:    Persistent CHF  Atrial fibrillation with slow response  Chronic kidney disease  Severe MR  Multiple comorbidities      PLAN:  Poor prognosis  CT surgery consult EP consult  Pacemaker placement  Continue current medical management  Discussed in detail with the patient    Mary Blount  3:00 PM  6/15/2018

## 2018-06-15 NOTE — PROGRESS NOTES
management. Feeling \"about the same. \"  Lower swelling persists. Still dyspneic with even minimal exertion. No dyspnea at rest.  No cough or wheeze or chest pain or palpitations. Heart rate still in the 50s for the most part though better than 2 days ago. IV fluid stopped. Low-dose hydralazine started. Physical Exam:   VITALS:  BP (!) 94/54   Pulse 52   Temp 97.8 °F (36.6 °C) (Temporal)   Resp 16   SpO2 100%   24HR INTAKE/OUTPUT:      Intake/Output Summary (Last 24 hours) at 06/15/18 1644  Last data filed at 06/15/18 1432   Gross per 24 hour   Intake              720 ml   Output             1050 ml   Net             -330 ml     Gen. : Age-appropriate white woman in no apparent distress  HEENT: NC/AT EOMI PERRLA sclera and conjunctiva are clear and and icteric mucous membranes are moist  Neck: Soft and supple no lymphadenopathy no thyromegaly, and no carotid bruit  Lungs: Bilateral basilar crackles  Heart: Irregularly irregular rhythm, bradycardic.  2-3/6 HSM at base  Abdomen: Soft nontender nondistended normoactive bowel sounds  Extremities: edema 2+   Neuro: Awake alert appropriate moves all 4 extremities cranial nerves II through XII are grossly intact  Integument: No rash or lesion on    DATA:    CBC:   Lab Results   Component Value Date    WBC 4.5 06/15/2018    RBC 3.30 06/15/2018    HGB 10.4 06/15/2018    HCT 33.4 06/15/2018    .2 06/15/2018    MCH 31.5 06/15/2018    MCHC 31.1 06/15/2018    RDW 15.6 06/15/2018     06/15/2018    MPV 12.0 06/15/2018     CMP:    Lab Results   Component Value Date     06/15/2018    K 5.8 06/15/2018    K 5.3 06/14/2018     06/15/2018    CO2 22 06/15/2018    BUN 74 06/15/2018    CREATININE 3.1 06/15/2018    GFRAA 17 06/15/2018    LABGLOM 14 06/15/2018    GLUCOSE 90 06/15/2018    PROT 6.4 06/15/2018    CALCIUM 8.9 06/15/2018    BILITOT 0.6 06/15/2018    ALKPHOS 77 06/15/2018     06/15/2018     06/15/2018     BMP:    Lab Results despite the elevated proBNP. Random urine protein:creatinine ratio 0.2. 2. chronic kidney disease baseline creatinine 1.8-2.0 mg/dL secondary to renal microvascular atherosclerotic disease in setting of known coronary and peripheral arterial disease  3. Anemia secondary to CKD, as well as phlebotomy associated with recent hospitalization  4. AF with RVR. Rate controlled. Apparently flipping in and out of AF  5. Edema, chronic, multi-factorial -- dilated CM, severe MR, P HTN, AFib  6. Hypotension       - Edema about the same. Pulmonary edema stable     - Recurrent shiela -- Hypotension coupled with the hemodynamic effect of the mitral valve regurgitation with subsequent bradycardia causing reduced renal blood flow are the culprits.    - Urine studies reflect prerenal azotemia, despite considerable increased total body water;  effectively, this is cardiorenal syndrome.  - With improved heart rate, improved blood pressure, & expansion of intravascular volume azotemia has leveled off. IV fluid stopped. - Hyperkalemia -- decr'd ecv may be the culprit    Difficult situation. .. I discussed with her at length yesterday that if current scenario worsens, she may need dialysis, and even if she does not strictly \"need\" it, she may benefit from the opportunity to Curtis" her marked fluid overload with either SCUF or SLEDD. The recurrent SHIELA (after dropping back to baseline cr 1.7 after presenting w/ cr 2.5 mg/dL) was more likely due to bradycardia and hypotension than to the IV Bumex that she was administered    Acute hemodialysis not warranted at this point  IV bumex  if azotemia worsens, hyperkalemia become refractory, consider RRT    Q: Benefit from transfer to a center that could do the mitral valve clip? See Dr Lillian Christensen note.     Keep lower extremities elevated when not using them  Follow labs, UO  Low na diet      Josefina León MD 6/15/2018

## 2018-06-15 NOTE — CONSULTS
worsening the past several days. She denies exertional lightheadedness,  palpitations, syncope or near syncope. Review of systems is negative for chest pain, pressure, discomfort. When ambulating on an incline, he/she reports/denies leg claudication. History is negative for neurological symptoms including transient loss of vision, asymmetric weakness, aphasia, dysphasia, numbness, tingling. Past medical, surgical, family and social histories have been reviewed. Any changes in the past medical history, social history or family history have been made and are reflected in the electronic medical record. Patient Active Problem List    Diagnosis Date Noted    Congestive heart failure due to valvular disease ([facility] 06/14/2018    Cardiogenic shock ([facility] 06/14/2018    Sinus node dysfunction (HCC) 06/14/2018    LBBB (left bundle branch block) 06/14/2018    Atrial fibrillation with rapid ventricular response ([facility] 06/07/2018    Persistent atrial fibrillation ([facility] 05/21/2018    [facility]parox[facility]     Stage 4 chronic kidney disease ([facility])     Chronic anemia     Pleural effusion, bilateral     Hypothyroidism     CKD (chronic kidney disease) stage 4, GFR 15-29 ml/min ([facility])     Acute on chronic combined systolic and diastolic CHF (congestive heart failure) ([facility])     Severe mitral regurgitation     Dilated cardiomyopathy ([facility]     Essential hypertension        Past Medical/Surgical History:   1. Nonischemic cardiomyopathy / Chronic HFrEF  · Echo (4/2014) EF 50-55%, mild LVH, stage I DD, LA severely dilated, trace MR, mild-mod TR, mild PH  · Cardiac Catheterization (4/14/2014, Dr Torrie Hawkins) due to elevated CE (elevated CPK/CKMB with normal troponin's) after cholecystectomy>>Normal LV size and function EF 55%. Normal coronaries with some luminal irregularities. LVEDP 20 mmHg.   · Newly diagnosed cardiomyopathy 12/2016    · Elevated troponin>>12/2016 troponin 0.07/0.07 (Bun/Cr femoral pulses 2+/4 and equal bilaterally. ABDOMEN: Normal shape. No and Laparoscopic scar(s) present. Normal bowel sounds. No bruits. soft, nondistended, no masses or organomegaly. no evidence of hernia. Percussion: Normal without hepatosplenomegally. Tenderness: absent. RECTAL: deferred, not clinically indicated  NEUROLOGIC: There are no focalizing motor or sensory deficits. CN II-XII are grossly intact. Rj Lima EXTREMITIES: no cyanosis, no clubbing. +2-3 bilateral pitting edema. All the following diagnostics were personally reviewed and interpreted by me.        LAB DATA:     6/11/2018 02:05 6/12/2018 06:16 6/13/2018 09:30 6/14/2018 02:52   Sodium 134 138 133 132   Potassium 4.6 5.0 4.9 5.3 (H)   Chloride 100 100 96 (L) 96 (L)   CO2 19 (L) 21 (L) 21 (L) 20 (L)   BUN 57 (H) 64 (H) 72 (H) 74 (H)   Creatinine 1.7 (H) 2.2 (H) 2.7 (H) 3.1 (H)   Anion Gap 15 17 (H) 16 16   GFR Non- 29 21 17 14   GFR  35 26 20 17   Magnesium  2.6     Glucose 121 (H) 103 140 (H) 91   Calcium 9.0 9.1 9.1 9.1   Phosphorus    4.6 (H)   Total Protein 7.7 7.1 7.0 7.0        1/10/2017 11:43 5/7/2018 12:15 6/7/2018 09:54   Pro-BNP 63,570 (H) 43,110 (H) >70,000 (H)        6/11/2018 02:05 6/12/2018 06:16 6/13/2018 09:30 6/14/2018 02:52   Albumin 3.5 3.5 3.6 3.4 (L)   Alk Phos 61 61 68 78   ALT 34 (H) 44 (H) 587 (H) 549 (H)   AST 42 (H) 49 (H) 733 (H) 479 (H)   Bilirubin 0.6 0.7 0.7 0.6   Total Protein 7.7 7.1 7.0 7.0        6/11/2018 02:05 6/12/2018 06:16 6/13/2018 09:30 6/14/2018 02:52   WBC 5.2 4.5 5.3 5.0   RBC 3.46 (L) 3.28 (L) 3.21 (L) 3.26 (L)   Hemoglobin Quant 10.9 (L) 10.4 (L) 10.2 (L) 10.6 (L)   Hematocrit 35.3 32.8 (L) 31.8 (L) 33.2 (L)   .0 (H) 100.0 (H) 99.1 101.8 (H)   MCH 31.5 31.7 31.8 32.5   MCHC 30.9 (L) 31.7 (L) 32.1 31.9 (L)   MPV 11.9 12.3 (H) 12.5 (H) 12.5 (H)   RDW 14.8 15.0 15.4 (H) 15.5 (H)   Platelet Count 589 104 150 138        5/21/2018 10:36   Ferritin 304   Iron 50   Iron Saturation 21   TIBC 234 (L)         IMAGING:    CXR (6/7/2018)  Single frontal view. Moderate cardiomegaly. Mild pulmonary vascular congestion. Moderate right basilar airspace disease. Mild left basilar airspace disease favor atelectasis. Mild-to-moderate pulmonary edema/vascular congestion. Small pleural effusions. Impression:  1. Moderate cardiomegaly with mild to moderate pulmonary vascular congestion and pulmonary edema. 2. Bibasilar airspace disease most confluent within the right lung base. 3. Small pleural effusions          CARDIAC TESTING:      TTE(4/24/2017, Dr. [facility])  Left Ventricle      Ejection fraction is visually estimated at 35%.     Left ventricle is severely enlarged .      Global hypokinesis.      Normal left ventricular diastolic filling pattern for age. Right Ventricle      Normal right ventricular size and function. Left Atrium      The left atrium is moderate-severely dilated. Right Atrium      Normal right atrium size. Mitral Valve      Moderate mitral regurgitation is present.      Mild mitral annular calcification. Tricuspid Valve      Mild tricuspid regurgitation. RVSP is 40 mmHg. Aortic Valve      Aortic valve opens well.      Mild aortic regurgitation is noted. Pulmonic Valve      Mild pulmonic regurgitation present. Pericardial Effusion      No evidence of pericardial effusion. Aorta      Aortic root dimension within normal limits. Miscellaneous      Normal Inferior Vena Cava diameter and respiratory variation  Summary      Ejection fraction is visually estimated at 35%.     Left ventricle is severely enlarged . LVEDD 81 mm      Global hypokinesis. PRISCILLA (5/29/2018, Dr. Galdame[facility])  Left Ventricle     Ejection fraction is visually estimated at 30%. Left Atrium     No evidence of thrombus within left atrium. Right Atrium     No evidence of thrombus or mass in the right atrium. Mitral Valve     Severe mitral regurgitation is present.   Tricuspid Valve  Mild to moderate tricuspid regurgitation. Aortic Valve      The aortic valve is trileaflet.      Aortic valve opens well.      Mild aortic regurgitation is noted. 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 Pericardial Effusion      There is no pericardial effusion. Aorta      Mild plaque. Miscellaneous      Inferior Vena Cava not well visualized. Summary      No evidence of thrombus within left atrium.      Severe mitral regurgitation is present.      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 ECG:   sinus teresa, 1st AVB, LBBB        Assessment:   1. Acute on chronic HFrEF  -acc stage c >> d  -nyha class III  -BNP 58232 >> 65085  -increased weight (approximately 20 lbs since admission)  -warm and well perfused  -lower extremity edema  -JVD  -clinically hypervolemic        2. Nonischemic cardiomyopathy, dilated  -LHC (4/2014) Normal coronaries with some luminal irregularities. EF 55%. -Dx 12/2016  -LVEDD 81 mm (echo 4/2017)  -preserved RV function.   -Limited in past GDMT due to CKD and bradycardia  -EP fol        3. Severe MR  -functional  -CTS consulted. 4. Persistent Atrial Fibrillation   -Dx 5/2018  -chadsvasc : 6  -anticoagulated with dose reduced Eliquis  -PRISCILLA/DCCV (5/29/18). -presented to [facility] in afib RVR this admission, spontaneous converted. -She was bradycardic following restoration of sinus rhythm and Toprol XL was stopped. 6. LBBB  -EP following  -considering ICD implant. 7. Pulmonic valve mass  -PRISCILLA 5/29/2018 (Dr. 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 8. SHIELA on top of CKD  -follows with Dr Blaine Malhotra   -reason not on ACEI/ARB/Aldactone      9. Elevated transaminase  -improving      10. HTN  -controlled. 11. HLD      12. Anemia  -normocytic normochromic  -on iron supplementation  -normal iron sat (5/2018)      13.  Hypothyroidism   -on replacement therapy  -tsh 3.44 (5/8/18)         Plan:   -start diuretics, will monitor UOP to avoid too brisk diuresis  -proBNP QOD  -monitor BUN:Cr, CO2, lytes BID  -lactic acid tonight after diuresis  -start hydralazine 12.5 mg TID  -avoid isordil for now given age  -want to avoid significant hypotension so will start afterload reduction low with slow up titration  -daily weights  -strict intake/output  -sodium restrictions      Above discussed with Dr. Ben Cobos.      Electronically signed by CHACHO Mccloud - CNP on 6/15/2018 at 8:26 AM          I have reviewed the notes, assessments, and/or procedures performed by Ludwig Calvin NP, I interviewed and examined the patient, I concur with her/his documentation of Trini Cortez.        HPI: patient with long standing NICM, LVEF 20%, LVEDD 81 mm April 2017 on TTE (!!!), severe functional MR, recurrent ADHF, acute on chronic renal insufficiency, here with ADHF, worsening SHIELA due to ongoing cardiorenal/congestive nephropathy, bradycardia inhibiting optimization of GDMT, history tachy teresa, no intracardiac device, LBBB, ? pulmonic valve mass, HTN, DL, anemia on iron supplementation, hypothyroidism.        VS: afebrile, HR 40s-50s, /64, RR 18, Spo2 97% on 3 L/min (not on chronic O2)  General - stated age, NAD  HEENT - AT/NC  Neck - significant JVD  Chest - minimal air movement, diminished at the R > L  Cardiac - bradycardia, s1s2, III/VI holosystolic apical murmur   Abdomen - soft, normal bowel sounds  Extremities - 3 + pitting non tender edema, warm, well perfused  Neuro - CN II-XII grossly intact, cognitively intact           ECG: sinus teresa, 1st AVB, LBBB        Labs:     6/12/2018 06:16 6/13/2018 09:30 6/14/2018 02:52 6/15/2018 07:22   Sodium 138 133 132 139   Potassium 5.0 4.9 5.3 (H) 5.8 (H)   Chloride 100 96 (L) 96 (L) 101   CO2 21 (L) 21 (L) 20 (L) 22   BUN 64 (H) 72 (H) 74 (H) 74 (H)   Creatinine 2.2 (H) 2.7 (H) 3.1 (H) 3.1 (H)   Anion Gap 17 (H) 16 16 16   GFR  21 17 14 14   Magnesium 2.6     2.6   Lactic Acid       1.8   Glucose 103 140 (H) 91 90   Calcium 9.1 9.1 9.1 8.9   Phosphorus     4.6 (H)     Total Protein 7.1 7.0 7.0 6.4           6/7/2018 09:54 6/7/2018 16:30 6/7/2018 22:14 6/15/2018    Pro-BNP >70,000 (H)     >70,000 (H)   Troponin 0.13 (H) 0.13 (H) 0.12 (H)             6/12/2018 06:16 6/13/2018 09:30 6/14/2018 02:52 6/15/2018 07:22   Albumin 3.5 3.6 3.4 (L) 3.1 (L)   Alk Phos 61 68 78 77   ALT 44 (H) 587 (H) 549 (H) 440 (H)   AST 49 (H) 733 (H) 479 (H) 276 (H)   Bilirubin 0.7 0.7 0.6 0.6   Total Protein 7.1 7.0 7.0 6.4           6/12/2018 06:16 6/13/2018 09:30 6/14/2018 02:52 6/15/2018 07:22   WBC 4.5 5.3 5.0 4.5   RBC 3.28 (L) 3.21 (L) 3.26 (L) 3.30 (L)   Hemoglobin  10.4 (L) 10.2 (L) 10.6 (L) 10.4 (L)   Hematocrit 32.8 (L) 31.8 (L) 33.2 (L) 33.4 (L)   .0 (H) 99.1 101.8 (H) 101.2 (H)   MCH 31.7 31.8 32.5 31.5   MCHC 31.7 (L) 32.1 31.9 (L) 31.1 (L)   MPV 12.3 (H) 12.5 (H) 12.5 (H) 12.0   RDW 15.0 15.4 (H) 15.5 (H) 15.6 (H)   Platelet Count 356 795 138 131           6/15/2018 07:22   Ferritin 496   Iron 80   Iron Saturation 37   TIBC 217 (L)            CXR: pending        Assessment:  1. Acute on chronic heart failure with reduced LVEF  -NYHA FC 3  -JVD  -THERESA  -rising proBNP (disproportionate to rise in BUN:Cr)  -pulmonary edema  -warm, well perfused, cognitively intact  -start diuretics, will monitor UOP to avoid too brisk diuresis  -proBNP QOD  -monitor BUN:Cr, CO2, lytes BID  -lactic acid tonight after diuresis  -start hydralazine 12.5 mg TID  -avoid isordil for now given age  -want to avoid significant hypotension so will start afterload reduction low with slow up titration  -daily weights  -strict intake/output  -sodium restrictions     *patient had IVF running the entire time she was in the hospital. Stopped on arrival here. Needs diuresis.          2. NICM  -ACC/AHA stage D  -LVEDD 81 mm  -severe secondary MR  -April 2017 - normal RV function  --> PRISCILLA reviewed.  Repeat TTE for better biv dimensions and assessment  -[facility] in 2016 - normal coronaries  -suboptimal GDMT - tachy teresa, off BB, and CKD so has been off of ACE I/ARB/ARNI           3. Severe functional MR  -awaiting CTS consultation  -? If she has any options for severe MR with severe LV dysfunction  -jasson clip?            4. Acute on chronic renal insufficiency  -resume bumetanide   -monitor BUN:Cr, CO2, lytes BID  -lactic acid tonight after diuresis  -start hydralazine 12.5 mg TID  -avoid isordil for now given age  -want to avoid significant hypotension so will start afterload reduction low with slow up titration           5. LBBB, no ICD at this point  -EP on board  Wilmar Linares M.D.   Advanced Heart Failure and Pulmonary Hypertension  [place] [phone]

## 2018-06-15 NOTE — CONSULTS
I have reviewed the notes, assessments, and/or procedures performed by Ileana Matos NP, I interviewed and examined the patient, I concur with her/his documentation of [address] HPI: patient with long standing NICM, LVEF 20%, LVEDD 81 mm April 2017 on TTE (!!!), severe functional MR, recurrent ADHF, acute on chronic renal insufficiency, here with ADHF, worsening SHIELA due to ongoing cardiorenal/congestive nephropathy, bradycardia inhibiting optimization of GDMT, history tachy teresa, no intracardiac device, LBBB, ? pulmonic valve mass, HTN, DL, anemia on iron supplementation, hypothyroidism.       VS: afebrile, HR 40s-50s, /64, RR 18, Spo2 97% on 3 L/min (not on chronic O2)  General - stated age, NAD  HEENT - AT/NC  Neck - significant JVD  Chest - minimal air movement, diminished at the R > L  Cardiac - bradycardia, s1s2, III/VI holosystolic apical murmur   Abdomen - soft, normal bowel sounds  Extremities - 3 + pitting non tender edema, warm, well perfused  Neuro - CN II-XII grossly intact, cognitively intact        ECG: sinus teresa, 1st AVB, LBBB      Labs:    6/12/2018 06:16 6/13/2018 09:30 6/14/2018 02:52 6/15/2018 07:22   Sodium 138 133 132 139   Potassium 5.0 4.9 5.3 (H) 5.8 (H)   Chloride 100 96 (L) 96 (L) 101   CO2 21 (L) 21 (L) 20 (L) 22   BUN 64 (H) 72 (H) 74 (H) 74 (H)   Creatinine 2.2 (H) 2.7 (H) 3.1 (H) 3.1 (H)   Anion Gap 17 (H) 16 16 16   GFR  21 17 14 14   Magnesium 2.6   2.6   Lactic Acid    1.8   Glucose 103 140 (H) 91 90   Calcium 9.1 9.1 9.1 8.9   Phosphorus   4.6 (H)    Total Protein 7.1 7.0 7.0 6.4        6/7/2018 09:54 6/7/2018 16:30 6/7/2018 22:14 6/15/2018    Pro-BNP >70,000 (H)   >70,000 (H)   Troponin 0.13 (H) 0.13 (H) 0.12 (H)         6/12/2018 06:16 6/13/2018 09:30 6/14/2018 02:52 6/15/2018 07:22   Albumin 3.5 3.6 3.4 (L) 3.1 (L)   Alk Phos 61 68 78 77   ALT 44 (H) 587 (H) 549 (H) 440 (H)   AST 49 (H) 733 (H) 479 (H) 276 (H)   Bilirubin 0.7 0.7 0.6 0.6   Total Protein 7.1 7.0 7.0 6.4        6/12/2018 06:16 6/13/2018 09:30 6/14/2018 02:52 6/15/2018 07:22   WBC 4.5 5.3 5.0 4.5   RBC 3.28 (L) 3.21 (L) 3.26 (L) 3.30 (L)   Hemoglobin  10.4 (L) 10.2 (L) 10.6 (L) 10.4 (L)   Hematocrit 32.8 (L) 31.8 (L) 33.2 (L) 33.4 (L)   .0 (H) 99.1 101.8 (H) 101.2 (H)   MCH 31.7 31.8 32.5 31.5   MCHC 31.7 (L) 32.1 31.9 (L) 31.1 (L)   MPV 12.3 (H) 12.5 (H) 12.5 (H) 12.0   RDW 15.0 15.4 (H) 15.5 (H) 15.6 (H)   Platelet Count 588 666 138 131        6/15/2018 07:22   Ferritin 496   Iron 80   Iron Saturation 37   TIBC 217 (L)         CXR: pending      Assessment:  1. Acute on chronic heart failure with reduced LVEF  -NYHA FC 3  -JVD  -THERESA  -rising proBNP (disproportionate to rise in BUN:Cr)  -pulmonary edema  -warm, well perfused, cognitively intact  -start diuretics, will monitor UOP to avoid too brisk diuresis  -proBNP QOD  -monitor BUN:Cr, CO2, lytes BID  -lactic acid tonight after diuresis  -start hydralazine 12.5 mg TID  -avoid isordil for now given age  -want to avoid significant hypotension so will start afterload reduction low with slow up titration  -daily weights  -strict intake/output  -sodium restrictions    *patient had IVF running the entire time she was in the hospital. Stopped on arrival here. Needs diuresis. 2. NICM  -ACC/AHA stage D  -LVEDD 81 mm  -severe secondary MR  -April 2017 - normal RV function  --> PRISCILLA reviewed. Repeat TTE for better biv dimensions and assessment  -[facility] in 2016 - normal coronaries  -suboptimal GDMT - tachy teresa, off BB, and CKD so has been off of ACE I/ARB/ARNI        3. Severe functional MR  -awaiting CTS consultation  -? If she has any options for severe MR with severe LV dysfunction  -jasson clip? 4.  Acute on chronic renal insufficiency  -resume bumetanide   -monitor BUN:Cr, CO2, lytes BID  -lactic acid tonight after diuresis  -start hydralazine 12.5 mg TID  -avoid isordil for now given age  -want to avoid significant hypotension so will start afterload reduction low with slow up titration        5. LBBB, no ICD at this point  -EP on board        Camden Eng M.D.   Advanced Heart Failure and Pulmonary Hypertension  [place] [phone]

## 2018-06-16 NOTE — PROGRESS NOTES
tibial pulses bilaterally            Laboratory Tests:     6/13/2018 09:30 6/14/2018 02:52 6/15/2018 07:22 6/15/2018 15:24 6/16/2018 07:32   Sodium 133 132 139 136 138   Potassium 4.9 5.3 (H) 5.8 (H) 4.2 3.5   Chloride 96 (L) 96 (L) 101 100 100   CO2 21 (L) 20 (L) 22 19 (L) 24   BUN 72 (H) 74 (H) 74 (H) 73 (H) 62 (H)   Creatinine 2.7 (H) 3.1 (H) 3.1 (H) 2.8 (H) 2.5 (H)   Anion Gap 16 16 16 17 (H) 14   GFR Non- 17 14 14 16 18   GFR  20 17 17 19 22   Magnesium   2.6  2.2   Lactic Acid   1.8     Glucose 140 (H) 91 90 130 (H) 85   Calcium 9.1 9.1 8.9 8.7 8.3 (L)   Phosphorus  4.6 (H)      Total Protein 7.0 7.0 6.4  6.2 (L)   Meter Glucose        Pro-BNP   >70,000 (H)     Albumin 3.6 3.4 (L) 3.1 (L)  3.0 (L)   Alk Phos 68 78 77  68    (H) 549 (H) 440 (H)  341 (H)    (H) 479 (H) 276 (H)  159 (H)   Bilirubin 0.7 0.6 0.6  0.6         Diagnostics:   Repeat echocardiogram is pending      ECG - SB, 1st degree HB, and LBBB        Current Medications:  Current Facility-Administered Medications   Medication Dose Route Frequency Provider Last Rate Last Dose    apixaban (ELIQUIS) tablet 2.5 mg  2.5 mg Oral BID Lea Dowling DO        glucose (GLUTOSE) 40 % oral gel 15 g  15 g Oral PRN Toby Zaldivar MD        dextrose 50 % solution 12.5 g  12.5 g Intravenous PRN Toby Zaldivar MD        glucagon (rDNA) injection 1 mg  1 mg Intramuscular PRN Toby Zaldivar MD        dextrose 5 % solution  100 mL/hr Intravenous PRN Toby Zaldivar MD        bumetanide [facility]) injection 2 mg  2 mg Intravenous BID Addis Lopez MD   2 mg at 06/16/18 0847    hydrALAZINE (APRESOLINE) tablet 12.5 mg  12.5 mg Oral 3 times per day Addis Lopez MD   12.5 mg at 06/16/18 0618    perflutren lipid microspheres (DEFINITY) injection 1.65 mg  1.5 mL Intravenous ONCE PRN CHACHO Singleton - CNP        [START ON 6/18/2018] vitamin D (CHOLECALCIFEROL) tablet 1,000 Units  1,000 Units Oral Once per day on Mon

## 2018-06-16 NOTE — PROGRESS NOTES
agitation. The patient is not nervous/anxious. PHYSICAL EXAM:  Vitals:    06/16/18 0000 06/16/18 0602 06/16/18 0615 06/16/18 0745   BP: 101/65  106/76 (!) 108/56   Pulse: 56  57 58   Resp: 18   20   Temp: 98 °F (36.7 °C)   97.8 °F (36.6 °C)   TempSrc: Temporal   Temporal   SpO2: 98%   98%   Weight:  187 lb 4.8 oz (85 kg)       Constitutional: Oriented to person, place, and time. Head: Normocephalic and atraumatic. Eyes: Conjunctivae are normal.    Neck: JVP at 10 cm. Cardiovascular: S1 normal, S2 normal and intact distal pulses. A bradycardic regular rhythm present. Gr III/VI KESHA at the apex. Pulmonary/Chest: Effort normal and breath sounds diminished throughout with crackles at the bases. Abdominal: Soft. Normal appearance. No tenderness. Musculoskeletal: Normal range of motion of all extremities, no muscle weakness. Neurological: Alert and oriented to person, place, and time. Skin: Skin is warm and dry. No bruising, no ecchymosis and no rash noted. Extremity: No clubbing or cyanosis. +3 THERESA    Psychiatric: Normal mood and affect. Thought content normal.     Pertinent Labs:  CBC:   Recent Labs      06/13/18   0930  06/14/18   0252  06/15/18   0722   WBC  5.3  5.0  4.5   HGB  10.2*  10.6*  10.4*   HCT  31.8*  33.2*  33.4*   PLT  150  138  131     BMP:  Recent Labs      06/14/18   0252  06/15/18   0722  06/15/18   1524   NA  132  139  136   K  5.3*  5.8*  4.2   CL  96*  101  100   CO2  20*  22  19*   BUN  74*  74*  73*   CREATININE  3.1*  3.1*  2.8*   CALCIUM  9.1  8.9  8.7     ABGs: No results found for: PHART, PO2ART, PDW5RPP  INR: No results for input(s): INR in the last 72 hours. BNP: No results for input(s): BNP in the last 72 hours. TSH:   Lab Results   Component Value Date    TSH 3.440 05/08/2018      Cardiac Injury Profile: No results for input(s): CKTOTAL, CKMB, CKMBINDEX, TROPONINI in the last 72 hours.    Lipid Profile:   Lab Results   Component Value Date    TRIG 56 04/05/2016    HDL Ascending Aorta: 3.1 cm   Diastolic: 0.6   AV Vena Contracta: 0.1 cm       LA volume/Index: 177 ml   cm               LVOT: 2.1 cm                    /76UY/W^9   Septum Systolic:                                 RA Area: 16 cm^2   1.2 cm                                                    IVC Expiration: 0.7 cm   LV Mass: 227.67   g      MV Vena   Contracta: 0.2   cm     Doppler Measurements & Calculations      MV Peak E-Wave: 0.68 AV Peak Velocity: 1.92 m/s     LVOT Peak Velocity:   m/s                  AV Peak Gradient: 14.78 mmHg   0.98 m/s   MV Peak A-Wave: 1.07 AV Mean Velocity: 1.18 m/s     LVOT Mean Velocity:   m/s                  AV Mean Gradient: 6.5 mmHg     0.62 m/s   MV E/A Ratio: 0.63   AV VTI: 41.8 cm                LVOT Peak Gradient:   MV Peak Gradient:    AV Area (Continuity):1.83 cm^2 3.9 mmHgLVOT Mean   5.2 mmHg             AV Deceleration Time: 1580. 6   Gradient: 1.9 mmHg   MV Mean Gradient:    msec                           Estimated RVSP: 40   1.7 mmHg             LVOT VTI: 22.1 cm              mmHg   MV Mean Velocity:    AV P1/2t: 886 msec             Estimated RAP:3 mmHg   0.6 m/s              IVRT: 110.7 msec   MV Deceleration   Time: 388.1 msec     Pulm. Vein A Reversal          TR Velocity:3.04 m/s   MV P1/2t: 166.6 msec Duration:133.8 msec            TR Gradient:36.97 mmHg   MVA by PHT:1.32 cm^2 MV Vena Contracta Width: 0.2   PV Peak Velocity: 1.11   MV Area              cmPulm. Vein D Velocity:0.3    m/s   (continuity): 1.8    m/sPulm. Vein A Reversal       PV Peak Gradient: 4.89   cm^2                 Velocity:0.12 m/s              mmHg   MV E' Septal         Pulm.  Vein S Velocity: 0.49    PV Mean Velocity: 0.65   Velocity: 0.04 m/s   m/s                            m/s   MV E' Lateral                                       PV Mean Gradient: 2   Velocity: 0.05 m/s                                  mmHg   MR Velocity: 5.13   m/s                                                 CA ED Velocity:

## 2018-06-17 NOTE — PROGRESS NOTES
Cardiac Electrophysiology Inpatient Progress Note    Steve Evans  9/4/1933  Date of Service: 6/17/2018  PCP: Karuna Canseco MD  Cardiologist: Nisha Wang MD  Electrophysiologist: Charmayne Dubois, MD        Subjective: Steve Evans is seen in hospital follow-up and management of: persistent AF, acute on chronic systolic HF, LBBB, severe MR, cardiorenal syndrome and bradycardia. Overnight, she went back into atrial fibrillation with a controlled ventricular response at rest. With ambulation this morning heart rates to go up to 120-130 bpm transiently. She remains off beta blocker and amiodarone presently. I appreciate both Dr. Kalee Villanueva and Awilda's notes from yesterday. Currently she denies any chest pain, shortness of breath, orthopnea or PND. Her LFTs are trending down as well as her creatinine and BNP. She still is in the process of talking to her family regarding evaluation at [facility] for the mitral clip procedure. Of note, she is - 7,410 since admission on 6/14/18.     Patient Active Problem List   Diagnosis    Dilated cardiomyopathy ([facility] [address]    Essential hypertension    Severe mitral regurgitation    Hypothyroidism    CKD (chronic kidney disease) stage 4, GFR 15-29 ml/min ([facility])    Acute on chronic combined systolic and diastolic CHF (congestive heart failure) ([facility])    PAF (paroxysmal atrial fibrillation) ([facility])    Stage 4 chronic kidney disease (HCC)    Chronic anemia    Pleural effusion, bilateral    Persistent atrial fibrillation (HCC)    Atrial fibrillation with rapid ventricular response (HCC)    Congestive heart failure due to valvular disease ([facility])    Cardiogenic shock (HCC)    Sinus node dysfunction (HCC)    LBBB (left bundle branch block)         Scheduled Medications:   apixaban  2.5 mg Oral BID    bumetanide  1 mg Intravenous BID    hydrALAZINE  12.5 mg Oral 3 times per day    [START ON 6/18/2018] vitamin D  1,000 Units Oral Once per day on Mon    ferrous fumarate-vitamin c  1 tablet Oral BID    levothyroxine  125 mcg Oral Daily    therapeutic multivitamin-minerals  1 tablet Oral QAM      dextrose       PRN Medications:  glucose, dextrose, glucagon (rDNA), dextrose, perflutren lipid microspheres, docusate sodium, acetaminophen    No Known Allergies    Wt Readings from Last 3 Encounters:   06/17/18 185 lb 12.8 oz (84.3 kg)   06/14/18 198 lb (89.8 kg)   05/29/18 170 lb (77.1 kg)     Temp Readings from Last 3 Encounters:   06/17/18 97 °F (36.1 °C) (Temporal)   06/14/18 97.4 °F (36.3 °C) (Oral)   05/29/18 97.8 °F (36.6 °C) (Temporal)     BP Readings from Last 3 Encounters:   06/17/18 102/68   06/14/18 (!) 95/58   05/29/18 (!) 102/54     Pulse Readings from Last 3 Encounters:   06/17/18 91   06/14/18 (!) 46   05/29/18 56         Intake/Output Summary (Last 24 hours) at 06/17/18 0919  Last data filed at 06/17/18 0531   Gross per 24 hour   Intake              540 ml   Output             5700 ml   Net            -5160 ml       ROS:   Constitutional: Negative for fever, + activity change and appetite change. HENT: Negative for epistaxis, difficulty swallowing. Eyes: Negative for blurred vision or double vision. Respiratory: +for cough, shortness of breath and wheezing- Improved  Cardiovascular: Negative for chest pain, palpitations and + leg swelling. Gastrointestinal: Negative for abdominal pain, heartburn, or blood in stool. Genitourinary: Negative for hematuria, burning or frequency. Musculoskeletal: Negative for myalgias, stiffness, or swelling. Skin: Negative for rash, pain, or lumps. Neurological: Negative for syncope, seizures, or headaches. Psychiatric/Behavioral: Negative for confusion and agitation. The patient is not nervous/anxious.     PHYSICAL EXAM:  Vitals:    06/16/18 2245 06/17/18 0000 06/17/18 0531 06/17/18 0750   BP: 100/68 108/62  102/68   Pulse:  80  91   Resp:  18  16   Temp:  97.8 °F (36.6 °C)  97 °F (36.1 °C)   TempSrc:  Oral Temporal   SpO2:  99%  97%   Weight:   185 lb 12.8 oz (84.3 kg)    Height:         Constitutional: Oriented to person, place, and time. Head: Normocephalic and atraumatic. Eyes: Conjunctivae are normal.    Neck: JVP at 10 cm. Cardiovascular: S1 normal, S2 normal and intact distal pulses. An irregular rhythm is present. Gr III/VI KESHA at the apex. Pulmonary/Chest: Effort normal and breath sounds diminished throughout with crackles at the bases. Abdominal: Soft. Normal appearance. No tenderness. Musculoskeletal: Normal range of motion of all extremities, no muscle weakness. Neurological: Alert and oriented to person, place, and time. Skin: Skin is warm and dry. No bruising, no ecchymosis and no rash noted. Extremity: No clubbing or cyanosis. +2 THERESA    Psychiatric: Normal mood and affect. Thought content normal.     Pertinent Labs:  CBC:   Recent Labs      06/15/18   0722  18   0732  18   0441   WBC  4.5  3.7*  4.3*   HGB  10.4*  10.2*  10.7*   HCT  33.4*  32.6*  33.1*   PLT  131  136  153     BMP:  Recent Labs      06/15/18   1524  18   0732  18   0441   NA  136  138  140   K  4.2  3.5  3.7   CL  100  100  99   CO2  19*  24  26   BUN  73*  62*  58*   CREATININE  2.8*  2.5*  2.2*   CALCIUM  8.7  8.3*  8.3*     ABGs: No results found for: PHART, PO2ART, XSH8VMJ  INR: No results for input(s): INR in the last 72 hours. BNP: No results for input(s): BNP in the last 72 hours. TSH:   Lab Results   Component Value Date    TSH 3.440 2018      Cardiac Injury Profile: No results for input(s): CKTOTAL, CKMB, CKMBINDEX, TROPONINI in the last 72 hours. Lipid Profile:   Lab Results   Component Value Date    TRIG 56 2016    HDL 53 2016    LDLCALC 74 2016    CHOL 138 2016      Hemoglobin A1C: No components found for: HGBA1C   Xray: No results found.     Pertinent Cardiac Testing:     Last CXR: 2018  Patient MRN:  [id number]   : 1933   Age: 80 years Gender: Female       EXAM: XR CHEST PORTABLE       INDICATION: Atrial fibrillation       COMPARISON: 5/7/2018       FINDINGS:   Single frontal view. Moderate cardiomegaly. Mild pulmonary vascular   congestion. Moderate right basilar airspace disease. Mild left basilar   airspace disease favor atelectasis. Mild-to-moderate pulmonary   edema/vascular congestion. Small pleural effusions.           Impression       1. Moderate cardiomegaly with mild to moderate pulmonary vascular   congestion and pulmonary edema. 2. Bibasilar airspace disease most confluent within the right lung   base. 3. Small pleural effusions      Last Echo: 4/24/2017        Arnulfo Aleman MD 4/24/2017     Narrative       Transthoracic Echocardiography Report (TTE)     Demographics      Patient Name       BIBIANA CROCKETT Gender              Female                      P      Medical Record     [id number]          Room Number         Ness Mendoza      Account #         [de-identified]        Procedure Date      04/24/2017      Corporate ID                         Ordering Physician Nika Gonzalez MD      Accession Number   [id number]         Referring Physician Artis Cline MD      Date of Birth      09/04/1933        Sonographer         Aylin Aamir [facility]      Age                83 year(s)        Interpreting       Nika Gonzalez MD                                        Physician                                           Any Other     Procedure    Type of Study      TTE procedure:Echo Complete W/ Dop & Color Flow.     Procedure Date  Date: 04/24/2017 Start: 07:55 AM    Study Location: Echo Lab  Technical Quality: Adequate visualization    Indications:Cardiomyopathy.     Patient Status: Routine    Height: 69 inches Weight: 165 pounds BSA: 1.9 m^2 BMI: 24.37 kg/m^2    BP: 110/70 mmHg    Allergies    - No known allergies.     Findings      Left Ventricle   Ejection fraction is visually estimated at 35%.   Left ventricle is severely enlarged .   Global luda     [email or web address]? [id number]  [id number]           Specimen Collected: 04/24/17 07:55         PRISCILLA 5/29/2018        Raynaldo Olszewski, MD 5/29/2018     Narrative       Transesophageal Echocardiography Report (PRISCILLA)      Demographics      Patient Name   BIBIANA       Gender           Female                  KEIRA CONSTANTINO      Medical Record [id number]        Room Number   Number      Account #     [de-identified]       Procedure Date   05/29/2018      Corporate ID                   Ordering         Bob Parker MD                                  Physician      Accession      [id number]       Referring   Number                         Physician      Date of Birth  09/04/1933      Sonographer      Teresa Rhoades RDRICHAR      Age            84 year(s)      Interpreting     [address]                              Physician        Physician Cardiology                                                   Bob Parker MD                                     Any Other     Procedure    Type of Study      PRISCILLA procedure:Transesophageal Echo (PRISCILLA), Doppler Echocardiography, Color   Flow Velocity Mapping.     Procedure Date  Date: 05/29/2018 Start: 12:28 PM    Study Location: Portable  Technical Quality: Adequate visualization    Indications:Atrial fibrillation. Patient Status: Routine    Contrast Medium: Bubble Study. Height: 70 inches Weight: 170 pounds BSA: 1.95 m^2 BMI: 24.39 kg/m^2    HR: 64 bpm BP: 97/63 mmHg    PRISCILLA Performed By: the attending and the sonographer     Type of Anesthesia: Moderate sedation      Findings      Left Ventricle   Ejection fraction is visually estimated at 30%.       Left Atrium   No evidence of thrombus within left atrium.      Right Atrium   No evidence of thrombus or mass in the right atrium.      Mitral Valve   Severe mitral regurgitation is present.      Tricuspid Valve   Mild to moderate tricuspid    The patient was slightly hypertensive during the procedure.   2.    No gradient across the aortic valve.   3.    Elevated left ventricular end diastolic pressure, around 20 mmHg.      INTERPRETATION OF LEFT VENTRICULOGRAPHY:  This was done through a hand   injection.  Only 8 mL _____ were used.  The LV appears to be normal in size   and function.  Ejection fraction around 55% with no evidence of wall motion   abnormalities.      INTERPRETATION OF CORONARY ANGIOGRAPHY:   1.    The left main is a large vessel in length and diameter.  It bifurcates      into both left anterior descending and left circumflex.  There is about      10% to 20% proximal lesion.   2.    The left anterior descending artery is a large vessel, extends and      wraps around the apex and gives rise to a large diagonal branch with some      luminal irregularities but no significant disease.   3.    The left circumflex is a large, nondominant vessel, gives rise to an      early obtuse marginal branch that could be interpreted as a ramus      intermedius.  There are only some luminal irregularities.   4.    The right coronary artery is a huge, dominant vessel with no      significant disease.   5.    Right common femoral is a large vessel with no significant disease.      FINAL IMPRESSION:   1.    Normal LV size and function with estimated ejection fraction of 55%   with no evidence of   wall motion abnormalities.   2.    Normal coronaries with some luminal irregularities.   3.    Elevated left ventricular end diastolic pressure around 20 mmHg.      PLAN:   1.    Continue Toprol for now and may stop aspirin.   2.    Check BNP in 1 week.   3.    Patient will follow up with me within 3 months as an outpatient.               Dictated by: Juan Gallagher M.D. Telemetry6/17/2018: AF with a CVR at rest.  Up to 120-130 bpm with ambulation. ECG 06/17/18: SB with a 1st degree AVD, LAFB, LBBB.     I have independently reviewed all of the ECGs

## 2018-06-17 NOTE — PROGRESS NOTES
[facility], [address] [facility], [place], [address] [place] [address] [address]         Phone: [phone]  Fax: [phone] MARKO [address]ims., [address] [facility], [address] [place] [address]   Phone: [phone]  Fax: [phone]    Melisa Pappas M.D. STEVEN Membreno M.D.                   6/16/2018   Florina Balo Dana 9/4/1933 6/9 pt seen on ivfs some edema no jvd I can see cr is better and bp better with ivfs   Obviously at risk for chf EF 30% with severe MR   6/10 pt seen resting no c/o but is having some arriaga edema may be a little worse  6/11: Feeling a little better. Still dyspneic with even minimal exertion. Swelling persists, a little worse than this past Friday. Appetite okay. No chest pain or palpitations. Back in NSR  6/12: More dyspneic, though even with ambulating was saturating 97%. Lower extremity edema worse. 6/13: Seen this morning. Laboratory studies not available then. \"Rough night. \"  Dyspnea throughout the night though O2 saturations on pulse ox. Between 95 and 100% on 2 L nasal cannula. Does not feel anxious, she says, though does feel \"nervous. \"  No cough or wheeze chest pain or palpitations. Ongoing fatigue, malaise, generalized weakness. Appetite okay. 6/14: Bradycardic overnight, with HR in the 30s. Hypotensive again. Ongoing fatigue, malaise, dyspnea with even minimal exertion. Swelling a little bit worse. No headache or lightheadedness. No chest pain or palpitations. She is aware that her heart rate is quite low, though.   No nausea or vomiting.    6/15: Transferred to [address] [place] [address] further evaluation as regards surgical intervention on her severe mitral valve regurgitation, evaluation for pacemaker versus AICD, as well as CHF management. Feeling \"about the same. \"  Lower swelling persists. Still dyspneic with even minimal exertion. No dyspnea at rest.  No cough or wheeze or chest pain or palpitations. Heart rate still in the 50s for the most part though better than 2 days ago. IV fluid stopped. Low-dose hydralazine started. 6/16: Feeling a little better. No dyspnea at rest.  2 L nasal cannula. Swelling better. Physical Exam:   VITALS:  /67   Pulse 77   Temp 98.2 °F (36.8 °C) (Temporal)   Resp 18   Wt 187 lb 4.8 oz (85 kg)   SpO2 96%   BMI 26.87 kg/m²   24HR INTAKE/OUTPUT:      Intake/Output Summary (Last 24 hours) at 06/16/18 2143  Last data filed at 06/16/18 2018   Gross per 24 hour   Intake              780 ml   Output             5350 ml   Net            -4570 ml     Gen. : Age-appropriate white woman in no apparent distress  HEENT: NC/AT EOMI PERRLA sclera and conjunctiva are clear and and icteric mucous membranes are moist  Neck: Soft and supple no lymphadenopathy no thyromegaly, and no carotid bruit  Lungs: Bilateral basilar crackles  Heart: Irregularly irregular rhythm, bradycardic.  2-3/6 HSM at base  Abdomen: Soft nontender nondistended normoactive bowel sounds  Extremities: edema 2+   Neuro: Awake alert appropriate moves all 4 extremities cranial nerves II through XII are grossly intact  Integument: No rash or lesion on    DATA:    CBC:   Lab Results   Component Value Date    WBC 3.7 06/16/2018    RBC 3.27 06/16/2018    HGB 10.2 06/16/2018    HCT 32.6 06/16/2018    MCV 99.7 06/16/2018    MCH 31.2 06/16/2018    MCHC 31.3 06/16/2018    RDW 15.5 06/16/2018     06/16/2018    MPV 11.8 06/16/2018     CMP:    Lab Results   Component Value Date     06/16/2018    K 3.5 06/16/2018    K 5.3 06/14/2018     06/16/2018    CO2 24 06/16/2018    BUN 62 06/16/2018    CREATININE 2.5 06/16/2018    GFRAA 22 06/16/2018    LABGLOM 18 06/16/2018    GLUCOSE 85 06/16/2018    PROT 6.2 06/16/2018    CALCIUM 8.3 secondary to hypoperfusion setting of AF with RVR. Though she does have mild ankle swelling, does not otherwise appear hypervolemic despite the elevated proBNP. Random urine protein:creatinine ratio 0.2. 2. chronic kidney disease baseline creatinine 1.8-2.0 mg/dL secondary to renal microvascular atherosclerotic disease in setting of known coronary and peripheral arterial disease  3. Anemia secondary to CKD, as well as phlebotomy associated with recent hospitalization  4. AF with RVR. Rate controlled. Apparently flipping in and out of AF  5. Edema, chronic, multi-factorial -- dilated CM, severe MR, P HTN, AFib  6. Hypotension   7. Hyperkalemia, resolved. - Edema about the same. Pulmonary edema stable     - Recurrent shiela -- Hypotension coupled with the hemodynamic effect of the mitral valve regurgitation with subsequent bradycardia causing reduced renal blood flow are the culprits.    - Urine studies reflect prerenal azotemia, despite considerable increased total body water;  effectively, this is cardiorenal syndrome.  - With improved heart rate, improved blood pressure, & expansion of intravascular volume azotemia has leveled off. IV fluid stopped. Difficult situation. .. I discussed with her at length yesterday that if current scenario worsens, she may need dialysis, and even if she does not strictly \"need\" it, she may benefit from the opportunity to Curtis" her marked fluid overload with either SCUF or SLEDD. The recurrent SHIELA (after dropping back to baseline cr 1.7 after presenting w/ cr 2.5 mg/dL) was more likely due to bradycardia and hypotension than to the IV Bumex that she was administered    Acute hemodialysis not warranted at this point  IV bumex -- decrease to daily by    if azotemia worsens, hyperkalemia become refractory, consider RRT -- not warranted at this point.   Azotemia improving    Keep lower extremities elevated when not using them  Follow labs, UO  Low na diet    Discussed with Dr. Amie Last MD 6/16/2018

## 2018-06-18 NOTE — PROGRESS NOTES
Cardiac Electrophysiology Inpatient Progress Note    Lala Beatty  9/4/1933  Date of Service: 6/18/2018  PCP: Elayne Sanabria MD  Cardiologist: Mery Rosado MD  Electrophysiologist: Sierra Turner MD        Subjective: Lala Beatty is seen in hospital follow-up and management of: persistent AF, acute on chronic systolic HF, LBBB, severe MR, cardiorenal syndrome and bradycardia. She remains in Afib, rates are controlled. Family is present at the bedside today. Ms Gerardo Alfaro remains unsure on how she would like to move forward regarding her VHD. She offers no new complaints today.      Patient Active Problem List   Diagnosis    Dilated [facility] ([facility]    Essential hypertension    Severe mitral regurgitation    Hypothyroidism    CKD (chronic kidney disease) stage 4, GFR 15-29 ml/min ([facility])    Acute on chronic combined systolic and diastolic CHF (congestive heart failure) ([facility])    PAF (paroxysmal atrial fibrillation) ([facility])    Stage 4 chronic kidney disease ([facility])    Chronic anemia    Pleural effusion, bilateral    Persistent atrial fibrillation ([facility])    Atrial fibrillation with rapid ventricular response ([facility])    Congestive heart failure due to valvular disease ([facility])    Cardiogenic shock ([facility])    Sinus node dysfunction ([facility])    LBBB (left bundle branch block)    Cardiorenal syndrome    Left bundle branch block    Myocardiopathy ([facility]         Scheduled Medications:   sodium chloride flush  10 mL Intravenous BID    apixaban  2.5 mg Oral BID    bumetanide  1 mg Intravenous BID    hydrALAZINE  12.5 mg Oral 3 times per day    vitamin D  1,000 Units Oral Once per day on Mon    ferrous fumarate-vitamin c  1 tablet Oral BID    levothyroxine  125 mcg Oral Daily    therapeutic multivitamin-minerals  1 tablet Oral QAM      dextrose       PRN Medications:  sodium chloride flush, glucose, dextrose, glucagon (rDNA), dextrose, perflutren lipid microspheres, docusate sodium, acetaminophen    No Known Allergies    Wt Readings from Last 3 Encounters:   06/18/18 177 lb 14.4 oz (80.7 kg)   06/14/18 198 lb (89.8 kg)   05/29/18 170 lb (77.1 kg)     Temp Readings from Last 3 Encounters:   06/18/18 97.8 °F (36.6 °C) (Temporal)   06/14/18 97.4 °F (36.3 °C) (Oral)   05/29/18 97.8 °F (36.6 °C) (Temporal)     BP Readings from Last 3 Encounters:   06/18/18 107/66   06/14/18 (!) 95/58   05/29/18 (!) 102/54     Pulse Readings from Last 3 Encounters:   06/18/18 106   06/14/18 (!) 46   05/29/18 56         Intake/Output Summary (Last 24 hours) at 06/18/18 0955  Last data filed at 06/18/18 0840   Gross per 24 hour   Intake             1203 ml   Output             3300 ml   Net            -2097 ml       PHYSICAL EXAM:  Vitals:    06/18/18 0445 06/18/18 0449 06/18/18 0617 06/18/18 0926   BP: (!) 100/55  100/72 107/66   Pulse: 96   106   Resp:    18   Temp:    97.8 °F (36.6 °C)   TempSrc:    Temporal   SpO2:    99%   Weight:  177 lb 14.4 oz (80.7 kg)     Height:       Constitutional: Oriented to person, place, and time. Head: Normocephalic and atraumatic. Eyes: Conjunctivae are normal.    Cardiovascular: S1 normal, S2 normal and intact distal pulses. An irregular rhythm is present. Gr III/VI KESHA at the apex. Pulmonary/Chest: Effort normal and breath sounds diminished throughout with crackles at the bases. Abdominal: Soft. Normal appearance. No tenderness. Musculoskeletal: Normal range of motion of all extremities, no muscle weakness. Neurological: Alert and oriented to person, place, and time. Skin: Skin is warm and dry. No bruising, no ecchymosis and no rash noted. Extremity: No clubbing or cyanosis. +2 BLE edema   Psychiatric: Normal mood and affect.  Thought content normal.     Pertinent Labs:  CBC:   Recent Labs      06/16/18   0732  06/17/18   0441  06/18/18   0432   WBC  3.7*  4.3*  4.5   HGB  10.2*  10.7*  10.5*   HCT  32.6*  33.1*  32.8*   PLT  136  153  155     BMP:  Recent Labs around 20 mmHg.      PLAN:   1.    Continue Toprol for now and may stop aspirin.   2.    Check BNP in 1 week.   3.    Patient will follow up with me within 3 months as an outpatient.   Dictated by: Jeramie Burt M.D. Telemetry6/18/2018: AF with a CVR at rest.  Up to 120-130 bpm with ambulation--- unchanged     Impression:    1. Acute on chronic systolic CHF  - cardiorenal syndrome  - multiple potential competing causes  - BB on hold 2/2 severe bradycardia  - severe MR, EF has been depressed since at least 12/2016 echo  - HF management per Dr. Hakan Mcgovern  - - 7, 410 since admission, WT: 198-->185 lbs. - pending CRT-P/D in the future.     2. LBBB  - based on 2012 CRT guideline update patient has Class I indication for CRT-D  - LBBB with QRS >150ms, EF <=35% and NYHA III symptoms     3. Persistent AF  - Amiodarone on hold secondary to bradycardia as well as elevated LFTs.  - CHADSVASC= 6  - Eliquis 2.5 mg BID  - back in AF 6/17/118  - remains in AF-- rates are controlled      4. Severe MR  - see #1  - Appreciate CT surgery evaluation. Possible transfer/evaluation at [facility] for mitral clip--awaiting family discussion but she appears receptive to this  - again awaiting further decision making      5. Acute on chronic renal insufficiency  Estimated Creatinine Clearance: 24 mL/min (A) (based on SCr of 1.9 mg/dL (H)). Lab Results   Component Value Date    CREATININE 1.9 (H) 06/18/2018   - cardiorenal syndrome  - per nephrology     6. Acute hepatic injury  - Amio stopped  - passive congestion and low flow  - LFTs elevated but stabilizing  Lab Results   Component Value Date     (H) 06/18/2018    AST 79 (H) 06/18/2018    ALKPHOS 54 06/18/2018    BILITOT 0.6 06/18/2018     7. Pulmonic valve mass  - chronic  - defer to cardiology/CTS    Recommendations:    1.  As previously mentioned ---From an EP/device perspective she would be agreeable to implantation, when clinically stable, but will need to decide on CRT-P vs CRT-D with her family before making any final decisions. If MV surgery/Clip is to be considered/indicated, then it would be preferable for her to undergo mitral valve clip 1st prior to device implantation as long as she remains clinically stable from an arrhythmia perspective. If there is no plan for MV surgery, then device implantation timing will be based on her clinical course. There is discussion with regards to [facility] pending her final decision. She continues to remain unsure on how she would like to move forward today     2. She remains in AFib, rates controlled at this time. She currently is off amiodarone and beta blocker therapy given her history of bradycardia in sinus rhythm, however, if this continues to be an issue and we may consider restarting low-dose beta blocker therapy. 3. HF management per Dr. Roddy Fontanez.     Discussed with Dr Jinny FLOR-CNP  [facility]) Cardiac Electrophysiology  [facility] [place]: [facility]

## 2018-06-18 NOTE — PROGRESS NOTES
Advanced Heart Failure and Pulmonary Hypertension  Inpatient Progress Note          CC/ID: patient with long standing NICM, LVEF 20%, LVEDD 81 mm April 2017 on TTE (!!!), severe functional MR, recurrent ADHF, acute on chronic renal insufficiency, here with ADHF, worsening SHIELA due to ongoing cardiorenal/congestive nephropathy, bradycardia inhibiting optimization of GDMT, history tachy teresa, no intracardiac device, LBBB, ? pulmonic valve mass, HTN, DL, anemia on iron supplementation, hypothyroidism. 24-hour events/subjective:  - Improvement in the symptoms  - Improvement in kidney function, with total of 3.3. Her urine output last 24 hours  - Net -9.8 L output since admission  - Weight down to 177 pounds from 187 pounds since admission  - Stable vitals overnight, with controlled blood pressure  - Downtrending proBNP, today 30,243  - Improvement in liver functions    Telemetry:  SB, 1st d hb, LBBB, few PVCs, no major eventse    Physical Exam:    BP (!) 106/56   Pulse 85   Temp 97.8 °F (36.6 °C) (Temporal)   Resp 18   Ht 5' 10\" (1.778 m)   Wt 177 lb 14.4 oz (80.7 kg)   SpO2 99%   BMI 25.53 kg/m²   Wt Readings from Last 3 Encounters:   06/18/18 177 lb 14.4 oz (80.7 kg)   06/14/18 198 lb (89.8 kg)   05/29/18 170 lb (77.1 kg)     Appearance: Awake, alert, no acute respiratory distress, appears younger than stated age  Skin: Intact, no rash  Head: Normocephalic, atraumatic  Eyes: EOMI, no conjunctival erythema, anicteric sclerae  ENMT: No pharyngeal erythema, MMM, no rhinorrhea  Neck: Soft, supple, JVD with a JVP 14 cm h20  Lungs: Clear to auscultation bilaterally. No wheezes, rales, or rhonchi.   Cardiac: Regular rate and rhythm, +S1S2, no murmurs apparent  Abdomen: Soft, nontender, +bowel sounds  Extremities: Moves all extremities x 4, bilteral lower extremity edema  Neurologic: No focal motor deficits apparent, normal mood and affect  Peripheral Pulses: Intact posterior tibial pulses dextrose 50 % solution 12.5 g  12.5 g Intravenous PRN Marizol Dan MD        glucagon (rDNA) injection 1 mg  1 mg Intramuscular PRN Marizol Dan MD        dextrose 5 % solution  100 mL/hr Intravenous PRN Marizol Dan MD        hydrALAZINE (APRESOLINE) tablet 12.5 mg  12.5 mg Oral 3 times per day Anu Lugo MD   12.5 mg at 06/18/18 1350    perflutren lipid microspheres (DEFINITY) injection 1.65 mg  1.5 mL Intravenous ONCE PRN CHACHO Coyle - CNP        vitamin D (CHOLECALCIFEROL) tablet 1,000 Units  1,000 Units Oral Once per day on Leandra Beltran MD   1,000 Units at 06/18/18 0931    docusate sodium (COLACE) capsule 100 mg  100 mg Oral BID PRN Valentina Sim MD        ferrous fumarate-vitamin c (SILKE-SEQUELS) 65-25 MG CR tablet 1 tablet  1 tablet Oral BID Valentina Sim MD   1 tablet at 06/18/18 0931    levothyroxine (SYNTHROID) tablet 125 mcg  125 mcg Oral Daily Valentina Sim MD   125 mcg at 06/18/18 0303    therapeutic multivitamin-minerals 1 tablet  1 tablet Oral QAM Valentina Sim MD   1 tablet at 06/18/18 0931    acetaminophen (TYLENOL) tablet 650 mg  650 mg Oral Q4H PRN Valentina Sim MD          dextrose           Assessment:  1. Acute on chronic heart failure with reduced LVEF  -NYHA FC 3  -JVD +  -THERESA +  -Significant improvement in proBNP  -warm, well perfused, cognitively intact  -Improvement in creatinine, down to 1.9 from 3.1 with diuresis  -Improvement in liver functions, reflects decreased congestion  -hydralazine 12.5 mg TID  -avoid isordil for now given age  -want to avoid significant hypotension so will start afterload reduction low with slow up titration    2. NICM  -ACC/AHA stage D  -LVEDD 81 mm  -severe secondary MR  -April 2017 - normal RV function  --> PRISCILLA reviewed. Repeat TTE for better biv dimensions and assessment  -[facility] in 2016 - normal coronaries  -suboptimal GDMT - tachy teresa, off BB, and CKD so has been off of ACE I/ARB/ARNI        3.  Severe functional

## 2018-06-18 NOTE — PLAN OF CARE
Problem: OXYGENATION/RESPIRATORY FUNCTION  Goal: Patient will maintain patent airway  Outcome: Met This Shift      Problem: HEMODYNAMIC STATUS  Goal: Patient has stable vital signs and fluid balance  Outcome: Met This Shift      Problem: FLUID AND ELECTROLYTE IMBALANCE  Goal: Fluid and electrolyte balance are achieved/maintained  Outcome: Not Met This Shift

## 2018-06-18 NOTE — PROGRESS NOTES
[facility], [address] [facility] [place], [address] [place] [address] [address]         Phone: [phone]  Fax: [phone] [facility], [address] [place] [address]   Phone: [phone]  Fax: [phone]    Boris Villegas M.D. Karo Scott M.D. Maggie West M.D.                   6/18/2018   Linette Cortez 9/4/1933 6/9 pt seen on ivfs some edema no jvd I can see cr is better and bp better with ivfs   Obviously at risk for chf EF 30% with severe MR   6/10 pt seen resting no c/o but is having some arriaga edema may be a little worse  6/11: Feeling a little better. Still dyspneic with even minimal exertion. Swelling persists, a little worse than this past Friday. Appetite okay. No chest pain or palpitations. Back in NSR  6/12: More dyspneic, though even with ambulating was saturating 97%. Lower extremity edema worse. 6/13: Seen this morning. Laboratory studies not available then. \"Rough night. \"  Dyspnea throughout the night though O2 saturations on pulse ox. Between 95 and 100% on 2 L nasal cannula. Does not feel anxious, she says, though does feel \"nervous. \"  No cough or wheeze chest pain or palpitations. Ongoing fatigue, malaise, generalized weakness. Appetite okay. 6/14: Bradycardic overnight, with HR in the 30s. Hypotensive again. Ongoing fatigue, malaise, dyspnea with even minimal exertion. Swelling a little bit worse. No headache or lightheadedness. No chest pain or palpitations. She is aware that her heart rate is quite low, though.   No nausea or vomiting.    6/15: Transferred to [address] [place] [address] further evaluation as regards surgical intervention on her severe mitral valve regurgitation, evaluation for pacemaker versus AICD, as well as CHF management. Feeling \"about the same. \"  Lower swelling persists. Still dyspneic with even minimal exertion. No dyspnea at rest.  No cough or wheeze or chest pain or palpitations. Heart rate still in the 50s for the most part though better than 2 days ago. IV fluid stopped. Low-dose hydralazine started. 6/16: Feeling a little better. No dyspnea at rest.  2 L nasal cannula. Swelling better. 6/18: Feeling still better. Dyspnea much better. Statin the chair for most of the day. Fatigue and malaise persists, though she feels a little more energetic today than yesterday. Appetite improved. Swelling improved      Physical Exam:   VITALS:  /76   Pulse 83   Temp 98 °F (36.7 °C) (Temporal)   Resp 18   Ht 5' 10\" (1.778 m)   Wt 177 lb 14.4 oz (80.7 kg)   SpO2 98%   BMI 25.53 kg/m²   24HR INTAKE/OUTPUT:      Intake/Output Summary (Last 24 hours) at 06/18/18 1832  Last data filed at 06/18/18 1733   Gross per 24 hour   Intake             1083 ml   Output             2650 ml   Net            -1567 ml     Gen. : Age-appropriate white woman in no apparent distress  HEENT: NC/AT EOMI PERRLA sclera and conjunctiva are clear and and icteric mucous membranes are moist  Neck: Soft and supple no lymphadenopathy no thyromegaly, and no carotid bruit  Lungs: Bilateral basilar crackles  Heart: Irregularly irregular rhythm, bradycardic.  2-3/6 HSM at base  Abdomen: Soft nontender nondistended normoactive bowel sounds  Extremities: edema 2+   Neuro: Awake alert appropriate moves all 4 extremities cranial nerves II through XII are grossly intact  Integument: No rash or lesion on    DATA:    CBC:   Lab Results   Component Value Date    WBC 4.5 06/18/2018    RBC 3.34 06/18/2018    HGB 10.5 06/18/2018    HCT 32.8 06/18/2018    MCV 98.2 06/18/2018    MCH 31.4 06/18/2018    MCHC 32.0 06/18/2018    RDW 15.8 06/18/2018     06/18/2018    MPV 10.8 06/18/2018     CMP:    Lab Results   Component Value Date

## 2018-06-18 NOTE — PROGRESS NOTES
Units  1,000 Units Oral Once per day on Shanna Ramesh MD        docusate sodium (COLACE) capsule 100 mg  100 mg Oral BID PRN Elayne Sanabira MD        ferrous fumarate-vitamin c (SILKE-SEQUELS) 65-25 MG CR tablet 1 tablet  1 tablet Oral BID Elayne Sanabria MD   1 tablet at 06/17/18 2125    levothyroxine (SYNTHROID) tablet 125 mcg  125 mcg Oral Daily Elayne Sanabria MD   125 mcg at 06/18/18 9628    therapeutic multivitamin-minerals 1 tablet  1 tablet Oral QAM Elayne Sanabria MD   1 tablet at 06/17/18 0839    acetaminophen (TYLENOL) tablet 650 mg  650 mg Oral Q4H PRN Elayne Sanabria MD          dextrose                    Assessment/Recommendations:  1. Acute on chronic heart failure with reduced LVEF  -NYHA FC 3  -JVD  -THERESA  -rising proBNP (disproportionate to rise in BUN:Cr)  -pulmonary edema  -warm, well perfused, cognitively intact  -continue diuretics, will decrease this to bumetanide 1 mg BID since she was over 1.5 L net negative in 24 hours  -proBNP QOD  -monitor BUN:Cr, CO2, lytes BID  -lactic acid tonight after diuresis  -hydralazine 12.5 mg TID  -avoid isordil for now given age  -want to avoid significant hypotension so will start afterload reduction low with slow up titration  -daily weights  -strict intake/output  -sodium restrictions          2. NICM  -ACC/AHA stage D  -LVEDD 81 mm  -severe secondary MR  -April 2017 - normal RV function  --> PRISCILLA reviewed. Repeat TTE for better biv dimensions and assessment  -[facility] in 2016 - normal coronaries  -suboptimal GDMT - tachy teresa, off BB, and CKD so has been off of ACE I/ARB/ARNI           3. Severe functional MR  -CTS consultation appreciated  -? If she has any options for severe MR with severe LV dysfunction  -jasson clip? Evaluation to take place at [facility]. Will determine timing of this based on optimization of medications the next few days, OP versus IP.  Further discussed case at length last night with patient and her nephew (?DPOA) last night and they are

## 2018-06-19 NOTE — PLAN OF CARE
Problem: Falls - Risk of:  Goal: Will remain free from falls  Will remain free from falls   Outcome: Met This Shift      Problem: OXYGENATION/RESPIRATORY FUNCTION  Goal: Patient will achieve/maintain normal respiratory rate/effort  Respiratory rate and effort will be within normal limits for the patient   Outcome: Met This Shift      Problem: ACTIVITY INTOLERANCE/IMPAIRED MOBILITY  Goal: Mobility/activity is maintained at optimum level for patient  Outcome: Met This Shift

## 2018-06-19 NOTE — PROGRESS NOTES
[facility], [address] [facility] [place], [address] [place] [address] [address]         Phone: [phone]  Fax: [phone] [facility] [address], [place], [address]   Phone: [phone]  Fax: [phone]    Bobby Mac M.D. Ruma Dowell M.D. Rich Perez M.D.                   6/19/2018   Fausto Edwards Dana 9/4/1933 6/9 pt seen on ivfs some edema no jvd I can see cr is better and bp better with ivfs   Obviously at risk for chf EF 30% with severe MR   6/10 pt seen resting no c/o but is having some arriaga edema may be a little worse  6/11: Feeling a little better. Still dyspneic with even minimal exertion. Swelling persists, a little worse than this past Friday. Appetite okay. No chest pain or palpitations. Back in NSR  6/12: More dyspneic, though even with ambulating was saturating 97%. Lower extremity edema worse. 6/13: Seen this morning. Laboratory studies not available then. \"Rough night. \"  Dyspnea throughout the night though O2 saturations on pulse ox. Between 95 and 100% on 2 L nasal cannula. Does not feel anxious, she says, though does feel \"nervous. \"  No cough or wheeze chest pain or palpitations. Ongoing fatigue, malaise, generalized weakness. Appetite okay. 6/14: Bradycardic overnight, with HR in the 30s. Hypotensive again. Ongoing fatigue, malaise, dyspnea with even minimal exertion. Swelling a little bit worse. No headache or lightheadedness. No chest pain or palpitations. She is aware that her heart rate is quite low, though.   No nausea or vomiting.    6/15: Transferred to [facility] further evaluation as regards surgical intervention on her severe mitral valve regurgitation, evaluation for pacemaker versus AICD, as well as CHF Results   Component Value Date     06/19/2018    K 4.1 06/19/2018    K 5.3 06/14/2018    CL 96 06/19/2018    CO2 29 06/19/2018    BUN 42 06/19/2018    CREATININE 1.7 06/19/2018    GFRAA 35 06/19/2018    LABGLOM 29 06/19/2018    GLUCOSE 87 06/19/2018    PROT 6.7 06/19/2018    CALCIUM 8.7 06/19/2018    BILITOT 0.6 06/19/2018    ALKPHOS 52 06/19/2018    AST 56 06/19/2018     06/19/2018     BMP:    Lab Results   Component Value Date     06/19/2018    K 4.1 06/19/2018    K 5.3 06/14/2018    CL 96 06/19/2018    CO2 29 06/19/2018    BUN 42 06/19/2018    CREATININE 1.7 06/19/2018    CALCIUM 8.7 06/19/2018    GFRAA 35 06/19/2018    LABGLOM 29 06/19/2018    GLUCOSE 87 06/19/2018     Ionized Calcium:  No results found for: IONCA  Magnesium:    Lab Results   Component Value Date    MG 2.0 06/19/2018     Phosphorus:    Lab Results   Component Value Date    PHOS 2.7 06/18/2018     U/A:    Lab Results   Component Value Date    COLORU Yellow 05/21/2018    PHUR 7.5 05/21/2018    LABCAST FEW 04/11/2014    WBCUA 1-3 05/21/2018    RBCUA >20 05/21/2018    BACTERIA MANY 05/21/2018    CLARITYU Clear 05/21/2018    SPECGRAV 1.015 05/21/2018    LEUKOCYTESUR SMALL 05/21/2018    UROBILINOGEN 0.2 05/21/2018    BILIRUBINUR Negative 05/21/2018    BLOODU LARGE 05/21/2018    GLUCOSEU Negative 05/21/2018       Hospital Problems:  Patient Active Problem List   Diagnosis    [facility]    Essential hypertension    Severe mitral regurgitation    Hypothyroidism    CKD (chronic kidney disease) stage 4, GFR 15-29 ml/min ([facility])    Acute on chronic combined systolic and diastolic CHF (congestive heart failure) ([facility])    PAF (paroxysmal atrial fibrillation) ([facility])    Stage 4 chronic kidney disease ([facility])    Chronic anemia    Pleural effusion, bilateral    Persistent atrial fibrillation ([facility])    Atrial fibrillation with rapid ventricular response ([facility])    [facility]est[facility] to [facility]   

## 2018-06-19 NOTE — PROGRESS NOTES
microspheres, docusate sodium, acetaminophen    No Known Allergies    Wt Readings from Last 3 Encounters:   06/19/18 176 lb 14.4 oz (80.2 kg)   06/14/18 198 lb (89.8 kg)   05/29/18 170 lb (77.1 kg)     Temp Readings from Last 3 Encounters:   06/19/18 98.3 °F (36.8 °C) (Oral)   06/14/18 97.4 °F (36.3 °C) (Oral)   05/29/18 97.8 °F (36.6 °C) (Temporal)     BP Readings from Last 3 Encounters:   06/19/18 100/75   06/14/18 (!) 95/58   05/29/18 (!) 102/54     Pulse Readings from Last 3 Encounters:   06/19/18 104   06/14/18 (!) 46   05/29/18 56         Intake/Output Summary (Last 24 hours) at 06/19/18 0931  Last data filed at 06/19/18 0532   Gross per 24 hour   Intake              530 ml   Output             2350 ml   Net            -1820 ml       PHYSICAL EXAM:  Vitals:    06/19/18 0015 06/19/18 0045 06/19/18 0531 06/19/18 0620   BP: (!) 94/51 108/74  100/75   Pulse: 93 104     Resp: 18      Temp: 98.3 °F (36.8 °C)      TempSrc: Oral      SpO2: 96%      Weight:   176 lb 14.4 oz (80.2 kg)    Height:       Constitutional: Oriented to person, place, and time. Head: Normocephalic and atraumatic. Eyes: Conjunctivae are normal.    Cardiovascular: S1 normal, S2 normal and intact distal pulses. An irregular rhythm is present. Gr III/VI KESHA at the apex. Pulmonary/Chest: Effort normal and breath sounds diminished throughout with crackles at the bases. Abdominal: Soft. Normal appearance. No tenderness. Musculoskeletal: Normal range of motion of all extremities, no muscle weakness. Neurological: Alert and oriented to person, place, and time. Skin: Skin is warm and dry. No bruising, no ecchymosis and no rash noted. Extremity: No clubbing or cyanosis. +1 BLE edema   Psychiatric: Normal mood and affect.  Thought content normal.     Pertinent Labs:  CBC:   Recent Labs      06/17/18   0441  06/18/18   0432  06/19/18   0518   WBC  4.3*  4.5  5.1   HGB  10.7*  10.5*  11.4*   HCT  33.1*  32.8*  35.5   PLT  153  155  168 in the usual   sterile fashion.  The skin over the right femoral artery was anesthetized   with 1% Xylocaine.  A 5-Swedish femoral sheath was introduced via modified   Seldinger technique without any difficulties.   A 5-Swedish JL #4, 5-Swedish JR   #4, and 5-Swedish pigtail catheter were all used to complete the   catheterization.  Over the guidewire, exchange technique was used.  The   patient tolerated the procedure well.  There were no complications.      HEMODYNAMIC DATA:  As per chart.      INTERPRETATION OF HEMODYNAMIC DATA:   1.    The patient was slightly hypertensive during the procedure.   2.    No gradient across the aortic valve.   3.    Elevated left ventricular end diastolic pressure, around 20 mmHg.      INTERPRETATION OF LEFT VENTRICULOGRAPHY:  This was done through a hand   injection.  Only 8 mL _____ were used.  The LV appears to be normal in size   and function.  Ejection fraction around 55% with no evidence of wall motion   abnormalities.      INTERPRETATION OF CORONARY ANGIOGRAPHY:   1.    The left main is a large vessel in length and diameter.  It bifurcates      into both left anterior descending and left circumflex.  There is about      10% to 20% proximal lesion.   2.    The left anterior descending artery is a large vessel, extends and      wraps around the apex and gives rise to a large diagonal branch with some      luminal irregularities but no significant disease.   3.    The left circumflex is a large, nondominant vessel, gives rise to an      early obtuse marginal branch that could be interpreted as a ramus      intermedius.  There are only some luminal irregularities.   4.    The right coronary artery is a huge, dominant vessel with no      significant disease.   5.    Right common femoral is a large vessel with no significant disease.      FINAL IMPRESSION:   1.    Normal LV size and function with estimated ejection fraction of 55%   with no evidence of   wall motion abnormalities.   2.    Normal coronaries with some luminal irregularities.   3.    Elevated left ventricular end diastolic pressure around 20 mmHg.      PLAN:   1.    Continue Toprol for now and may stop aspirin.   2.    Check BNP in 1 week.   3.    Patient will follow up with me within 3 months as an outpatient.   Dictated by: Elaina Gilford, M.D. Telemetry6/19/2018: AF rates 80s-110s    Impression:    1. Acute on chronic systolic CHF  - cardiorenal syndrome  - multiple potential competing causes  - BB on hold 2/2 prior sever teresa  - severe MR, EF has been depressed since at least 12/2016 echo  - HF management per Dr. Lelia Mc  since admission  - plans for Adriana-clip evaluation PRIOR to CRT implant per Dr. Bran Esqueda and Dr. Linda Couch  - markedly improved clinically  - NYHA III, Stage C  - continue medical therapy per Cardiolgy  - transfer today pending     2. LBBB  - based on 2012 CRT guideline update patient has Class I indication for CRT-D  - LBBB with QRS >150ms, EF <=35% and NYHA III symptoms  - I repeated our discusion with her regarding her wishes and she still remains modestly uncertain if she would be willing to have ICD shocks, and thus I told her this would be primary consideration when deciding about CRT-P vs. CRT-D  - I would be happy to see her post Adriana-Clip to discuss CRT implant     3. Persistent AF  - Amiodarone on hold secondary to bradycardia as well as elevated LFTs.  - CHADSVASC= 6  - Eliquis 2.5 mg BID  - back in AF 6/17/118  - remains in AF-- rates are controlled   - ok to resume low dose BB from EP standpoint if ok with Cardiology from CHF perspective  - clear Tachy/teresa syndrome     4. Severe MR  - see #1  - Appreciate CT surgery evaluation. Possible transfer/evaluation at [facility] for mitral clip--awaiting family discussion but she appears receptive to this  - pending transfer today  - NOT a surgical candidate per Dr. Ankit Ayala     5.  Acute on chronic renal insufficiency  Estimated Creatinine Clearance: 27 mL/min (A) (based on SCr of 1.7 mg/dL (H)). Lab Results   Component Value Date    CREATININE 1.7 (H) 06/19/2018   - cardiorenal syndrome  - per nephrology     6. Acute hepatic injury  - Amio stopped  - LFTs stabilizing post removal of amio and more aggressive CHF mgmt  Lab Results   Component Value Date     (H) 06/19/2018    AST 56 (H) 06/19/2018    ALKPHOS 52 06/19/2018    BILITOT 0.6 06/19/2018     7. Pulmonic valve mass  - chronic  - defer to cardiology    Recommendations:    1. Continue aggressive CHF mgmt medically per cardiology  2. Transfer for Adriana-Clip evaluation  3. Continue Eliquis given recent pharmacolgic conversion, but now back in AF  4. Ok to resume low dose BB for now  5. Will need evaluation for CRT-P vs. CRT-D once decision/timing of Adriana-clip decided upon and she decides if she is WILLING to have ICD therapy or not.     Leonel Evans MD, [facility]  Cardiac Electrophysiology  [facility]) Physicians  [facility] [place]: [facility]  9:31 AM  6/19/2018

## 2018-06-19 NOTE — PROGRESS NOTES
Advanced Heart Failure and Pulmonary Hypertension  Inpatient Progress Note          CC/ID: patient with long standing NICM, LVEF 20%, LVEDD 81 mm April 2017 on TTE (!!!), severe functional MR, recurrent ADHF, acute on chronic renal insufficiency, here with ADHF, worsening SHIELA due to ongoing cardiorenal/congestive nephropathy, bradycardia inhibiting optimization of GDMT, history tachy teresa, no intracardiac device, LBBB, ? pulmonic valve mass, HTN, DL, anemia on iron supplementation, hypothyroidism. 24-hour events/subjective:  - Improvement in the symptoms  - Improvement in kidney function, with total of 2.3. Her urine output last 24 hours  - Net -11.7 L output since admission  - Weight down to 176 pounds from 187 pounds since admission  - Stable vitals overnight, with controlled blood pressure  - Downtrending proBNP, today 30,243  - Improvement in liver functions  - Accepted to [facility], has bed, waiting on the transport    Telemetry:  SB, 1st d hb, LBBB, few PVCs, no major eventse    Physical Exam:    /63   Pulse 89   Temp 97.8 °F (36.6 °C) (Temporal)   Resp 18   Ht 5' 10\" (1.778 m)   Wt 176 lb 14.4 oz (80.2 kg)   SpO2 98%   BMI 25.38 kg/m²   Wt Readings from Last 3 Encounters:   06/19/18 176 lb 14.4 oz (80.2 kg)   06/14/18 198 lb (89.8 kg)   05/29/18 170 lb (77.1 kg)     Appearance: Awake, alert, no acute respiratory distress, appears younger than stated age  Skin: Intact, no rash  Head: Normocephalic, atraumatic  Eyes: EOMI, no conjunctival erythema, anicteric sclerae  ENMT: No pharyngeal erythema, MMM, no rhinorrhea  Neck: Soft, supple, JVD with a JVP 14 cm h20  Lungs: Clear to auscultation bilaterally. No wheezes, rales, or rhonchi.   Cardiac: Regular rate and rhythm, +S1S2, no murmurs apparent  Abdomen: Soft, nontender, +bowel sounds  Extremities: Moves all extremities x 4, bilteral lower extremity edema  Neurologic: No focal motor deficits apparent, normal mood and affect  Peripheral consultation appreciated  -Needs evaluation for mitral valve clipping, would consider that [facility] care center referral likely [facility].      4. Acute on chronic renal insufficiency- Improved  -Creatinine 3.1>>1.9>>1.7  -monitor BUN:Cr, CO2, lytes BID  -hydralazine 12.5 mg TID  -avoid isordil for now given age  -want to avoid significant hypotension so will start afterload reduction low with slow up titration     5. LBBB, no plan for ICD at this point  -EP on board  -CRT - P versus CRT - D, patient to discuss with her family  -timing to be determined based on MV intervention  -decision made to have her evaluated eventually at [facility] by mitral valve team, [facility] accepted and have bed waiting on the transport    6. Hypochromic anemia  -iron studies with iron sat 42%  -defer ferrelicit protocol, can start oral meds    Plan:    1. To transfer to [facility] whenever is ready with the transport  2. To get CRT P/CRT-D after mitral valve clipping    Electronically signed by Jazmine Agrawal on 6/19/2018 at 1:47 PM    Discussed with Dr Sandoval Zurita. I independently performed an evaluation on the patient. I have reviewed the above documentation completed by the advance practitioner. Please see my additional contributions to the HPI, physical exam, assessment and medical decision making. Chung Gutiérrez D.O.   Cardiologist  Cardiology, [facility]

## 2018-06-20 NOTE — PROGRESS NOTES
Transfer line called, unable to obtain transport, informed of [address] giving up bed. Stated they will again on dayturn.

## 2018-06-20 NOTE — PROGRESS NOTES
consider that [facility] care center referral likely [facility].      4. Acute on chronic renal insufficiency- stable  -Creatinine 3.1>>1.9>>1.7>>1.8  -monitor BUN:Cr, CO2, lytes BID  -Currently CO2 trending up, likely from contraction, needs to back on diuresis  -hydralazine 12.5 mg TID  -avoid isordil for now given age  -want to avoid significant hypotension so will start afterload reduction low with slow up titration     5. LBBB, no plan for ICD at this point  -EP on board  -CRT - P versus CRT - D, patient to discuss with her family  -timing to be determined based on MV intervention  -decision made to have her evaluated eventually at [facility] by mitral valve team, [facility] accepted and have bed waiting on the transport    6. Hypochromic anemia  -iron studies with iron sat 14%  -defer ferrelicit protocol, can start oral meds    Plan:    1. To transfer to [facility] whenever is ready with the transport  2. Reduce dose of Bumex to 1 mg daily  3. To get CRT P/CRT-D after mitral valve clipping    Electronically signed by Frida Concepcion on 6/20/2018 at 11:48 AM    Discussed with Dr Mykel Hebert. I independently performed an evaluation on the patient. I have reviewed the above documentation completed by the advance practitioner. Please see my additional contributions to the HPI, physical exam, assessment and medical decision making. Pasquale Hammond D.O.   Cardiologist  Cardiology, [address] [facility]

## 2018-06-20 NOTE — PROGRESS NOTES
Received call from James at transfer line. [facility] transport will be picking up pt at 1200. Pt bed assignment is 1130 bed 1. N2N number provided by James [phone]. Floor RN and  notified.

## 2018-06-20 NOTE — PROGRESS NOTES
31.8*   PLT  155  168  137     BMP:  Recent Labs      06/18/18   0432  06/19/18   0518  06/20/18   0611   NA  137  138  140   K  3.4*  4.1  3.9   CL  97*  96*  98   CO2  30*  29  32*   BUN  45*  42*  43*   CREATININE  1.9*  1.7*  1.8*   CALCIUM  8.4*  8.7  8.6     ABGs: No results found for: PHART, PO2ART, RUF2AAG  INR: No results for input(s): INR in the last 72 hours. BNP: No results for input(s): BNP in the last 72 hours. TSH:   Lab Results   Component Value Date    TSH 3.440 05/08/2018      Cardiac Injury Profile: No results for input(s): CKTOTAL, CKMB, CKMBINDEX, TROPONINI in the last 72 hours. Lipid Profile:   Lab Results   Component Value Date    TRIG 56 04/05/2016    HDL 53 04/05/2016    LDLCALC 74 04/05/2016    CHOL 138 04/05/2016      Hemoglobin A1C: No components found for: HGBA1C     Pertinent Cardiac Testing:   Last Echo: 4/24/2017  Findings      Left Ventricle   Ejection fraction is visually estimated at 35%.   Left ventricle is severely enlarged .   Global hypokinesis.   Normal left ventricular diastolic filling pattern for age.      Right Ventricle   Normal right ventricular size and function.      Left Atrium   The left atrium is moderate-severely dilated.      Right Atrium   Normal right atrium size.      Mitral Valve   Moderate mitral regurgitation is present.   Mild mitral annular calcification.      Tricuspid Valve   Mild tricuspid regurgitation.  RVSP is 40 mmHg.      Aortic Valve   Aortic valve opens well.   Mild aortic regurgitation is noted.      Pulmonic Valve   Mild pulmonic regurgitation present.      Pericardial Effusion   No evidence of pericardial effusion.      Aorta   Aortic root dimension within normal limits.      Miscellaneous   Normal Inferior Vena Cava diameter and respiratory variation      Conclusions      Summary   Ejection fraction is visually estimated at 35%.   Left ventricle is severely enlarged .   Global hypokinesis.      Signature      ----------------------------------------------------------------   Electronically signed by Reinaldo Russell MD(Interpreting physician)   on 04/24/2017 04:20 PM        PRISCILLA 5/29/2018     Findings      Left Ventricle   Ejection fraction is visually estimated at 30%.     Left Atrium   No evidence of thrombus within left atrium.      Right Atrium   No evidence of thrombus or mass in the right atrium.      Mitral Valve   Severe mitral regurgitation is present.      Tricuspid Valve   Mild to moderate tricuspid regurgitation.      Aortic Valve   The aortic valve is trileaflet.   Aortic valve opens well.   Mild aortic regurgitation is noted.      Pulmonic Valve   Pulmonic valve 1.0x1.0 mass - similar to 12/15/16.      Pericardial Effusion   There is no pericardial effusion.      Aorta   Mild plaque.   Miscellaneous   Inferior Vena Cava not well visualized.      Conclusions      Summary   No evidence of thrombus within left atrium.   Severe mitral regurgitation is present.   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      Signature      ----------------------------------------------------------------   Electronically signed by Reinaldo Russell MD(Interpreting physician)   on 05/29/2018 08:10 PM            Last Cath: 5/19/2014  Cath Lab Report [id number] 4/14/2014  1:54 PM Herman Bagley MD   Signed by Herman Bagley MD on 05/19/14 at [address] [place] [address], [address] [place] [address]                                [phone]      PROCEDURE DATE:         INDICATION:  Elevated CPK/MB fraction following a cholecystectomy consistent   with non-STEMI.      PROCEDURE:  (1) Left heart catheterization.  (2) Left ventriculography.  (3)   Coronary angiography.   (4) Selective right common femoral arteriogram.      PROCEDURAL METHOD:  The patient was brought into the cardiac cath lab.  The   procedure was explained and the usual oral and written consents were   obtained.  The right and left groin were prepped and draped in the usual   sterile fashion.  The skin over the right femoral artery was anesthetized   with 1% Xylocaine.  A 5-[place]ankan femoral sheath was introduced via modified   Seldinger technique without any difficulties.   A 5-[place] JL #4, 5-[place] JR   #4, and 5-[place] pigtail catheter were all used to complete the   catheterization.  Over the guidewire, exchange technique was used.  The   patient tolerated the procedure well.  There were no complications.      HEMODYNAMIC DATA:  As per chart.      INTERPRETATION OF HEMODYNAMIC DATA:   1.    The patient was slightly hypertensive during the procedure.   2.    No gradient across the aortic valve.   3.    Elevated left ventricular end diastolic pressure, around 20 mmHg.      INTERPRETATION OF LEFT VENTRICULOGRAPHY:  This was done through a hand   injection.  Only 8 mL _____ were used.  The LV appears to be normal in size   and function.  Ejection fraction around 55% with no evidence of wall motion   abnormalities.      INTERPRETATION OF CORONARY ANGIOGRAPHY:   1.    The left main is a large vessel in length and diameter.  It bifurcates      into both left anterior descending and left circumflex.  There is about      10% to 20% proximal lesion.   2.    The left anterior descending artery is a large vessel, extends and      wraps around the apex and gives rise to a large diagonal branch with some      luminal irregularities but no significant disease.   3.    The left circumflex is a large, nondominant vessel, gives rise to an      early obtuse marginal branch that could be interpreted as a ramus      intermedius.  There are only some luminal irregularities.   4.    The right coronary artery is a huge, dominant vessel with no      significant disease.   5.    Right common femoral is a large vessel with no significant disease.      FINAL IMPRESSION:   1.    Normal LV size and function with estimated ejection fraction of 55%   with no evidence of   wall motion abnormalities.   2.    Normal coronaries with some luminal irregularities.   3.    Elevated left ventricular end diastolic pressure around 20 mmHg.      PLAN:   1.    Continue Toprol for now and may stop aspirin.   2.    Check BNP in 1 week.   3.    Patient will follow up with me within 3 months as an outpatient.   Dictated by: Mae Liu M.D. Telemetry6/20/2018: AF rates 80s-110s    Impression:    1. Acute on chronic systolic CHF  - cardiorenal syndrome  - multiple potential competing causes  - BB on hold 2/2 prior severe bradycardia   - severe MR, EF has been depressed since at least 12/2016 echo  - HF management per Dr. Sobia Amato  - -12L  since admission  - plans for Adriana-clip evaluation PRIOR to CRT implant per Dr. Sobia Amato and Dr. Liset Goldsmith  - markedly improved clinically  - NYHA III, Stage C  - continue medical therapy per Cardiolgy  - transfer today pending     2. LBBB  - based on 2012 CRT guideline update patient has Class I indication for CRT-D  - LBBB with QRS >150ms, EF <=35% and NYHA III symptoms  - Per her discussion with Dr Deluna Him yesterday her regarding her wishes and she still remains modestly uncertain if she would be willing to have ICD shocks, and thus she was told this would be primary consideration when deciding about CRT-P vs. CRT-D  - Will plan for outpatient follow up  post Adriana-Clip to discuss CRT implant     3. Persistent AF  - Amiodarone on hold secondary to bradycardia as well as elevated LFTs.  - CHADSVASC= 6  - Eliquis 2.5 mg BID  - back in AF 6/17/118  - remains in AF-- rates are controlled   - ok to resume low dose BB from EP standpoint if ok with Cardiology from CHF perspective  - clear Tachy/teresa syndrome     4. Severe MR  - see #1  - Appreciate CT surgery evaluation. Plans for transfer/evaluation at [facility] for mitral clip   - pending transfer today  - NOT a surgical candidate per Dr. Ofe Jones     5.  Acute on chronic renal insufficiency  Estimated Creatinine Clearance: 25 mL/min (A) (based

## 2018-06-20 NOTE — PROGRESS NOTES
[facility], [address] [facility], [place], [address] [place] [address] [address]         Phone: [phone]  Fax: [phone] MARKO Mims., [address] [facility] [address], [facility], [address] [facility]   Phone: [phone]  Fax: [phone]    Carlos Sanon M.D. Cherylene Snare, M.D. Erica Mcginnis M.D.                   6/20/2018   Odalis Garcialosser 9/4/1933 6/9 pt seen on ivfs some edema no jvd I can see cr is better and bp better with ivfs   Obviously at risk for chf EF 30% with severe MR   6/10 pt seen resting no c/o but is having some arriaga edema may be a little worse  6/11: Feeling a little better. Still dyspneic with even minimal exertion. Swelling persists, a little worse than this past Friday. Appetite okay. No chest pain or palpitations. Back in NSR  6/12: More dyspneic, though even with ambulating was saturating 97%. Lower extremity edema worse. 6/13: Seen this morning. Laboratory studies not available then. \"Rough night. \"  Dyspnea throughout the night though O2 saturations on pulse ox. Between 95 and 100% on 2 L nasal cannula. Does not feel anxious, she says, though does feel \"nervous. \"  No cough or wheeze chest pain or palpitations. Ongoing fatigue, malaise, generalized weakness. Appetite okay. 6/14: Bradycardic overnight, with HR in the 30s. Hypotensive again. Ongoing fatigue, malaise, dyspnea with even minimal exertion. Swelling a little bit worse. No headache or lightheadedness. No chest pain or palpitations. She is aware that her heart rate is quite low, though.   No nausea or vomiting.    6/15: Transferred to [address] [place] [address] [place] further evaluation as regards surgical intervention on her severe mitral valve regurgitation, evaluation for pacemaker versus AICD, as well as CHF MPV 10.2 06/20/2018     CMP:    Lab Results   Component Value Date     06/20/2018    K 3.9 06/20/2018    K 5.3 06/14/2018    CL 98 06/20/2018    CO2 32 06/20/2018    BUN 43 06/20/2018    CREATININE 1.8 06/20/2018    GFRAA 32 06/20/2018    LABGLOM 27 06/20/2018    GLUCOSE 85 06/20/2018    PROT 6.3 06/20/2018    CALCIUM 8.6 06/20/2018    BILITOT 0.7 06/20/2018    ALKPHOS 46 06/20/2018    AST 40 06/20/2018     06/20/2018     BMP:    Lab Results   Component Value Date     06/20/2018    K 3.9 06/20/2018    K 5.3 06/14/2018    CL 98 06/20/2018    CO2 32 06/20/2018    BUN 43 06/20/2018    CREATININE 1.8 06/20/2018    CALCIUM 8.6 06/20/2018    GFRAA 32 06/20/2018    LABGLOM 27 06/20/2018    GLUCOSE 85 06/20/2018     Ionized Calcium:  No results found for: IONCA  Magnesium:    Lab Results   Component Value Date    MG 1.9 06/20/2018     Phosphorus:    Lab Results   Component Value Date    PHOS 2.7 06/18/2018     U/A:    Lab Results   Component Value Date    COLORU Yellow 05/21/2018    PHUR 7.5 05/21/2018    LABCAST FEW 04/11/2014    WBCUA 1-3 05/21/2018    RBCUA >20 05/21/2018    BACTERIA MANY 05/21/2018    CLARITYU Clear 05/21/2018    SPECGRAV 1.015 05/21/2018    LEUKOCYTESUR SMALL 05/21/2018    UROBILINOGEN 0.2 05/21/2018    BILIRUBINUR Negative 05/21/2018    BLOODU LARGE 05/21/2018    GLUCOSEU Negative 05/21/2018       Hospital Problems:  Patient Active Problem List   Diagnosis    Dilated cardiomyopathy ([facility] [address]    Essential hypertension    Severe mitral regurgitation    Hypothyroidism    CKD (chronic kidney disease) stage 4, GFR 15-29 ml/min ([facility])    Acute on chronic combined systolic and diastolic CHF (congestive heart failure) ([facility])    PAF (paroxysmal atrial fibrillation) ([facility])    Stage 4 chronic kidney disease (HCC)    Chronic anemia    Pleural effusion, bilateral    Persistent atrial fibrillation (HCC)    Atrial fibrillation with rapid ventricular response ([facility])    Congestive heart failure

## 2018-06-20 NOTE — PLAN OF CARE
Problem: Falls - Risk of:  Goal: Will remain free from falls  Will remain free from falls   Outcome: Met This Shift    Goal: Absence of physical injury  Absence of physical injury   Outcome: Met This Shift      Problem: OXYGENATION/RESPIRATORY FUNCTION  Goal: Patient will maintain patent airway  Outcome: Met This Shift    Goal: Patient will achieve/maintain normal respiratory rate/effort  Respiratory rate and effort will be within normal limits for the patient   Outcome: Met This Shift      Problem: HEMODYNAMIC STATUS  Goal: Patient has stable vital signs and fluid balance  Outcome: Met This Shift

## 2018-07-03 PROBLEM — N17.9 AKI (ACUTE KIDNEY INJURY) (HCC): Status: ACTIVE | Noted: 2018-01-01

## 2018-07-03 PROBLEM — K59.00 CONSTIPATION: Status: ACTIVE | Noted: 2018-01-01
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
## 2018-07-03 PROBLEM — R57.9 SHOCK (HCC): Status: ACTIVE | Noted: 2018-01-01

## 2018-07-03 PROBLEM — E87.5 HYPERKALEMIA: Status: ACTIVE | Noted: 2018-01-01

## 2018-07-03 PROBLEM — N39.0 UTI (URINARY TRACT INFECTION): Status: ACTIVE | Noted: 2018-01-01

## 2018-07-03 PROBLEM — J18.9 HOSPITAL ACQUIRED PNA: Status: ACTIVE | Noted: 2018-01-01

## 2018-07-03 PROBLEM — N18.9 ACUTE KIDNEY INJURY SUPERIMPOSED ON CHRONIC KIDNEY DISEASE (HCC): Status: ACTIVE | Noted: 2018-01-01

## 2018-07-03 PROBLEM — R77.8 ELEVATED TROPONIN: Status: ACTIVE | Noted: 2018-01-01

## 2018-07-03 NOTE — TELEPHONE ENCOUNTER
Thank you. I suspect they are under diuresing her there but can't be sure. If she ends up needing re hospitalized needs to come to [facility].

## 2018-07-03 NOTE — PROGRESS NOTES
Ventricle   Ejection fraction is visually estimated at 35%.   Left ventricle is severely enlarged .   Global hypokinesis.   Normal left ventricular diastolic filling pattern for age.      Right Ventricle   Normal right ventricular size and function.      Left Atrium   The left atrium is moderate-severely dilated.      Right Atrium   Normal right atrium size.      Mitral Valve   Moderate mitral regurgitation is present.   Mild mitral annular calcification.      Tricuspid Valve   Mild tricuspid regurgitation. RVSP is 40 mmHg.      Aortic Valve   Aortic valve opens well.   Mild aortic regurgitation is noted.      Pulmonic Valve   Mild pulmonic regurgitation present.      Pericardial Effusion   No evidence of pericardial effusion.      Aorta   Aortic root dimension within normal limits.      Miscellaneous   Normal Inferior Vena Cava diameter and respiratory variation      Conclusions      Summary   Ejection fraction is visually estimated at 35%.   Left ventricle is severely enlarged .   Global hypokinesis.      Signature      ----------------------------------------------------------------   Electronically signed by Bob Parker MD(Interpreting physician)   on 04/24/2017 04:20 PM        PRISCILLA 5/29/2018     Findings      Left Ventricle   Ejection fraction is visually estimated at 30%.       Left Atrium   No evidence of thrombus within left atrium.      Right Atrium   No evidence of thrombus or mass in the right atrium.      Mitral Valve   Severe mitral regurgitation is present.      Tricuspid Valve   Mild to moderate tricuspid regurgitation.      Aortic Valve   The aortic valve is trileaflet.   Aortic valve opens well.   Mild aortic regurgitation is noted.      Pulmonic Valve   Pulmonic valve 1.0x1.0 mass - similar to 12/15/16.      Pericardial Effusion   There is no pericardial effusion.      Aorta   Mild plaque.   Miscellaneous   Inferior Vena Cava not well visualized.      Conclusions      Summary   No evidence of thrombus within left atrium.   Severe mitral regurgitation is present.   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      Signature      ----------------------------------------------------------------   Electronically signed by Nisha Wang MD(Interpreting physician)   on 05/29/2018 08:10 PM            Last Cath: 5/19/2014  Cath Lab Report [id number] 4/14/2014  1:54 PM Tashia De Oliveira MD   Signed by Tashia De Oliveira MD on 05/19/14 at [address] [place] [address]                                [id number]      PROCEDURE DATE:         INDICATION:  Elevated CPK/MB fraction following a cholecystectomy consistent   with non-STEMI.      PROCEDURE:  (1) Left heart catheterization.  (2) Left ventriculography.  (3)   Coronary angiography.  (4) Selective right common femoral arteriogram.      PROCEDURAL METHOD:  The patient was brought into the cardiac cath lab.  The   procedure was explained and the usual oral and written consents were   obtained.  The right and left groin were prepped and draped in the usual   sterile fashion.  The skin over the right femoral artery was anesthetized   with 1% Xylocaine.  A 5-Tanzanian femoral sheath was introduced via modified   Seldinger technique without any difficulties.   A 5-Tanzanian JL #4, 5-Tanzanian JR   #4, and 5-Tanzanian pigtail catheter were all used to complete the   catheterization.  Over the guidewire, exchange technique was used.  The   patient tolerated the procedure well.  There were no complications.      HEMODYNAMIC DATA:  As per chart.      INTERPRETATION OF HEMODYNAMIC DATA:   1.    The patient was slightly hypertensive during the procedure.   2.    No gradient across the aortic valve.   3.    Elevated left ventricular end diastolic pressure, around 20 mmHg.      INTERPRETATION OF LEFT VENTRICULOGRAPHY:  This was done through a hand   injection.  Only 8 mL _____ were used.  The LV appears to be normal in size   and function.  Ejection fraction around 55% with no evidence of wall motion   abnormalities.      INTERPRETATION OF CORONARY ANGIOGRAPHY:   1.    The left main is a large vessel in length and diameter.  It bifurcates      into both left anterior descending and left circumflex.  There is about      10% to 20% proximal lesion.   2.    The left anterior descending artery is a large vessel, extends and      wraps around the apex and gives rise to a large diagonal branch with some      luminal irregularities but no significant disease.   3.    The left circumflex is a large, nondominant vessel, gives rise to an      early obtuse marginal branch that could be interpreted as a ramus      intermedius.  There are only some luminal irregularities.   4.    The right coronary artery is a huge, dominant vessel with no      significant disease.   5.    Right common femoral is a large vessel with no significant disease.      FINAL IMPRESSION:   1.    Normal LV size and function with estimated ejection fraction of 55%   with no evidence of   wall motion abnormalities.   2.    Normal coronaries with some luminal irregularities.   3.    Elevated left ventricular end diastolic pressure around 20 mmHg.      PLAN:   1.    Continue Toprol for now and may stop aspirin.   2.    Check BNP in 1 week.   3.    Patient will follow up with me within 3 months as an outpatient.   Dictated by: Shannan Olvera M.D. [facility] Plan   \"Plan:  Dr. Marsh Client is to reach out to Dr. Rose Mary Faye and discuss BiV pacer and poss AICD. After this is done, if the patient is still doing poorly, we could consider reevaluating her for MitraClip procedure. Today I called and discussed this plan with both the patient who is at [facility] - [phone] is the phone number to her room. I also discussed this plan with Jimena Hernandez her POA/niece. Both verbalized understanding. Pt saw midlevel at Dr. Ebony Hudson office yesterday and she is going to see the patient personally in her office in early July.  They have already discussed PPM with her. \"    EK/3/2018  - AF rate 110, , LBBB, QTc 479    Impression:    1. Acute on chronic systolic CHF  - cardiorenal syndrome  - multiple potential competing causes  - BB on hold 2/2 prior severe bradycardia   - severe MR, EF has been depressed since at least 2016 echo  - HF management per Dr. Duy Mahmood  - NYHA III, Stage C  - continue medical therapy per Cardiolgy  - marked decompensation since last seen  - concern for shock at this time, ? Cardiogenic vs. Mixed septic/cardiogenic     2. LBBB  - based on 2012 CRT guideline update patient has Class I indication for CRT-D  - LBBB with QRS >150ms, EF <=35% and NYHA III symptoms  - she is NOT willing to consider ICD shocks at this time and thus she would prefer CRT-P when she is stable enough to proceed, however she understands that in the setting of shock we would not proceed especially if ANY overt concern of possible infection  - she also understands that she would require either [facility] better rate control (medical or AVJ ablation) or rhythm control to achieve maximum support via CRT     3. Persistent AF  - Amiodarone on hold secondary to bradycardia as well as elevated LFTs.  - CHADSVASC= 6  - Eliquis 2.5 mg BID  - back in AF   - remains in AF-- rates are not well controlled this afternoon  - consider beta-blockers once acute shock has resolved     4. Severe MR  - see #1  - Adriana-Clip NOT recommended as primary therapy (see [address] [facility] notes), only if fails all other therapies  - NOT a surgical candidate per Dr. Josie Sheppard     5. Acute on chronic renal insufficiency  Estimated Creatinine Clearance: 14 mL/min (A) (based on SCr of 3.3 mg/dL (H)).   Lab Results   Component Value Date    CREATININE 3.3 (H) 2018   - cardiorenal syndrome  - per nephrology (Dr. Isrrael Blanchard)     6. Acute hepatic injury  - Amio stopped  - LFTs stabilizing post removal of amio and more aggressive CHF mgmt  Lab Results   Component Value Date     (H) 2018    AST 40 (H) 06/20/2018    ALKPHOS 46 06/20/2018    BILITOT 0.7 06/20/2018     7. Pulmonic valve mass  - chronic  - defer to cardiology    Recommendations:    1. ER evaluation via EMS due to hypotension/shock  2. Discussed with Dr. Juanis Ziegler and ER staff. 3. If/once out of cardiogenic shock could consider CRT P, but also needs understanding for possible repeat attempt at rhythm control vs. AVJ ablation if medical rate control is not sufficient. 4. Continue Eliquis for now  5. I will forward this note to Dr. Libby Chapin and Dr. Aaliyah Irby as well. 45min of face to face time spent with the patient; the majority of this time was spent discussing heart failure, atrial fibrillation, shock, and end of life related issues. Coordination of care with multiple other providers also performed.     Leonel Evans MD, [facility]  Cardiac Electrophysiology  [facility]) Physicians  [facility] [place]: [facility] Electrophysiology  2:22 PM  7/3/2018

## 2018-07-03 NOTE — ED PROVIDER NOTES
thyromegaly present. Cardiovascular: Regular rhythm, S1 normal, S2 normal, normal heart sounds and intact distal pulses. Tachycardia present. Exam reveals no gallop, no S3, no S4, no distant heart sounds and no friction rub. No murmur heard. Pulmonary/Chest: Effort normal and breath sounds normal. No stridor. No respiratory distress. She has no wheezes. She has no rales. She exhibits no tenderness. Abdominal: Soft. Bowel sounds are normal. She exhibits no distension and no mass. There is no tenderness. There is no rebound and no guarding. Musculoskeletal: Normal range of motion. She exhibits no edema, tenderness or deformity. Lymphadenopathy:     She has no cervical adenopathy. Neurological: She is alert and oriented to person, place, and time. Skin: Skin is warm and dry. No rash noted. She is not diaphoretic. No erythema. No pallor. Psychiatric: She has a normal mood and affect. Her behavior is normal. Judgment and thought content normal.   Nursing note and vitals reviewed. Procedures    MDM  The patient presented to the emergency department after being sent in by her cardiologist with report of hypotension, fluid overload, and acute renal failure. The patient was placed on a dobutamine drip and given an IV dose of Bumex at the recommendation of cardiology. Labs and cardiac workup were obtained. Patient was in acute renal failure with a BUN of 79 and a creatinine of 3.5. ProBNP elevated at greater than 70,000. Troponin 0.13. Hemoglobin 9.2. No elevation of white blood cell count. Blood pressure increased to 93/59. The patient was reevaluated multiple times during her emergency department visit. Patient was admitted to medicine. EKG: This EKG is signed and interpreted by me.     Rate: 97  Rhythm: Atrial fibrillation and with Left BBB  Interpretation: atrial fibrillation (chronic)  Comparison: stable as compared to patient's most recent EKG on 6/27.2018    ED Course as of Jul 03 2156   Tue Jul 03, 2018   1603 Spoke with cardiology. She requested a dobutamine drip be started and 1 mg bumex be administered. [LS]   [id number] I spoke with Dr. Caleb Richter. Discussed case. She agrees to accept the patient for admission.  [LS]      ED Course User Index  [LS] Juan Sarmiento DO     ED Course as of Jul 03 2200   Tue Jul 03, 2018   1603 Spoke with cardiology. She requested a dobutamine drip be started and 1 mg bumex be administered. [LS]   [id number] I spoke with Dr. Caleb Richter. Discussed case. She agrees to accept the patient for admission.  [LS]      ED Course User Index  [LS] Juan Sarmiento DO       --------------------------------------------- PAST HISTORY ---------------------------------------------  Past Medical History:  has a past medical history of A-fib [facility]); CHF (congestive heart failure) ([facility] [address] Chronic systolic congestive heart failure (HCC); CKD (chronic kidney disease) stage 4, GFR 15-29 ml/min ([facility] [address] Hypertension; Hypothyroidism; Mitral valve insufficiency; Uses walker; and UTI (urinary tract infection). Past Surgical History:  has a past surgical history that includes Cholecystectomy, laparoscopic (4/11/14); Cardiac catheterization (1997); Cardiac catheterization (4/14/14); Tonsillectomy; other surgical history (12/15/2016); and Cataract removal with implant (Bilateral). Social History:  reports that she has never smoked. She has never used smokeless tobacco. She reports that she drinks alcohol. She reports that she does not use drugs. Family History: family history includes Heart Attack in her father; Heart Disease in her brother and father; Heart Failure in her mother; Heart Surgery in her brother; Pacemaker in her sister. The patients home medications have been reviewed. Allergies: Patient has no known allergies.     -------------------------------------------------- RESULTS -------------------------------------------------    LABS:  Results for orders placed or performed during the hospital Protein,  (A) Negative mg/dL    Urobilinogen, Urine 1.0 <2.0 E.U./dL    Nitrite, Urine Negative Negative    Leukocyte Esterase, Urine MODERATE (A) Negative   CK   Result Value Ref Range    Total CK 41 20 - 180 U/L   Microscopic Urinalysis   Result Value Ref Range    WBC, UA PACKED 0 - 5 /HPF    RBC, UA PACKED 0 - 2 /HPF    Bacteria, UA MANY (A) /HPF   Potassium   Result Value Ref Range    Potassium 5.3 (H) 3.5 - 5.0 mmol/L   EKG 12 Lead   Result Value Ref Range    Ventricular Rate 97 BPM    Atrial Rate 101 BPM    QRS Duration 180 ms    Q-T Interval 410 ms    QTc Calculation (Bazett) 520 ms    R Axis -58 degrees    T Axis 121 degrees       RADIOLOGY:  XR CHEST PORTABLE   Final Result   ALERT:  THIS IS AN ABNORMAL REPORT   1. Stable, enlarged cardiomediastinal silhouette. .   2. Bibasilar infiltrate/pneumonia and bilateral pleural effusion. 3. Vascular calcifications thoracic aorta. 4. Underlying pulmonary edema. ------------------------- NURSING NOTES AND VITALS REVIEWED ---------------------------  Date / Time Roomed:  7/3/2018  3:48 PM  ED Bed Assignment:  2009/7250-G    The nursing notes within the ED encounter and vital signs as below have been reviewed.      Patient Vitals for the past 24 hrs:   BP Temp Temp src Pulse Resp SpO2 Height Weight   07/03/18 2000 (!) 93/59 97.5 °F (36.4 °C) Oral 86 29 97 % 5' 10\" (1.778 m) 180 lb 3.2 oz (81.7 kg)   07/03/18 1902 101/64 97.6 °F (36.4 °C) Oral 80 19 96 % - -   07/03/18 1846 93/61 - - 86 23 100 % - -   07/03/18 1745 95/65 - - 85 22 100 % - -   07/03/18 1717 (!) 92/57 - - 88 21 100 % - -   07/03/18 1701 90/66 - - 88 20 100 % - -   07/03/18 1646 96/64 - - 96 22 100 % - -   07/03/18 1632 92/66 - - 95 20 99 % - -   07/03/18 1615 93/64 - - 91 20 98 % - -   07/03/18 1548 84/62 97.6 °F (36.4 °C) Oral 103 16 98 % 5' 10\" (1.778 m) 181 lb (82.1 kg)       Oxygen Saturation Interpretation: Normal    ------------------------------------------ PROGRESS NOTES

## 2018-07-04 NOTE — H&P
hours as needed for Pain or Fever (temp > 100.4)    Yes Historical Provider, MD   melatonin (RA MELATONIN) 3 MG TABS tablet Take 1 tablet by mouth nightly as needed (insomnia) 6/27/18  Yes CHACHO Peralta CNP   torsemide [facility]) 20 MG tablet Take 2 tablets by mouth daily 6/27/18  Yes CHACHO Peralta CNP   hydrALAZINE (APRESOLINE) 25 MG tablet Take 0.5 tablets by mouth every 8 hours 6/20/18  Yes Adri Harrison MD   potassium chloride (KLOR-CON M) 20 MEQ extended release tablet Take 1 tablet by mouth 2 times daily  Patient taking differently: Take 20 mEq by mouth daily  6/20/18  Yes Adri Harrison MD   Cholecalciferol (VITAMIN D3) 73046 units CAPS Take 1 capsule by mouth every 7 days Mondays   Yes Historical Provider, MD   apixaban (ELIQUIS) 2.5 MG TABS tablet Take 1 tablet by mouth 2 times daily 5/11/18  Yes Say Santiago DO   docusate sodium (COLACE) 100 MG capsule Take 100 mg by mouth 2 times daily as needed for Constipation    Yes Historical Provider, MD   Multiple Vitamins-Minerals (CENTRUM SILVER ULTRA WOMENS) TABS Take 1 tablet by mouth every morning LD 05/23   Yes Historical Provider, MD   Multiple Vitamins-Minerals (ICAPS AREDS 2) CAPS Take 1 capsule by mouth 2 times daily 05/23   Yes Historical Provider, MD   levothyroxine (SYNTHROID) 125 MCG tablet Take 1 tablet by mouth Daily  Patient taking differently: Take 125 mcg by mouth every morning (before breakfast)  2/12/18  Yes Adri Harrison MD       Allergies:  Patient has no known allergies. Social History:      The patient currently lives At the rehab facility. [facility]. TOBACCO:   reports that she has never smoked. She has never used smokeless tobacco.  ETOH:   reports that she drinks alcohol.       Family History:     Reviewed in detail Positive as follows:    Family History   Problem Relation Age of Onset    Heart Failure Mother     Heart Disease Father     Heart Attack Father     Heart Disease Brother    [facility]

## 2018-07-04 NOTE — CONSULTS
cardioversion; pt denies    TONSILLECTOMY           Family History   Problem Relation Age of Onset    Heart Failure Mother     Heart Disease Father     Heart Attack Father     Heart Disease Brother     Heart Surgery Brother     Pacemaker Sister        Social History     Social History    Marital status: Single     Spouse name: N/A    Number of children: N/A    Years of education: N/A     Social History Main Topics    Smoking status: Never Smoker    Smokeless tobacco: Never Used    Alcohol use Yes      Comment: OCCAS GLASS WINE not as of lately    Drug use: No    Sexual activity: No     Other Topics Concern    None     Social History Narrative    None       No Known Allergies        Outpatient Prescriptions Marked as Taking for the 7/3/18 encounter [facility] Encounter)   Medication Sig Dispense Refill    nitrofurantoin (MACRODANTIN) 100 MG capsule Take 100 mg by mouth nightly      bumetanide (BUMEX) 1 MG tablet Take 3 mg by mouth daily      nitrofurantoin, macrocrystal-monohydrate, (MACROBID) 100 MG capsule Take 100 mg by mouth 2 times daily      Dextromethorphan-guaiFENesin  MG/5ML SYRP Take 10 mLs by mouth every 4 hours as needed for Cough      magnesium hydroxide (MILK OF MAGNESIA) 400 MG/5ML suspension Take 30 mLs by mouth daily as needed for Constipation      acetaminophen (TYLENOL) 325 MG tablet Take 650 mg by mouth every 4 hours as needed for Pain or Fever (temp > 100.4)       melatonin (RA MELATONIN) 3 MG TABS tablet Take 1 tablet by mouth nightly as needed (insomnia) 30 tablet 3    torsemide (DEMADEX) 20 MG tablet Take 2 tablets by mouth daily 180 tablet 2    hydrALAZINE (APRESOLINE) 25 MG tablet Take 0.5 tablets by mouth every 8 hours 90 tablet 3    potassium chloride (KLOR-CON M) 20 MEQ extended release tablet Take 1 tablet by mouth 2 times daily (Patient taking differently: Take 20 mEq by mouth daily ) 60 tablet 3    Cholecalciferol (VITAMIN D3) 32716 units CAPS Take 1 ms        Assessment/Recommendations:    1. Acute on chronic heart failure with reduced LVEF:  -NYHA FC 3  -JVD  -THERESA  -rising proBNP (disproportionate to rise in BUN:Cr)  -pulmonary edema  -a little cooler, well perfused, cognitively intact  -proBNP QOD  -monitor BUN:Cr, CO2, lytes BID  -lactic acid BID  -increase bumetanide to 2 mg TID given renal dysfunction, may need more diuretic. Will then reassess UOP and lytes. Discussed with Dr. Pat Birmingham yesterday.  -resume hydralazine 12.5 mg TID  -avoid isordil for now given age  -continue dobutamine  -want to avoid significant hypotension so will start afterload reduction low with slow up titration  -daily weights  -strict intake/output  -sodium restrictions           2. NICM:  -ACC/AHA stage D  -LVEDD 81 mm  -severe secondary MR   -April 2017 - normal RV function  --> PRISCILLA reviewed. -LHC in 2016 - normal coronaries  -suboptimal GDMT - tachy teresa, off BB, and CKD so has been off of ACE I/ARB/ARNI  -notably during last hospitalization (recently) when she was optimized, she had controlled rates, no teresa, no tachy at that point, see # 7 below           3. Severe functional MR:  -Evaluated [facility] Mit[facility]Clip at [facility]  -she is a candidate but want CRT in place prior to proceeding to augment LV function           4. Acute on chronic renal insufficiency:  -resume bumetanide   -monitor BUN:Cr, CO2, lytes BID  -lactic acid tonight after diuresis  -hydralazine 12.5 mg TID  -avoid isordil for now given age  -want to avoid significant hypotension so will start afterload reduction low with slow up titration           5. LBBB, no ICD at this point:  -EP on board  -CRT - P versus CRT - D,  Likely CRT-P based on my own discussions with her  -would like to proceed prior to MitraClip to augment LV function   -see # 7 below         6. Hypochromic anemia:  -iron studies with iron sat 37% during recent hospitalization  -defer ferrelicit protocol         7.  Atrial fibrillation  -RVR  -better

## 2018-07-04 NOTE — CONSULTS
azotemia. She has been treated with dobutamine drip, as well. Dose stable. Blood pressure stable. She does not take NSAIDs. She has not started recent course of antibiotics. She said no known exposures to nephrotoxins. The evaluation for the MitraClip did not involve contrast administration. She denies cough wheeze or sputum production fever chills or rigorr lightheadedness or dizziness abdominal pain or cramping diarrhea or constipation melena hematochezia dysuria which are tender bowel bladder habits new skin rash or lesion mental or visual status changes. Past Medical History:   Diagnosis Date    A-fib [facility])     follows with Dr Dell Stroud CHF [facility]congestive heart failure) [facility] [address]     Chronic systolic congestive heart failure ([facility] [address] 1/24/2017     Updating Deprecated Diagnoses    CKD (chronic kidney disease) stage 4, GFR 15-29 ml/min ([facility] [address]     Follows with Dr Kenneth Zavala Hypertension     Hypothyroidism     Mitral valve insufficiency     Uses walker     UTI (urinary tract infection)        Past Surgical History:   Procedure Laterality Date   Delaney Jimenezys Boehringer  4/14/14    by Dr. Lelia Collier, LAPAROSCOPIC  4/11/14    OTHER SURGICAL HISTORY  12/15/2016    cardioversion; pt denies    TONSILLECTOMY         Family History   Problem Relation Age of Onset    Heart Failure Mother     Heart Disease Father     Heart Attack Father     Heart Disease Brother     Heart Surgery Brother     Pacemaker Sister         reports that she has never smoked. She has never used smokeless tobacco. She reports that she drinks alcohol. She reports that she does not use drugs. Allergies:  Patient has no known allergies.     Current Medications:      hydrALAZINE (APRESOLINE) tablet 12.5 mg 3 times per day   bumetanide (BUMEX) injection 2 mg TID   DOBUTamine (DOBUTREX) 1000 mg in dextrose 5 % 250 mL infusion EOSABS 0.03 07/03/2018    BASOSABS 0.01 07/03/2018     CMP:  Lab Results   Component Value Date     07/04/2018    K 5.1 07/04/2018    K 5.3 06/14/2018    CL 94 07/04/2018    CO2 23 07/04/2018    BUN 90 07/04/2018    CREATININE 3.7 07/04/2018    GFRAA 14 07/04/2018    LABGLOM 12 07/04/2018    GLUCOSE 87 07/04/2018    PROT 7.2 07/03/2018    LABALBU 3.0 07/03/2018    CALCIUM 8.5 07/04/2018    BILITOT 0.7 07/03/2018    ALKPHOS 67 07/03/2018    AST 44 07/03/2018    ALT 31 07/03/2018     Ionized Calcium:  No results found for: IONCA  Magnesium:    Lab Results   Component Value Date    MG 2.1 07/04/2018     Phosphorus:    Lab Results   Component Value Date    PHOS 4.2 07/04/2018     U/A:  Lab Results   Component Value Date    COLORU KERVIN 07/03/2018    PHUR 5.0 07/03/2018    LABCAST FEW 04/11/2014    WBCUA PACKED 07/03/2018    RBCUA PACKED 07/03/2018    BACTERIA MANY 07/03/2018    CLARITYU CLOUDY 07/03/2018    SPECGRAV 1.010 07/03/2018    LEUKOCYTESUR MODERATE 07/03/2018    UROBILINOGEN 1.0 07/03/2018    BILIRUBINUR SMALL 07/03/2018    BLOODU LARGE 07/03/2018    GLUCOSEU 100 07/03/2018     Microalbumen/Creatinine ratio:  No components found for: RUCREAT  Iron Saturation:  No components found for: PERCENTFE  TIBC:    Lab Results   Component Value Date    TIBC 217 06/15/2018     FERRITIN:    Lab Results   Component Value Date    FERRITIN 496 06/15/2018        Imaging:  XR CHEST PORTABLE   Final Result   ALERT:  THIS IS AN ABNORMAL REPORT   1. Stable, enlarged cardiomediastinal silhouette. .   2. Bibasilar infiltrate/pneumonia and bilateral pleural effusion. 3. Vascular calcifications thoracic aorta. 4. Underlying pulmonary edema. Assessment  1. Acute kidney injury, likely hemodynamically mediated in the setting of cardiorenal syndrome due to severe mitral valve regurgitation, and nonischemic cardiomyopathy.   Was in normal sinus rhythm on presentation though he might speculate that she has slipped in and out

## 2018-07-05 NOTE — CARE COORDINATION
Social Work/Discharge Planning:  Met with pt and her niece in room. Pt is alert and oriented, H-O-H however. Pt was a recent admission to [facility]. Per pt, she would prefer not to return and is requesting to go to [place] at discharge. Pt states [facility] as first choice, [facility] second and [facility] as distant third choice. Called and made referral to [facility] [address]. Will await decision. Flores Pulley LSW    Per [place](Kayli),they will follow pt for possible admission there when medically ready.   Sandra DUARTE

## 2018-07-05 NOTE — PROGRESS NOTES
[facility] MD Christina Bojorquez MD Hurtis Rein, MD Mark Knack, MD  Progress Note    7/5/2018    SUBJECTIVE:   7/5: Feeling a little better. No dyspnea at rest.  Poor exercise tolerance. Swelling a little worse though did not put on her JOSE hose yet today. No cough or wheeze. No chest pain or palpitations. Dobutamine continues to run. Bumex 2 mg b.i.d. IV Appetite ok    PROBLEM LIST:    Active Problems:    Essential hypertension    Hypothyroidism    Acute on chronic combined systolic and diastolic CHF (congestive heart failure) ([facility])    PAF (paroxysmal atrial fibrillation) ([facility])    Chronic anemia    Cardiogenic shock ([facility])    Cardiorenal syndrome    Acute kidney injury superimposed on chronic kidney disease ([facility] [address]    UTI (urinary tract infection)    Hyperkalemia    Hospital acquired PNA    Elevated troponin    Constipation  Resolved Problems:    * No resolved hospital problems.  *         DIET:    DIET CARDIAC;     MEDS (scheduled):    hydrALAZINE  12.5 mg Oral 3 times per day    bumetanide  2 mg Intravenous TID    sodium chloride flush  10 mL Intravenous 2 times per day    cefepime  1 g Intravenous Q24H    apixaban  2.5 mg Oral BID    vitamin D  2,000 Units Oral Daily    levothyroxine  125 mcg Oral Daily    therapeutic multivitamin-minerals  1 tablet Oral Daily    docusate sodium  100 mg Oral BID       MEDS (infusions):   DOBUTamine 2.5 mcg/kg/min (07/04/18 0657)       MEDS (prn):  sodium chloride flush, magnesium hydroxide, ondansetron, acetaminophen, melatonin    PHYSICAL EXAM:     Patient Vitals for the past 24 hrs:   BP Temp Temp src Pulse Resp SpO2 Weight   07/05/18 1130 104/68 97.8 °F (36.6 °C) Temporal 85 18 97 % -   07/05/18 0940 98/70 - - - - - -   07/05/18 0755 (!) 78/52 97.2 °F (36.2 °C) Temporal 89 18 97 % -   07/05/18 0615 88/66 - - - - - -   07/05/18 0155 - - - - - - 177 lb 14.4 oz (80.7 kg)   07/05/18 0000 102/71 97.8 °F (36.6 °C) Temporal 97 18 97 % -   07/04/18 2020 87/62 - - - - - -   07/04/18 1515 88/65 97.6 °F (36.4 °C) Oral 102 16 97 % -   @      Intake/Output Summary (Last 24 hours) at 07/05/18 1313  Last data filed at 07/05/18 1137   Gross per 24 hour   Intake              986 ml   Output             2125 ml   Net            -1139 ml         Wt Readings from Last 3 Encounters:   07/05/18 177 lb 14.4 oz (80.7 kg)   07/03/18 181 lb (82.1 kg)   07/02/18 181 lb (82.1 kg)       Constitutional:  in no acute distress  Oral: mucus membranes moist  Neck: Soft supple, trachea midline. Sitting. Cardiovascular: S1, S2 regular rhythm, no murmur,or rub  Respiratory: Coarse breath sounds, bilateral basilar crackles. , no wheeze  Gastrointestinal:  Soft, nontender, nondistended, NABS  Ext: 2+ pitting edema, feet warm  Skin: dry, no rash  Neuro: awake, alert, interactive, moves all 4 families. DATA:    Recent Labs      07/03/18   1615  07/04/18   0611   WBC  6.2  5.5   HGB  9.2*  8.6*   HCT  28.7*  26.7*   MCV  96.3  96.4   PLT  145  139     Recent Labs      07/03/18   1615   07/03/18   2332  07/04/18   0611  07/04/18   1740  07/04/18 2010 07/05/18   0739   NA  132   --    --   136  131*  134  135   K  6.1*   < >   --   5.1*  4.5  4.7  4.5   CL  92*   --    --   94*  92*  93*  92*   CO2  24   --    --   23  26  26  28   MG   --    --   2.1  2.1   --    --   2.2   PHOS   --    --    --   4.2   --    --   4.4   BUN  79*   --    --   90*  87*  84*  90*   CREATININE  3.5*   --    --   3.7*  3.5*  3.9*  3.9*   ALT  31   --    --    --    --    --   27   AST  44*   --    --    --    --    --   25   BILITOT  0.7   --    --    --    --    --   0.6   ALKPHOS  67   --    --    --    --    --   59    < > = values in this interval not displayed. Lab Results   Component Value Date    LABPROT 0.2 06/07/2018    LABPROT 0.2 06/07/2018    LABPROT 0.5 (H) 05/21/2018    LABPROT 0.5 05/21/2018       ASSESSMENT:  1.  Acute kidney injury, likely hemodynamically mediated in the setting of cardiorenal syndrome due to severe mitral valve regurgitation, and nonischemic cardiomyopathy. Was in normal sinus rhythm on presentation though he might speculate that she has slipped in and out of atrial fibrillation with RVR. Clinically improving since her arrival, azotemia is nonetheless progressing. 2. Edema, multifactorial, presently due to nonischemic, no adenopathy, severe mitral valve regurgitation, advanced CK D with superimposed SHIELA  3. Anemia due to CKD, infirmity, phlebotomy associated with recent hospitalization  4. Secondary hyperparathyroidism  5. History of hypertension     RECOMMENDATIONS:    1. Continue current diuretic therapy  2. Agree: dobut as per Dr Mary Rivas  3. Avoid nephrotoxins  4. Follow labs, UO  5. We have discussed dialysis in the past as a last resort. As of now, she does not wish to pursue renal replacement therapy should the need arise. That said, she is not totally ruled out yet. We might consider \"quick start\" peritoneal dialysis which she might tolerate from a cardiac standpoint much better.       Electronically signed by Nikolai Monge MD on 7/5/2018 at 1:13 PM

## 2018-07-05 NOTE — PROGRESS NOTES
OF MAGNESIA) 400 MG/5ML suspension 30 mL  30 mL Oral Daily PRN Mikaela Sharpe MD        ondansetron (ZOFRAN) injection 4 mg  4 mg Intravenous Q6H PRN Mikaela Sharpe MD        cefepime (MAXIPIME) 1 g IVPB minibag  1 g Intravenous Q24H Mikaela Sharpe MD   Stopped at 07/04/18 1733    acetaminophen (TYLENOL) tablet 650 mg  650 mg Oral Q6H PRN Mikaela Sharpe MD        apixaban (ELIQUIS) tablet 2.5 mg  2.5 mg Oral BID Mikaela Sharpe MD   2.5 mg at 07/04/18 2020    vitamin D tablet 2,000 Units  2,000 Units Oral Daily Mikaela Sharpe MD        levothyroxine (SYNTHROID) tablet 125 mcg  125 mcg Oral Daily Mikaela Sharpe MD   125 mcg at 07/05/18 3948    melatonin tablet 3 mg  3 mg Oral Nightly PRN Mikaela Sharpe MD        therapeutic multivitamin-minerals 1 tablet  1 tablet Oral Daily Mikaela Sharpe MD   1 tablet at 07/04/18 0836    docusate sodium (COLACE) capsule 100 mg  100 mg Oral BID Mikaela Sharpe MD   100 mg at 07/04/18 2020       Review of systems:   Heart: as above   Lungs: as above   Eyes: denies changes in vision or discharge. Ears: denies changes in hearing or pain. Nose: denies epistaxis or masses   Throat: denies sore throat or trouble swallowing. Neuro: denies numbness, tingling, tremors. Skin: denies rashes or itching. : denies hematuria, dysuria   GI: denies vomiting, diarrhea   Psych: denies mood changed, anxiety, depression. Physical Exam   BP (!) 78/52 Comment: RN notified  Pulse 89   Temp 97.2 °F (36.2 °C) (Temporal)   Resp 18   Ht 5' 10\" (1.778 m)   Wt 177 lb 14.4 oz (80.7 kg)   SpO2 97%   BMI 25.53 kg/m²   Constitutional: Oriented to person, place, and time. No distress. Well developed. Head: Normocephalic and atraumatic. Neck: Neck supple. No hepatojugular reflux. No JVD present. Carotid bruit is not present. No tracheal deviation present. No thyromegaly present. Cardiovascular: Normal rate, regular rhythm, normal heart sounds.   intact

## 2018-07-06 NOTE — PROGRESS NOTES
[facility] MD Desi Bass MD Adron Smock, MD Lynnwood Amy, MD  Progress Note    7/6/2018    SUBJECTIVE:   7/5: Feeling a little better. No dyspnea at rest.  Poor exercise tolerance. Swelling a little worse though did not put on her JOSE hose yet today. No cough or wheeze. No chest pain or palpitations. Dobutamine continues to run. Bumex 2 mg b.i.d. IV Appetite ok  7/6: Feels better. Swelling improving. Breathing per improving better. Sitting in chair, quite comfortable. Did a little bit of ambulate in the room today. ROS otherwise unremarkable    PROBLEM LIST:    Active Problems:    Essential hypertension    Hypothyroidism    Acute on chronic combined systolic and diastolic CHF (congestive heart failure) ([facility])    PAF (paroxysmal atrial fibrillation) ([facility])    Chronic anemia    Cardiogenic shock ([facility])    Cardiorenal syndrome    Acute kidney injury superimposed on chronic kidney disease ([facility] [address]    UTI (urinary tract infection)    Hyperkalemia    Hospital acquired PNA    Elevated troponin    Constipation  Resolved Problems:    * No resolved hospital problems.  *         DIET:    DIET CARDIAC;     MEDS (scheduled):    hydrALAZINE  12.5 mg Oral 3 times per day    bumetanide  2 mg Intravenous TID    sodium chloride flush  10 mL Intravenous 2 times per day    cefepime  1 g Intravenous Q24H    apixaban  2.5 mg Oral BID    vitamin D  2,000 Units Oral Daily    levothyroxine  125 mcg Oral Daily    therapeutic multivitamin-minerals  1 tablet Oral Daily    docusate sodium  100 mg Oral BID       MEDS (infusions):   DOBUTamine 2.5 mcg/kg/min (07/04/18 0657)       MEDS (prn):  sodium chloride flush, magnesium hydroxide, ondansetron, acetaminophen, melatonin    PHYSICAL EXAM:     Patient Vitals for the past 24 hrs:   BP Temp Temp src Pulse Resp SpO2 Weight   07/06/18 1600 (!) 87/56 97.8 °F (36.6 °C) Oral 86 16 97 % -   07/06/18 1200 86/60 97.4 °F (36.3 °C) Oral 90 16 96 % -   07/06/18 0915 82/62 - - 94 - - -   07/06/18 0745 (!) 80/58 97.4 °F (36.3 °C) Temporal 100 17 95 % -   07/06/18 0630 88/62 - - - - - -   07/06/18 0406 - - - - - - 175 lb 12.8 oz (79.7 kg)   07/06/18 0100 (!) 92/56 98.1 °F (36.7 °C) Temporal 83 18 94 % -   07/05/18 2300 94/66 - - - - - -   07/05/18 2045 (!) 100/58 - - - - - -   @      Intake/Output Summary (Last 24 hours) at 07/06/18 1651  Last data filed at 07/06/18 1450   Gross per 24 hour   Intake             1290 ml   Output             3050 ml   Net            -1760 ml         Wt Readings from Last 3 Encounters:   07/06/18 175 lb 12.8 oz (79.7 kg)   07/03/18 181 lb (82.1 kg)   07/02/18 181 lb (82.1 kg)       Constitutional:  in no acute distress  Oral: mucus membranes moist  Neck: Soft supple, trachea midline. Sitting. Cardiovascular: S1, S2 regular rhythm, no murmur,or rub  Respiratory: Coarse breath sounds, bilateral basilar crackles. , no wheeze  Gastrointestinal:  Soft, nontender, nondistended, NABS  Ext: 2+ pitting edema, feet warm  Skin: dry, no rash  Neuro: awake, alert, interactive, moves all 4 families. DATA:    Recent Labs      07/04/18   0611   WBC  5.5   HGB  8.6*   HCT  26.7*   MCV  96.4   PLT  139     Recent Labs      07/03/18   2332  07/04/18   0611   07/05/18   0739  07/05/18   2032  07/06/18   0658   NA   --   136   < >  135  138  139   K   --   5.1*   < >  4.5  4.4  3.9   CL   --   94*   < >  92*  95*  96*   CO2   --   23   < >  28  27  28   MG  2.1  2.1   --   2.2   --    --    PHOS   --   4.2   --   4.4   --    --    BUN   --   90*   < >  90*  88*  83*   CREATININE   --   3.7*   < >  3.9*  3.6*  3.4*   ALT   --    --    --   27   --   23   AST   --    --    --   25   --   20   BILITOT   --    --    --   0.6   --   0.5   ALKPHOS   --    --    --   59   --   52    < > = values in this interval not displayed.        Lab Results   Component Value Date    LABPROT 0.2 06/07/2018    LABPROT 0.2 06/07/2018    LABPROT 0.5 (H)

## 2018-07-06 NOTE — PROGRESS NOTES
Subjective:  [id number] woman with complicated cardiovascular history  Readmitted with worsening renal failure  Data reviewed in detail  Patient was seen on [facility] earlier this morning  Feels about the same  Tolerating medications well    Objective:    BP (!) 80/58 Comment: manual  Pulse 100   Temp 97.4 °F (36.3 °C) (Temporal)   Resp 17   Ht 5' 10\" (1.778 m)   Wt 175 lb 12.8 oz (79.7 kg)   SpO2 95%   BMI 25.22 kg/m²   Awake alert oriented  No distress  Looks pale  Oral mucosa moist  Heart: irregular with pansystolic murmur at the apex  Lungs: few basal rales  Abd: bowel sounds present, nontender, nondistended, no masses  Extrem:  No clubbing, cyanosis, and peripheral edema is better    Data reviewed in detail    Assessment:  Worsening renal functions in a patient dilated cardiomyopathy and severe mitral regurgitation  E coli UTI  comorbidities as listed below  Patient Active Problem List   Diagnosis    Dilated cardiomyopathy ([address]    Essential hypertension    Severe mitral regurgitation    Hypothyroidism    CKD (chronic kidney disease) stage 4, GFR 15-29 ml/min ([facility])    Acute on chronic combined systolic and diastolic CHF (congestive heart failure) ([facility])    PAF (paroxysmal atrial fibrillation) ([facility])    Stage 4 chronic kidney disease ([facility])    Chronic anemia    Pleural effusion, bilateral    Persistent atrial fibrillation ([facility])    Atrial fibrillation with rapid ventricular response ([facility])    Congestive heart failure due to valvular disease ([facility])    Cardiogenic shock ([facility])    Sinus node dysfunction ([facility])    LBBB (left bundle branch block)    Cardiorenal syndrome    Left bundle branch block    Myocardiopathy ([address]    Shock ([address]    Acute kidney injury superimposed on chronic kidney disease ([address]    UTI (urinary tract infection)    Hyperkalemia    Hospital acquired PNA    Elevated troponin    Constipation       Plan:  Spoke with the patient at length  Consultants input noted  Proceed with

## 2018-07-06 NOTE — PROGRESS NOTES
[id number] [facility] Cardiology Inpatient Progress Note    Patient is a 80 y.o. female of Ceci Mitchell MD seen in hospital follow up. Chief complaint: CHF    HPI: Some mild SOB. No CP.      Patient Active Problem List   Diagnosis    [facility]ilated [facility]    Essential hypertension    Severe mitral regurgitation    Hypothyroidism    CKD (chronic kidney disease) stage 4, GFR 15-29 ml/min ([facility])    Acute on chronic combined systolic and diastolic CHF (congestive heart failure) ([facility])    PAF (paroxysmal atrial fibrillation) ([facility])    Stage 4 chronic kidney disease ([facility])    Chronic anemia    Pleural effusion, bilateral    Persistent atrial fibrillation ([facility])    Atrial fibrillation with rapid ventricular response ([facility])    Congestive heart failure due to valvular disease ([facility]    Cardiogenic shock ([facility])    Sinus node dysfunction ([facility])    LBBB (left bundle branch block)    Cardiorenal syndrome    Left bundle branch block    Myocardiopathy ([facility]    Shock ([facility]    Acute kidney injury superimposed on chronic kidney disease ([facility]    UTI (urinary tract infection)    Hyperkalemia    Hospital acquired PNA    Elevated troponin    Constipation       No Known Allergies    Current Facility-Administered Medications   Medication Dose Route Frequency Provider Last Rate Last Dose    hydrALAZINE (APRESOLINE) tablet 12.5 mg  12.5 mg Oral 3 times per day Isak Constantino MD   12.5 mg at 07/05/18 2302    bumetanide (BUMEX) injection 2 mg  2 mg Intravenous TID Isak Constantino MD   2 mg at 07/06/18 0814    DOBUTamine (DOBUTREX) 1000 mg in dextrose 5 % 250 mL infusion  2.5 mcg/kg/min Intravenous Continuous Irma Eth, DO 3.1 mL/hr at 07/04/18 0657 2.5 mcg/kg/min at 07/04/18 0657    sodium chloride flush 0.9 % injection 10 mL  10 mL Intravenous 2 times per day Sarah Almanza MD   10 mL at 07/06/18 0815    sodium chloride flush 0.9 % injection 10 mL  10 mL Intravenous PRN Aleda Savoy, MD Marylu Hamman

## 2018-07-07 NOTE — PLAN OF CARE
Problem: Falls - Risk of:  Goal: Will remain free from falls  Will remain free from falls   Outcome: Met This Shift      Problem: Cardiac Output - Decreased:  Goal: Hemodynamic stability will improve  Hemodynamic stability will improve   Outcome: Met This Shift      Problem: Fluid Volume - Imbalance:  Goal: Absence of imbalanced fluid volume signs and symptoms  Absence of imbalanced fluid volume signs and symptoms   Outcome: Not Met This Shift      Comments: Giuliana Zambrano RN

## 2018-07-08 NOTE — PROGRESS NOTES
length  Consultants input noted  Proceed with biventricular pacing  Otherwise continue current medical management         Red Johnson  12:07 PM  7/8/2018

## 2018-07-08 NOTE — PROGRESS NOTES
Allergies    Current Medications:  Current Facility-Administered Medications   Medication Dose Route Frequency Provider Last Rate Last Dose    hydrALAZINE (APRESOLINE) tablet 12.5 mg  12.5 mg Oral 3 times per day Janeth Reagan MD   12.5 mg at 07/05/18 2302    bumetanide (BUMEX) injection 2 mg  2 mg Intravenous TID Janeth Reagan MD   2 mg at 07/07/18 1952    sodium chloride flush 0.9 % injection 10 mL  10 mL Intravenous 2 times per day Fam Horn MD   10 mL at 07/07/18 1952    sodium chloride flush 0.9 % injection 10 mL  10 mL Intravenous PRN Fam Horn MD   10 mL at 07/07/18 1354    magnesium hydroxide (MILK OF MAGNESIA) 400 MG/5ML suspension 30 mL  30 mL Oral Daily PRN Fam Horn MD        ondansetron (ZOFRAN) injection 4 mg  4 mg Intravenous Q6H PRN Fam Horn MD        cefepime (MAXIPIME) 1 g IVPB minibag  1 g Intravenous Q24H Fam Horn MD   Stopped at 07/07/18 1945    acetaminophen (TYLENOL) tablet 650 mg  650 mg Oral Q6H PRN Fam Horn MD        apixaban (ELIQUIS) tablet 2.5 mg  2.5 mg Oral BID Fam Horn MD   2.5 mg at 07/07/18 1952    vitamin D tablet 2,000 Units  2,000 Units Oral Daily Fam Horn MD   2,000 Units at 07/07/18 0941    levothyroxine (SYNTHROID) tablet 125 mcg  125 mcg Oral Daily Fam Horn MD   125 mcg at 07/08/18 0631    melatonin tablet 3 mg  3 mg Oral Nightly PRN Fam Horn MD        therapeutic multivitamin-minerals 1 tablet  1 tablet Oral Daily Fam Horn MD   1 tablet at 07/07/18 0941    docusate sodium (COLACE) capsule 100 mg  100 mg Oral BID Fam Horn MD   100 mg at 07/07/18 1952         Physical Exam:  BP (!) 82/55   Pulse 86   Temp 97.5 °F (36.4 °C) (Oral)   Resp 20   Ht 5' 10\" (1.778 m)   Wt 172 lb 14.4 oz (78.4 kg)   SpO2 96%   BMI 24.81 kg/m²   Weight change: -11.2 oz (-0.318 kg)  Wt Readings from Last 3 Encounters:   07/08/18 172 lb 14.4 oz (78.4 kg)   07/03/18

## 2018-07-08 NOTE — PROGRESS NOTES
melatonin    PHYSICAL EXAM:     Patient Vitals for the past 24 hrs:   BP Temp Temp src Pulse Resp SpO2 Weight   07/08/18 1346 87/66 - - - - - -   07/08/18 1345 (!) 79/66 - - - - - -   07/08/18 0817 (!) 82/55 97.5 °F (36.4 °C) Oral 86 20 96 % -   07/08/18 0630 (!) 84/58 - - - - - -   07/08/18 0420 - - - - - - 172 lb 14.4 oz (78.4 kg)   07/08/18 0000 92/64 97.4 °F (36.3 °C) Oral 74 18 97 % -   07/07/18 2044 (!) 85/58 - - - - - -   07/07/18 1615 86/62 97.7 °F (36.5 °C) Oral 101 20 99 % -   @      Intake/Output Summary (Last 24 hours) at 07/08/18 1459  Last data filed at 07/08/18 1420   Gross per 24 hour   Intake             1040 ml   Output             2250 ml   Net            -1210 ml         Wt Readings from Last 3 Encounters:   07/08/18 172 lb 14.4 oz (78.4 kg)   07/03/18 181 lb (82.1 kg)   07/02/18 181 lb (82.1 kg)       Constitutional:  in no acute distress  Oral: mucus membranes moist  Neck: Soft supple, trachea midline. Sitting. Cardiovascular: S1, S2 regular rhythm, no murmur,or rub  Respiratory: Coarse breath sounds, bilateral basilar crackles. , no wheeze  Gastrointestinal:  Soft, nontender, nondistended, NABS  Ext: 1/2+ pitting edema, feet warm  Skin: dry, no rash  Neuro: awake, alert, interactive, moves all 4 families. DATA:    No results for input(s): WBC, HGB, HCT, MCV, PLT in the last 72 hours. Recent Labs      07/06/18   0658   07/07/18 0424 07/07/18   2004  07/08/18   0536   NA  139   < >  136  135  135   K  3.9   < >  3.9  4.1  3.9   CL  96*   < >  94*  92*  90*   CO2  28   < >  31*  29  31*   MG   --    --    --    --   2.1   BUN  83*   < >  87*  78*  79*   CREATININE  3.4*   < >  3.0*  2.9*  2.9*   ALT  23   --   22   --   22   AST  20   --   19   --   20   BILITOT  0.5   --   0.5   --   0.5   ALKPHOS  52   --   50   --   50    < > = values in this interval not displayed.        Lab Results   Component Value Date    LABPROT 0.2 06/07/2018    LABPROT 0.2 06/07/2018    LABPROT 0.5 (H)

## 2018-07-09 NOTE — CARE COORDINATION
Social Work/Discharge Planning;  Per [facility] at [facility]'s, they will continue to follow pt for possible admission when medically ready. Earlene Nielsen with pt's niece via phone. Explained we have [facility] still following pt for discharge when medically ready. I also mentioned [facility] to her. She states she does not want pt to go to [facility] here as she has not heard positive things. I explained that pt may not even meet criteria but there are other choices  Should [facility] be    considered. She stated she'd have to check them out and do her \"homework\" before making that decision.    Ena [facility]

## 2018-07-10 NOTE — PROGRESS NOTES
therapeutic multivitamin-minerals  1 tablet Oral Daily    docusate sodium  100 mg Oral BID     PRN Medications:  sodium chloride flush, magnesium hydroxide, ondansetron, acetaminophen, melatonin    No Known Allergies    Wt Readings from Last 3 Encounters:   07/10/18 171 lb 1.6 oz (77.6 kg)   07/03/18 181 lb (82.1 kg)   07/02/18 181 lb (82.1 kg)     Temp Readings from Last 3 Encounters:   07/10/18 97.9 °F (36.6 °C) (Oral)   06/20/18 98.1 °F (36.7 °C) (Temporal)   06/14/18 97.4 °F (36.3 °C) (Oral)     BP Readings from Last 3 Encounters:   07/10/18 90/62   07/03/18 80/60   07/02/18 (!) 97/57     Pulse Readings from Last 3 Encounters:   07/10/18 91   07/03/18 110   07/02/18 90         Intake/Output Summary (Last 24 hours) at 07/10/18 1132  Last data filed at 07/10/18 0810   Gross per 24 hour   Intake             1210 ml   Output             1625 ml   Net             -415 ml       ROS:   Constitutional: Negative for fever, +activity change   HENT: Negative for epistaxis, difficulty swallowing. Eyes: Negative for blurred vision or double vision. Respiratory: Negative for cough, chest tightness, + shortness of breath and wheezing- improving. Cardiovascular: Negative for chest pain, + palpitations and leg swelling. Gastrointestinal: Negative for abdominal pain, heartburn, or blood in stool. Genitourinary: Negative for hematuria, burning. . + carson  Musculoskeletal: Negative for myalgias, stiffness, or swelling. Skin: Negative for rash, pain, or lumps. Neurological: Negative for syncope, seizures, or headaches. Psychiatric/Behavioral: Negative for confusion and agitation. The patient is not nervous/anxious.     PHYSICAL EXAM:  Vitals:    07/10/18 0000 07/10/18 0113 07/10/18 0629 07/10/18 0810   BP: 99/74  88/65 90/62   Pulse: 103   91   Resp: 18   20   Temp: 97.2 °F (36.2 °C)   97.9 °F (36.6 °C)   TempSrc: Temporal   Oral   SpO2: 97%   100%   Weight:  171 lb 1.6 oz (77.6 kg)     Height:       Constitutional: atrium.      Right Atrium   No evidence of thrombus or mass in the right atrium.      Mitral Valve   Severe mitral regurgitation is present.      Tricuspid Valve   Mild to moderate tricuspid regurgitation.      Aortic Valve   The aortic valve is trileaflet.   Aortic valve opens well.   Mild aortic regurgitation is noted.      Pulmonic Valve   Pulmonic valve 1.0x1.0 mass - similar to 12/15/16.      Pericardial Effusion   There is no pericardial effusion.      Aorta   Mild plaque.   Miscellaneous   Inferior Vena Cava not well visualized.      Conclusions      Summary   No evidence of thrombus within left atrium.   Severe mitral regurgitation is present.   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      Signature      ----------------------------------------------------------------   Electronically signed by Marleni Watson MD(Interpreting physician)   on 05/29/2018 08:10 PM            Last Cath: 5/19/2014  Cath Lab Report [id number] 4/14/2014  1:54 PM Angel Ayala MD   Signed by Angel Ayala MD on 05/19/14 at [address] [place] [address], [address] [place] [address]                                [phone]      PROCEDURE DATE:         INDICATION:  Elevated CPK/MB fraction following a cholecystectomy consistent   with non-STEMI.      PROCEDURE:  (1) Left heart catheterization.  (2) Left ventriculography.  (3)   Coronary angiography.  (4) [facility] right common femoral arteriogram.      PROCEDURAL METHOD:  The patient was brought into the cardiac cath lab.  The   procedure was explained and the usual oral and written consents were   obtained.  The right and left groin were prepped and draped in the usual   sterile fashion.  The skin over the right femoral artery was anesthetized   with 1% Xylocaine.  A 5-Canadian femoral sheath was introduced via modified   Seldinger technique without any difficulties.   A 5-Canadian JL #4, 5-Canadian JR   #4, and 5-Canadian pigtail catheter were all used to complete the   catheterization.  Over the

## 2018-07-10 NOTE — PROGRESS NOTES
Daily    levothyroxine  125 mcg Oral Daily    therapeutic multivitamin-minerals  1 tablet Oral Daily    docusate sodium  100 mg Oral BID       MEDS (infusions):      MEDS (prn):  sodium chloride flush, magnesium hydroxide, ondansetron, acetaminophen, melatonin    PHYSICAL EXAM:     Patient Vitals for the past 24 hrs:   BP Temp Temp src Pulse Resp SpO2 Weight   07/10/18 0810 90/62 97.9 °F (36.6 °C) Oral 91 20 100 % -   07/10/18 0629 88/65 - - - - - -   07/10/18 0113 - - - - - - 171 lb 1.6 oz (77.6 kg)   07/10/18 0000 99/74 97.2 °F (36.2 °C) Temporal 103 18 97 % -   07/09/18 2043 (!) 86/46 - - - - - -   07/09/18 1600 86/65 97.4 °F (36.3 °C) Oral 83 18 97 % -   07/09/18 1415 (!) 82/54 - - - - - -   @      Intake/Output Summary (Last 24 hours) at 07/10/18 1156  Last data filed at 07/10/18 0810   Gross per 24 hour   Intake             1210 ml   Output             1625 ml   Net             -415 ml         Wt Readings from Last 3 Encounters:   07/10/18 171 lb 1.6 oz (77.6 kg)   07/03/18 181 lb (82.1 kg)   07/02/18 181 lb (82.1 kg)       Constitutional:  in no acute distress  Oral: mucus membranes moist  Neck: Soft supple, trachea midline. Sitting. Cardiovascular: S1, S2 regular rhythm, no murmur,or rub  Respiratory: CTA bilaterally, decreased air entry bilaterally at the bases  Gastrointestinal:  Soft, nontender, nondistended, NABS  Ext: 1/2+ pitting edema, feet warm. JOSE hose in place  Skin: dry, no rash  Neuro: awake, alert, interactive, moves all 4 families.       DATA:    Recent Labs      07/09/18   0430   WBC  5.3   HGB  9.1*   HCT  26.6*   MCV  94.3   PLT  200     Recent Labs      07/08/18   0536   07/09/18   0430  07/09/18   2041  07/10/18   0734   NA  135   < >  137  134  137   K  3.9   < >  3.7  4.7  4.1   CL  90*   < >  92*  92*  91*   CO2  31*   < >  33*  28  32*   MG  2.1   --   2.1   --    --    PHOS   --    --   4.0   --    --    BUN  79*   < >  87*  77*  84*   CREATININE  2.9*   < >  2.6*  2.7*  2.6*

## 2018-07-10 NOTE — PROGRESS NOTES
mg  12.5 mg Oral 3 times per day Macy Rosales MD   12.5 mg at 07/05/18 2302    bumetanide (BUMEX) injection 2 mg  2 mg Intravenous TID Macy Rosales MD   2 mg at 07/09/18 2042    sodium chloride flush 0.9 % injection 10 mL  10 mL Intravenous 2 times per day John Stewart MD   10 mL at 07/09/18 2042    sodium chloride flush 0.9 % injection 10 mL  10 mL Intravenous PRN John Stewart MD   10 mL at 07/07/18 1354    magnesium hydroxide (MILK OF MAGNESIA) 400 MG/5ML suspension 30 mL  30 mL Oral Daily PRN John Stewart MD   30 mL at 07/09/18 1741    ondansetron (ZOFRAN) injection 4 mg  4 mg Intravenous Q6H PRN John Stewart MD        acetaminophen (TYLENOL) tablet 650 mg  650 mg Oral Q6H PRN John Stewart MD        apixaban (ELIQUIS) tablet 2.5 mg  2.5 mg Oral BID John Stewart MD   2.5 mg at 07/09/18 2042    vitamin D tablet 2,000 Units  2,000 Units Oral Daily John Stewart MD   2,000 Units at 07/09/18 0900    levothyroxine (SYNTHROID) tablet 125 mcg  125 mcg Oral Daily John Stewart MD   125 mcg at 07/10/18 6377    melatonin tablet 3 mg  3 mg Oral Nightly PRN John Stewart MD        therapeutic multivitamin-minerals 1 tablet  1 tablet Oral Daily John Stewart MD   1 tablet at 07/09/18 0900    docusate sodium (COLACE) capsule 100 mg  100 mg Oral BID Jhon Stewart MD   100 mg at 07/09/18 2042         Physical Exam:  BP 88/65   Pulse 103   Temp 97.2 °F (36.2 °C) (Temporal)   Resp 18   Ht 5' 10\" (1.778 m)   Wt 171 lb 1.6 oz (77.6 kg)   SpO2 97%   BMI 24.55 kg/m²   Wt Readings from Last 3 Encounters:   07/10/18 171 lb 1.6 oz (77.6 kg)   07/03/18 181 lb (82.1 kg)   07/02/18 181 lb (82.1 kg)     Appearance: Awake, alert, no acute respiratory distress  Skin: Intact, no rash  Head: Normocephalic, atraumatic  ENMT: MMM, no rhinorrhea  Neck: Supple, no carotid bruits  Lungs: Decreased BS B/L, no wheezing  Cardiac: IRRR, 3/6 systolic murmur > 13838*       Cardiac Tests:  Telemetry findings reviewed: atrial fibrillation, rate 70's    [facility]: 6/18/18 [facility]   Trileaflet aortic valve. Normal gradients. Mild aortic insufficiency.   Severely dilated left atrium.   Concentric hypertrophy. Severe left ventricular systolic dysfunction.   Visually estimated LVEF is 15%. Severe global hypokinesis with regional   variation. Paradoxical septal bounce consistent with intraventricular   conduction delay.   Normal aortic root and ascending aorta.   Mildly thickened and calcified mitral valve leaflets with apical   tethering. Severe mitral regurgitation with central regurgitant jet.   Severe pulmonic insufficiency, central regurgitant jet.   Normal right atrium.   Mildly dilated right ventricle with moderate RV dysfunction, based on free   wall and not TAPSE which is near normal.   Mild tricuspid regurgitation.   Dilated IVC with no collapsibility. Estimated RAP is 8 mm Hg.     RHC: 6/21/18    RA  (a/v/m): ** / 6 / 5 mmHg    RV  (s/d/ed): 49 / 4 / 6 mmHg    PA  (s/d/m): 57 / 22 / 35 mmHg     PWedge (a/v/m): ** / 22 / 20 mmHg    AO  (s/d/m): 123 / 75 / 90 mm hg    Oxygen Values:    HR 59 BPM    PA O2 Saturation 51 %    PA Hemoglobin 11.3 g/dl    SA O2 Saturation 98 %    SA Hemoglobin 11.3 g/dl      Flows and Resistance    Condition: CONDITION 1     Rand Stroke Volume 61.5 mls    Rand Cardiac Output 3.63 l/min    Rand Cardiac Output Index 1.84 l/m/m2    Thermal Stroke Volume 61.4 mls    Thermal Cardiac Output 3.62 l/min    Thermal Cardiac Index 1.84 l/m m2     HRU    PVR Index 652 HRU    Estimated O2 262.01     O2 Difference SA-SV 72.23 cc/min    Pulmonary Flow 3.63 l/min    Pulmonary Flow Index 1.84 l/min    Systemic Vascular Resistance 1875 HRU    Systemic Flow Index 1.84 l/min    Systemic Flow 3.63 l/min    Impression:     RA - normal   PA pressure - moderately elevated   PCWP - elevated (taken at end expiration)   Cardiac output and cardiac index - low   PVR - elevated   SVR - elevated      PRIS[facility]LLA: 6/22/18  Component Name Value Ref Range   BSA 2 m2   MV vena contracta 0.60 cm   MV mean gradient 2 mmHg    ECHO EF RANGE ESTIMATED 12     MV area (cm2) 4.7 cm2   TR max gradient 28 mmHg    RA Pressure 8 mmHg    RV Systolic Pressure 36 mmHg    Left atrium appendage velocity 20.0 cm/sec    Result Impression   Sedation: Fentanyl 12.5mcg and Versed 1mg  · Probe: #12  · Left ventricle is dilated. · The left ventricular systolic function is severely decreased. The left   ventricular ejection fraction is 10 - 14% (normal LVEF is 55%-74%). · The mitral valve leaflets have non-specific thickening. The mitral   leaflets are apically tethered due to dilated cardiomyopathy (papillary   muscle displacement). Posterior mitral leaflet length is 12 mm. · There is severe (4+) mitral regurgitation. ERO is 0.43 cm2. Regurgitant   volume is 64 mL Pulmonary vein systolic flow reversal is present. MV area   ~ 4.7 cm2 by 3D imaging. Mean MV gradient of 2 mm Hg. · Mild (1+) aortic regurgitation is present. · Mild aortic stenosis is present. SULMA by direct planimetry is 1.8 cm2. · There is mild (1+) tricuspid valve regurgitation present. · The right ventricle is dilated. · The right ventricle has a moderately to severely reduced systolic   function. 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 This was noted on outside [facility] and TTE reports dating   back at least as far as 12/2016 and is essentially unchanged. · There is mild to moderate (1-2+) pulmonic valve regurgitation. Impression:  1. Persistent atrial fibrillation with RVR (history of DC CVN on 5/29/18 --> atrial fibrillation) -- currently rate controlled  2. Severe mitral regurgitation  3. Acute on chronic systolic/diastolic CHF  4.  History of HTN -- +hypotension this admission, now off

## 2018-07-10 NOTE — PROGRESS NOTES
Subjective:  73-LUNM-PBS woman with complicated cardiovascular history  Readmitted with worsening renal failure  Data reviewed in detail  Patient was seen on [facility] earlier this morning  Feels about the same  Tolerating medications well    Objective:    BP (!) 88/58   Pulse 91   Temp 98.2 °F (36.8 °C) (Temporal)   Resp 15   Ht 5' 10\" (1.778 m)   Wt 171 lb 1.6 oz (77.6 kg)   SpO2 97%   BMI 24.55 kg/m²   Awake alert oriented  No distress  Looks pale  Oral mucosa moist  Heart: irregular with pansystolic murmur at the apex  Lungs: few basal rales  Abd: bowel sounds present, nontender, nondistended, no masses  Extrem:  No clubbing, cyanosis, and peripheral edema is better    Data reviewed in detail    Assessment:  Worsening renal functions/but much improved with the dobutamine/ in a patient dilated cardiomyopathy and severe mitral regurgitation  E coli UTI  comorbidities as listed below  Patient Active Problem List   Diagnosis    Dilated cardiomyopathy ([address]    Essential hypertension    Acute congestive heart failure (HCC)    Severe mitral regurgitation    Hypothyroidism    CKD (chronic kidney disease) stage 4, GFR 15-29 ml/min ([facility]    Acute on chronic combined systolic and diastolic CHF (congestive heart failure) ([facility])    PAF (paroxysmal atrial fibrillation) ([facility])    Stage 4 chronic kidney disease ([facility])    Chronic anemia    Pleural effusion, bilateral    Persistent atrial fibrillation (HCC)    Atrial fibrillation with rapid ventricular response ([facility])    Congestive heart failure due to valvular disease ([facility])    Cardiogenic shock ([facility])    Sinus node dysfunction ([facility])    LBBB (left bundle branch block)    Cardiorenal syndrome    Left bundle branch block    Nonischemic cardiomyopathy ([address]    Shock ([address]    Acute kidney injury superimposed on chronic kidney disease ([address]    UTI (urinary tract infection)    Hyperkalemia    Hospital acquired PNA    Elevated troponin    Constipation

## 2018-07-11 NOTE — PROGRESS NOTES
Oral BID Fam Horn MD   100 mg at 07/11/18 0858               ASSESSMENT / RECOMMENDATIONS:  1. Acute on chronic heart failure with reduced LVEF  -overall improving  -NYHA FC 3  -JVD  -THERESA  -improving proBNP  -pulmonary edema  -warm, well perfused, cognitively intact  -check proBNP QOD  -monitor BUN:Cr, CO2, lytes BID  -hydralazine 12.5 mg TID  -avoid isordil for now given age  -daily weights  -strict intake/output  -sodium restrictions           2. NICM  -ACC/AHA stage D  -LVEDD 81 mm  -severe secondary MR  -April 2017 - normal RV function  --> PRIS[facility]LLA reviewed. -[facility] in 2016 - normal coronaries  -suboptimal GDMT - tachy teresa, off BB, and CKD so has been off of ACE I/ARB/ARNI  -to undergo CRT P implant tomorrow with Dr. Paulo Vega to augment LV function, hopefully somewhat improve the functional MR, prior to referral back for MitraClip placement at [facility]          3. Severe functional MR  -evaluated at [facility] for MitraClip  -they agree that she would be a good candidate given mixed functional and degenerative valve disease  -however plan to implant CRT P to augment LV function prior to proceeding with the Clip implant in the next several weeks          4. Acute on chronic renal insufficiency  -improving slowly  -monitor BUN:Cr, CO2, lytes qd  -hydralazine 12.5 mg TID  -avoid isordil for now given age  -appreciate discussions with Dr. Avni Mcgee          5. LBBB, no ICD at this point  -EP on board  -CRT - P tomorrow, see above          6. Hypochromic anemia  -iron studies with iron sat 37% 9/47/2267  -defer ferrelicit protocol, can start oral meds         7. Permanent atrial fibrillation  -amiodarone discontinued for now  -CHADSvasc = 6  -on apixaban 2.5 mg BID; can potentially increase to 5 mg BID        Thank you for allowing us to participate in the care of this patient. We will follow as medical course develops. Do not hesitate to contact us with further questions. Jazmin Jennings M.D.   Advanced Heart Failure

## 2018-07-11 NOTE — CARE COORDINATION
Social Work/Discharge Planning;  Spoke with pt and her niece briefly. Per niece, they(physicians) are still planning to do a pacemaker but she states they did not indicate when. They continue to plan for [facility] when medically ready. Will need to confirm availability with [facility] when closer to discharge. Lawrence DUARTE    Per chart plan for pacer insertion tomorrow. Notified [facility] at [facility] as they are following for admission. They anticipate having a room for pt. Will continue to follow.   Lawrence DUARTE

## 2018-07-12 NOTE — PROGRESS NOTES
[facility] Roberto A. Johnette Arts, MD Ramonita Ambrosia, MD Legrand Langdon, MD Pennie Fothergill, MD  Progress Note       7/12/2018    Off the floor, getting pacemaker. Discussed with RN  Vital signs reviewed, hypotensive    Laboratory studies reviewed. Creatinine slightly better      ASSESSMENT:  CC/ID:  80 year old patient with long standing NICM, LVEF 20%, LVEDD 81 mm April 2017 on TTE (!!!), severe functional MR, recurrent ADHF, acute on chronic renal insufficiency, here with ADHF, worsening SHIELA due to ongoing cardiorenal/congestive nephropathy, bradycardia inhibiting optimization of GDMT, history tachy teresa, no intracardiac device, LBBB, ? pulmonic valve mass, HTN, DL, anemia on iron supplementation, hypothyroidism.       1. Acute kidney injury, hemodynamically mediated in the setting of cardiorenal syndrome due to severe mitral valve regurgitation, and nonischemic cardiomyopathy.  Was in normal sinus rhythm on presentation though has slipped in and out of atrial fibrillation with RVR. 2. Chronic Kidney disease, stage IIIB. Baseline creatinine at 1.8-2.1 mg/dL, though she has had many values higher than this in the setting of cardiorenal syndrome  3. Edema, multifactorial, presently due to nonischemic cardiomyopathy, LVEF 20%, severe mitral valve regurgitation, decomp'd CHF, advanced CKD with superimposed SHIELA  4. Anemia due to CKD, infirmity, phlebotomy associated with recent hospitalization. Iron studies 6/15/18 normal  5. Secondary hyperparathyroidism  6. History of hypertension        Net negative so far by ~ 8 L . Cr down, improving slowly . Azotemia stable, still somewhat above baseline  Clinically feels better . LE edema better .      AF, rate controlled        RECOMMENDATIONS:    1. Continue current therapy . 2. Follow labs, UO  3.  Avoid nephrotoxins    Tj Condon MD

## 2018-07-12 NOTE — PROGRESS NOTES
Advanced Heart Failure and Pulmonary Hypertension  Inpatient Progress Note          CC/ID:  80year old patient with long standing NICM, LVEF 20%, LVEDD 81 mm April 2017 on TTE (!!!), severe functional MR, recurrent ADHF, acute on chronic renal insufficiency, here with ADHF, worsening SHIELA due to ongoing cardiorenal/congestive nephropathy, bradycardia inhibiting optimization of GDMT, history tachy teresa, no intracardiac device, LBBB, ? pulmonic valve mass, HTN, DL, anemia on iron supplementation, hypothyroidism. 24-hour events/subjective:  -no acute overnight events  -down for CRT-P  -post procedural hypotension  -awaiting return to floor  -net negative 8.9 L LOS  -UOP 1.3 L (0.7 mL/kg/hour)         Telemetry:  Atrial fibrillation, normal rates         Physical Exam:    BP 89/64   Pulse 89   Temp 97.5 °F (36.4 °C) (Oral)   Resp 18   Ht 5' 10\" (1.778 m)   Wt 173 lb 4.8 oz (78.6 kg)   SpO2 98%   BMI 24.87 kg/m²   Wt Readings from Last 3 Encounters:   07/12/18 173 lb 4.8 oz (78.6 kg)   07/03/18 181 lb (82.1 kg)   07/02/18 181 lb (82.1 kg)     Appearance: Awake, alert, no acute respiratory distress  Skin: Intact, no rash  Head: Normocephalic, atraumatic  Eyes: EOMI, no conjunctival erythema, anicteric sclerae  ENMT: No pharyngeal erythema, MMM, no rhinorrhea  Neck: Soft, supple, JVD with a JVP 12 cm h20  Lungs: Clear to auscultation bilaterally. No wheezes, rales, or rhonchi.   Cardiac: Regular rate and rhythm, +S1S2, no murmurs apparent  Abdomen: Soft, nontender, +bowel sounds  Extremities: Moves all extremities x 4, no lower extremity edema  Neurologic: No focal motor deficits apparent, normal mood and affect  Peripheral Pulses: Intact posterior tibial pulses bilaterally            Laboratory Tests:    Lab Results   Component Value Date    CREATININE 2.5 (H) 07/12/2018    BUN 85 (H) 07/12/2018     07/12/2018    K 4.3 07/12/2018    CL 92 (L) 07/12/2018    CO2 30 (H) 07/12/2018     Lab Results at 07/12/18 1055    midazolam (VERSED) injection    PRN Ayanna Arteaga, APRN - CRNA   1 mg at 07/12/18 2527    propofol injection    Continuous PRN Ayanna Arteaga, APRN - CRNA   Stopped at 07/12/18 1009    0.9 % sodium chloride infusion    Continuous PRN Ayannalesvia Arteaga, APRN - CRNA                   ASSESSMENT / RECOMMENDATIONS:  1. Acute on chronic heart failure with reduced LVEF  -overall improving though post procedurally may requiring different support. Will assess. -NYHA FC 3  -JVD  -THERESA  -improving proBNP  -pulmonary edema  -warm, well perfused, cognitively intact  -check proBNP QOD  -monitor BUN:Cr, CO2, lytes BID  -hydralazine 12.5 mg TID  -avoid isordil for now given age  -daily weights  -strict intake/output  -sodium restrictions           2. NICM  -ACC/AHA stage D  -LVEDD 81 mm  -severe secondary MR  -April 2017 - normal RV function  --> PRISCILLA reviewed. -[facility] in 2016 - normal coronaries  -suboptimal GDMT - tachy teresa, off BB, and CKD so has been off of ACE I/ARB/ARNI  -CRT-P today         3. Severe functional MR  -evaluated at [facility] for MitraClip  -opted for CRT-P first prior to consideration again of MitraClip          4. Acute on chronic renal insufficiency  -improving slowly  -monitor BUN:Cr, CO2, lytes qd  -hydralazine 12.5 mg TID  -avoid isordil for now given age  -appreciate discussions with Dr. Shelley Maria          5. LBBB, no ICD at this point  -EP on board  -CRT - P this morning          6. Hypochromic anemia  -iron studies with iron sat 37% 0/10/6528  -defer ferrelicit protocol, can start oral meds         7. Permanent atrial fibrillation  -amiodarone discontinued for now  -CHADSvasc = 6  -on apixaban 2.5 mg BID; can potentially increase to 5 mg BID        Thank you for allowing us to participate in the care of this patient. We will follow as medical course develops. Do not hesitate to contact us with further questions. Tonya Chahal M.D.   Advanced Heart Failure and

## 2018-07-12 NOTE — ANESTHESIA PRE PROCEDURE
Department of Anesthesiology  Preprocedure Note       Name:  Brett Madrigal   Age:  80 y.o.  :  1933                                          MRN:  [id number]         Date:  2018      Surgeon: Jose Potter  Procedure: icd    Medications prior to admission:   Prior to Admission medications    Medication Sig Start Date End Date Taking?  Authorizing Provider   nitrofurantoin (MACRODANTIN) 100 MG capsule Take 100 mg by mouth nightly   Yes Historical Provider, MD   bumetanide (BUMEX) 1 MG tablet Take 3 mg by mouth daily   Yes Historical Provider, MD   Dextromethorphan-guaiFENesin  MG/5ML SYRP Take 10 mLs by mouth every 4 hours as needed for Cough   Yes Historical Provider, MD   magnesium hydroxide (MILK OF MAGNESIA) 400 MG/5ML suspension Take 30 mLs by mouth daily as needed for Constipation   Yes Historical Provider, MD   acetaminophen (TYLENOL) 325 MG tablet Take 650 mg by mouth every 4 hours as needed for Pain or Fever (temp > 100.4)    Yes Historical Provider, MD   melatonin (RA MELATONIN) 3 MG TABS tablet Take 1 tablet by mouth nightly as needed (insomnia) 18  Yes CHACHO Wilkerson CNP   torsemide [facility]) 20 MG tablet Take 2 tablets by mouth daily 18  Yes CHACHO Wilkerson CNP   hydrALAZINE (APRESOLINE) 25 MG tablet Take 0.5 tablets by mouth every 8 hours 18  Yes Sandy Schmidt MD   potassium chloride (KLOR-CON M) 20 MEQ extended release tablet Take 1 tablet by mouth 2 times daily  Patient taking differently: Take 20 mEq by mouth daily  18  Yes Sandy Schmidt MD   Cholecalciferol (VITAMIN D3) 66854 units CAPS Take 1 capsule by mouth every 7 days    Yes Historical Provider, MD   apixaban (ELIQUIS) 2.5 MG TABS tablet Take 1 tablet by mouth 2 times daily 18  Yes Say Santiago DO   docusate sodium (COLACE) 100 MG capsule Take 100 mg by mouth 2 times daily as needed for Constipation    Yes Historical Provider, MD   Multiple Vitamins-Minerals (CENTRUM SILVER ULTRA therapeutic multivitamin-minerals 1 tablet  1 tablet Oral Daily Mumtaz Khan MD   1 tablet at 07/11/18 0858    docusate sodium (COLACE) capsule 100 mg  100 mg Oral BID Mumtaz Khan MD   100 mg at 07/11/18 7295       Allergies:  No Known Allergies    Problem List:    Patient Active Problem List   Diagnosis Code    Dilated cardiomyopathy ([facility] [address] [address] I42.0    Essential hypertension I10    Acute congestive heart failure (HCC) I50.9    Severe mitral regurgitation I34.0    Hypothyroidism E03.9    CKD (chronic kidney disease) stage 4, GFR 15-29 ml/min ([facility]) N18.4    Acute on chronic combined systolic and diastolic CHF (congestive heart failure) ([facility]) I50.43    PAF (paroxysmal atrial fibrillation) ([facility]) I48.0    Stage 4 chronic kidney disease (HCC) N18.4    Chronic anemia D64.9    Pleural effusion, bilateral J90    Persistent atrial fibrillation ([facility]) I48.1    Atrial fibrillation with rapid ventricular response ([facility]) I48.91    Congestive heart failure due to valvular disease ([facility]) I50.9, I38    Cardiogenic shock ([facility]) R57.0    Sinus node dysfunction ([facility]) I49.5    LBBB (left bundle branch block) I44.7    Cardiorenal syndrome I13.10    Left bundle branch block I44.7    Nonischemic cardiomyopathy ([facility]) I42.8    Shock ([facility] [address] R57.9    Acute kidney injury superimposed on chronic kidney disease ([facility] [address] N17.9, N18.9    UTI (urinary tract infection) N39.0    Hyperkalemia E87.5   [facility] acquired PNA J18.9    Elevated troponin R74.8    Constipation K59.00    Tachy-teresa syndrome ([facility] [address] I49.5       Past Medical History:        Diagnosis Date    A-fib [facility])     follows with Dr Maria L Coleman CHF (congestive heart failure) ([facility] [address]     Chronic systolic congestive heart failure ([facility] [address] 1/24/2017     Updating Deprecated Diagnoses    CKD (chronic kidney disease) stage 4, GFR 15-29 ml/min ([address]     Follows with Dr Tatiana Simons Hypertension     Hypothyroidism     Mitral valve insufficiency     Uses walker     UTI (urinary tract infection)        Past Surgical History:        Procedure Laterality Date   [address]    [address]  4/14/14    by Dr. Joselin Hammer, LAPAROSCOPIC  4/11/14    OTHER SURGICAL HISTORY  12/15/2016    cardioversion; pt denies    TONSILLECTOMY         Social History:    Social History   Substance Use Topics    Smoking status: Never Smoker    Smokeless tobacco: Never Used    Alcohol use Yes      Comment: OCCAS GLASS WINE not as of lately                                Counseling given: Not Answered      Vital Signs (Current):   Vitals:    07/11/18 2107 07/12/18 0030 07/12/18 0438 07/12/18 0541   BP: (!) 88/54 92/60  88/60   Pulse:  84  90   Resp:  14     Temp:  36.4 °C (97.5 °F)     TempSrc:  Oral     SpO2:  99%     Weight:   173 lb 4.8 oz (78.6 kg)    Height:                                                  BP Readings from Last 3 Encounters:   07/12/18 88/60   07/03/18 80/60   07/02/18 (!) 97/57       NPO Status:  > 8hrs                                                                               BMI:   Wt Readings from Last 3 Encounters:   07/12/18 173 lb 4.8 oz (78.6 kg)   07/03/18 181 lb (82.1 kg)   07/02/18 181 lb (82.1 kg)     Body mass index is 24.87 kg/m².     CBC:   Lab Results   Component Value Date    WBC 5.3 07/09/2018    RBC 2.82 07/09/2018    HGB 9.1 07/09/2018    HCT 26.6 07/09/2018    MCV 94.3 07/09/2018    RDW 14.6 07/09/2018     07/09/2018       CMP:   Lab Results   Component Value Date     07/11/2018    K 4.2 07/11/2018    K 5.3 06/14/2018    CL 92 07/11/2018    CO2 29 07/11/2018    BUN 81 07/11/2018    CREATININE 2.6 07/11/2018    GFRAA 21 07/11/2018    LABGLOM 18 07/11/2018    GLUCOSE 135 07/11/2018    PROT 6.5 07/09/2018    CALCIUM 8.5 07/11/2018    BILITOT 0.5 07/09/2018    ALKPHOS 49 07/09/2018    AST 21 07/09/2018    ALT 22 07/09/2018       POC Tests: No

## 2018-07-13 NOTE — PROGRESS NOTES
[facility] MD Kojo Stauffer MD Lise Flake, MD Olympia Lipoma, MD  Progress Note    7/13/2018    SUBJECTIVE:   7/5: Feeling a little better. No dyspnea at rest.  Poor exercise tolerance. Swelling a little worse though did not put on her JOSE hose yet today. No cough or wheeze. No chest pain or palpitations. Dobutamine continues to run. Bumex 2 mg b.i.d. IV Appetite ok  7/6: Feels better. Swelling improving. Breathing per improving better. Sitting in chair, quite comfortable. Did a little bit of ambulate in the room today. ROS otherwise unremarkable  7/7:  Ongoing fatigue malaise poor exercise tolerance. No dyspnea at rest.  Swelling somewhat improved. 7/8: \"Tired, but okay. \". No new complaint. Dobutamine infusion discontinued by Dr. Yary Parrish this morning. No complaint. Good appetite. Swelling a little better than yesterday. 7/9 : seen today , o2/nc . NAD . Feels overall ok . Fernando Kang 7/10: \"I feel good. \"  Appetite improved. No nausea or vomiting. No dyspnea at rest on 2 L nasal cannula. Not yet tried to lie supine. Still an aVF, though rate controlled. Wearing JOSE hose, swelling improved. 7/11: Feeling little better even than yesterday. Ankle swelling improved. No dyspnea at rest.  Still unable to lay supine without feeling uncomfortable. Otherwise no complaint. 7/12: Off the floor, at pacemaker placement  7/13: Status post PPM yesterday. Back in NSR. Feeling a little better. Swelling persists, though JOSE hose have not been put on yet today. No dyspnea at rest.  On nasal cannula. No other complaint. Appetite remains good.     PROBLEM LIST:    Active Problems:    Essential hypertension    Hypothyroidism    Acute on chronic combined systolic and diastolic CHF (congestive heart failure) (HCC)    PAF (paroxysmal atrial fibrillation) (HCC)    Chronic anemia    Cardiogenic shock (HCC)    Cardiorenal syndrome    Acute kidney injury superimposed on nondistended, NABS  Ext: 1+ pitting edema, feet warm  Skin: dry, no rash  Neuro: awake, alert, interactive, moves all 4 families. DATA:    Recent Labs      07/12/18   0843  07/13/18   0838   WBC  7.5  7.4   HGB  9.6*  9.0*   HCT  29.0*  28.3*   MCV  94.8  96.9   PLT  233  207     Recent Labs      07/11/18   0925  07/11/18   1954  07/12/18   0843  07/12/18 2008   NA  136  137  136  133   K  4.3  4.2  4.3  4.0   CL  91*  92*  92*  90*   CO2  28  29  30*  30*   MG  2.3   --   2.4   --    PHOS  3.5   --   3.9   --    BUN  84*  81*  85*  84*   CREATININE  2.6*  2.6*  2.5*  2.4*       Lab Results   Component Value Date    LABPROT 0.2 06/07/2018    LABPROT 0.2 06/07/2018    LABPROT 0.5 (H) 05/21/2018    LABPROT 0.5 05/21/2018       ASSESSMENT:  1. Acute kidney injury, likely hemodynamically mediated in the setting of cardiorenal syndrome due to severe mitral valve regurgitation, and nonischemic cardiomyopathy. Was in normal sinus rhythm on presentation though he might speculate that she has slipped in and out of atrial fibrillation with RVR. Clinically improving since her arrival, azotemia is nonetheless progressing. 2. Chronic Kidney disease, stage IIIB. Baseline creatinine at 1.8-2.1 mg/dL, though she has had many values higher than this in the setting of cardiorenal syndrome  3. Edema, multifactorial, presently due to nonischemic, no adenopathy, severe mitral valve regurgitation, advanced CK D with superimposed SHIELA  4. Anemia due to CKD, infirmity, phlebotomy associated with recent hospitalization. Iron studies 6/15/18 normal  5. Secondary hyperparathyroidism  6. History of hypertension       Net negative so far by> 9 L   Cr Continues to slowly . Clinically feels better . LE edema better . NSR restored, status post PPM 7/12      RECOMMENDATIONS:    1. Continue current therapy . 2. Follow labs, UO  3. Avoid nephrotoxins  4. Acute hemodialysis still not warranted.       Electronically signed by Compa Franz

## 2018-07-13 NOTE — PROGRESS NOTES
Psychiatric/Behavioral: Negative for confusion and agitation. The patient is not nervous/anxious. PHYSICAL EXAM:  Vitals:    07/13/18 0429 07/13/18 0630 07/13/18 0943 07/13/18 0945   BP:  (!) 95/54 (!) 82/40 (!) 82/40   Pulse:   89    Resp:   16    Temp:   98.1 °F (36.7 °C)    TempSrc:   Temporal    SpO2:       Weight: 175 lb (79.4 kg)      Height:       Constitutional: Oriented to person, place, and time. Well-developed and cooperative. Head: Normocephalic and atraumatic. Eyes: Conjunctivae are normal.   Neck: No hepatojugular reflux and JVD. Cardiovascular: S1 normal, S2 normal and intact distal pulses. III/VI at apex. An irregular rhythm present. Pulmonary/Chest: Effort normal and breath sounds diminished at the bases but otherwise clear. No respiratory distress. (+) O2 via NC  Abdominal: Soft. Normal appearance and bowel sounds are normal. There is no hepatomegaly. No tenderness. Musculoskeletal: Normal range of motion of all extremities, no muscle weakness. Neurological: Alert and oriented to person, place, and time. Skin: Skin is warm and dry. No bruising, no ecchymosis and no rash noted. Extremity: No clubbing or cyanosis. +1-2 THERESA. + JOES hose. Psychiatric: Normal mood and affect. Thought content normal.     Pertinent Labs:  CBC:   Recent Labs      07/12/18   0843  07/13/18   0838   WBC  7.5  7.4   HGB  9.6*  9.0*   HCT  29.0*  28.3*   PLT  233  207     BMP:  Recent Labs      07/12/18   0843  07/12/18 2008 07/13/18   0838   NA  136  133  133   K  4.3  4.0  4.1   CL  92*  90*  89*   CO2  30*  30*  32*   BUN  85*  84*  80*   CREATININE  2.5*  2.4*  2.4*   CALCIUM  8.9  8.4*  8.6     ABGs: No results found for: PHART, PO2ART, BPF7RNZ  INR:   Recent Labs      07/12/18   0915   INR  1.5     BNP: No results for input(s): BNP in the last 72 hours.    TSH:   Lab Results   Component Value Date    TSH 3.440 05/08/2018      Cardiac Injury Profile: No results for input(s): CKTOTAL, CKMB,

## 2018-07-13 NOTE — PROGRESS NOTES
motor deficits apparent, normal mood and affect  Peripheral Pulses: Intact posterior tibial pulses bilaterally            Laboratory Tests:    Lab Results   Component Value Date    CREATININE 2.4 (H) 07/13/2018    BUN 80 (H) 07/13/2018     07/13/2018    K 4.1 07/13/2018    CL 89 (L) 07/13/2018    CO2 32 (H) 07/13/2018     Lab Results   Component Value Date    MG 2.2 07/13/2018     Lab Results   Component Value Date    WBC 7.4 07/13/2018    HGB 9.0 (L) 07/13/2018    HCT 28.3 (L) 07/13/2018    MCV 96.9 07/13/2018     07/13/2018     Lab Results   Component Value Date    CKTOTAL 41 07/03/2018    CKMB 2.4 06/07/2018    TROPONINI 0.11 (H) 07/04/2018     Lab Results   Component Value Date    INR 1.5 07/12/2018    INR 1.7 06/07/2018    INR 1.9 05/29/2018    PROTIME 16.8 (H) 07/12/2018    PROTIME 18.8 (H) 06/07/2018    PROTIME 20.6 (H) 05/29/2018     Lab Results   Component Value Date    ALT 22 07/09/2018    AST 21 07/09/2018    ALKPHOS 49 07/09/2018    BILITOT 0.5 07/09/2018     Lab Results   Component Value Date    TSH 3.440 05/08/2018     Lab Results   Component Value Date    LABA1C SEE NOTE (AA) 05/21/2018     No results found for: EAG  Lab Results   Component Value Date    CHOL 138 04/05/2016     Lab Results   Component Value Date    TRIG 56 04/05/2016     Lab Results   Component Value Date    HDL 53 04/05/2016     Lab Results   Component Value Date    LDLCALC 74 04/05/2016     No results found for: LABVLDL, VLDL  Lab Results   Component Value Date    CHOLHDLRATIO 2.6 04/05/2016             Diagnostics:   7/3/2018 TTE -   Trileaflet aortic valve. Normal gradients. Mild aortic insufficiency.   Severely dilated left atrium.   Concentric hypertrophy. Severe left ventricular systolic dysfunction.   Visually estimated LVEF is 15%. Severe global hypokinesis with regional   variation.  Paradoxical septal bounce consistent with intraventricular   conduction delay.   Normal aortic root and ascending aorta.  Josie Bazzi as medical course develops. Do not hesitate to contact us with further questions. Eli Meier M.D.   Advanced Heart Failure and Pulmonary Hypertension  Mobile [phone]

## 2018-07-14 NOTE — PROGRESS NOTES
07/14/18 0857    sodium chloride flush 0.9 % injection 10 mL  10 mL Intravenous PRN Malathi Skinner MD   10 mL at 07/12/18 2119    acetaminophen (TYLENOL) tablet 650 mg  650 mg Oral Q4H PRN Malathi Skinner MD        [START ON 7/16/2018] vitamin D tablet 2,000 Units  2,000 Units Oral Weekly Malathi Skinner MD        polyethylene glycol (GLYCOLAX) packet 17 g  17 g Oral Daily Patricia Mcwilliams MD        darbepoetin seth-polysorbate [facility]) injection 60 mcg  60 mcg Subcutaneous Weekly - Thursday Rossy Velazquez MD   60 mcg at 07/12/18 1335    hydrALAZINE (APRESOLINE) tablet 12.5 mg  12.5 mg Oral 3 times per day Collette Gent, MD   12.5 mg at 07/13/18 5681    magnesium hydroxide (MILK OF MAGNESIA) 400 MG/5ML suspension 30 mL  30 mL Oral Daily PRN David Gates MD   30 mL at 07/09/18 1741    ondansetron (ZOFRAN) injection 4 mg  4 mg Intravenous Q6H PRN David Gates MD        levothyroxine (SYNTHROID) tablet 125 mcg  125 mcg Oral Daily David Gates MD   125 mcg at 07/14/18 0655    melatonin tablet 3 mg  3 mg Oral Nightly PRN David Gates MD        therapeutic multivitamin-minerals 1 tablet  1 tablet Oral Daily David Gates MD   1 tablet at 07/14/18 0857    docusate sodium (COLACE) capsule 100 mg  100 mg Oral BID David Gates MD   100 mg at 07/14/18 0857       Review of systems:   Heart: as above   Lungs: as above   Eyes: denies changes in vision or discharge. Ears: denies changes in hearing or pain. Nose: denies epistaxis or masses   Throat: denies sore throat or trouble swallowing. Neuro: denies numbness, tingling, tremors. Skin: denies rashes or itching. : denies hematuria, dysuria   GI: denies vomiting, diarrhea   Psych: denies mood changed, anxiety, depression.     Physical Exam   BP (!) 74/48 Comment: manual  Pulse 92   Temp 97.5 °F (36.4 °C) (Oral)   Resp 18   Ht 5' 10\" (1.778 m)   Wt 174 lb 3.2 oz (79 kg)   SpO2 100%   BMI 25.00 kg/m²

## 2018-07-14 NOTE — PROGRESS NOTES
chronic combined systolic and diastolic CHF (congestive heart failure) ([facility])    PAF (paroxysmal atrial fibrillation) ([facility])    Chronic anemia    Cardiogenic shock ([facility])    Cardiorenal syndrome    Acute kidney injury superimposed on chronic kidney disease ([facility]    UTI (urinary tract infection)    Hyperkalemia    Hospital acquired PNA    Elevated troponin    Constipation    Tachy-teresa syndrome ([facility]  Resolved Problems:    * No resolved hospital problems.  *         DIET:    DIET CARDIAC;     MEDS (scheduled):    sodium chloride  300 mL Intravenous Once    apixaban  2.5 mg Oral BID    bumetanide  2 mg Oral BID    sodium chloride flush  10 mL Intravenous 2 times per day    [START ON 7/16/2018] vitamin D  2,000 Units Oral Weekly    polyethylene glycol  17 g Oral Daily    darbepoetin seth-polysorbate  60 mcg Subcutaneous Weekly - Thursday    hydrALAZINE  12.5 mg Oral 3 times per day    levothyroxine  125 mcg Oral Daily    therapeutic multivitamin-minerals  1 tablet Oral Daily    docusate sodium  100 mg Oral BID       MEDS (infusions):      MEDS (prn):  mineral oil-hydrophilic petrolatum, diphenhydrAMINE, sodium chloride flush, acetaminophen, magnesium hydroxide, ondansetron, melatonin    PHYSICAL EXAM:     Patient Vitals for the past 24 hrs:   BP Temp Temp src Pulse Resp SpO2 Weight   07/14/18 1248 (!) 80/58 97.8 °F (36.6 °C) Oral 88 20 100 % -   07/14/18 0845 (!) 74/48 97.5 °F (36.4 °C) Oral 92 18 - -   07/14/18 0655 (!) 74/0 - - - - - 174 lb 3.2 oz (79 kg)   07/14/18 0041 (!) 94/57 98.1 °F (36.7 °C) Temporal 78 16 - -   07/13/18 2315 90/62 - - - - - -   07/13/18 2229 (!) 88/56 - - - - - -   07/13/18 2215 (!) 90/57 98.4 °F (36.9 °C) Temporal 95 16 100 % -   07/13/18 1600 96/70 97.4 °F (36.3 °C) Temporal 89 16 99 % -   @      Intake/Output Summary (Last 24 hours) at 07/14/18 1520  Last data filed at 07/14/18 1406   Gross per 24 hour   Intake              240 ml   Output             1475 ml   Net            -1235 ml         Wt Readings from Last 3 Encounters:   07/14/18 174 lb 3.2 oz (79 kg)   07/03/18 181 lb (82.1 kg)   07/02/18 181 lb (82.1 kg)       Constitutional:  in no acute distress  Oral: mucus membranes moist  Neck: Soft supple, trachea midline. Sitting. Cardiovascular: S1, S2 regular rhythm, no murmur,or rub  Respiratory: CTA bilaterally, decreased air entry bilaterally at the bases -- no change  Gastrointestinal:  Soft, nontender, nondistended, NABS  Ext:edema 1 +  Skin: dry, no rash  Neuro: awake, alert, interactive,       DATA:    Recent Labs      07/12/18 0843  07/13/18   0838   WBC  7.5  7.4   HGB  9.6*  9.0*   HCT  29.0*  28.3*   MCV  94.8  96.9   PLT  233  207     Recent Labs      07/12/18   0843  07/12/18 2008 07/13/18   0838  07/13/18 2002   NA  136  133  133  132   K  4.3  4.0  4.1  4.5   CL  92*  90*  89*  89*   CO2  30*  30*  32*  30*   MG  2.4   --   2.2   --    PHOS  3.9   --    --    --    BUN  85*  84*  80*  83*   CREATININE  2.5*  2.4*  2.4*  2.4*       Lab Results   Component Value Date    LABPROT 0.2 06/07/2018    LABPROT 0.2 06/07/2018    LABPROT 0.5 (H) 05/21/2018    LABPROT 0.5 05/21/2018       ASSESSMENT:  1. Acute kidney injury, likely hemodynamically mediated in the setting of cardiorenal syndrome due to severe mitral valve regurgitation, and nonischemic cardiomyopathy. Was in normal sinus rhythm on presentation though he might speculate that she has slipped in and out of atrial fibrillation with RVR. Clinically improving since her arrival, azotemia is nonetheless progressing. 2. Chronic Kidney disease, stage IIIB. Baseline creatinine at 1.8-2.1 mg/dL, though she has had many values higher than this in the setting of cardiorenal syndrome  3. Edema, multifactorial, presently due to nonischemic, no adenopathy, severe mitral valve regurgitation, advanced CK D with superimposed SHIELA  4. Anemia due to CKD, infirmity, phlebotomy associated with recent hospitalization.   Iron studies

## 2018-07-14 NOTE — PROGRESS NOTES
Subjective:  35-LCOX-LCR woman with complicated cardiovascular history  Data reviewed in detail  Patient was seen on [facility] earlier this morning  Feels about the same  Tolerating medications well  Post op day 2     Objective:    BP 84/60   Pulse 95   Temp 97.8 °F (36.6 °C) (Oral)   Resp 20   Ht 5' 10\" (1.778 m)   Wt 174 lb 3.2 oz (79 kg)   SpO2 100%   BMI 25.00 kg/m²   Awake alert oriented  No distress  Looks pale  Oral mucosa moist  Heart: irregular with pansystolic murmur at the apex  Lungs: few basal rales  Abd: bowel sounds present, nontender, nondistended, no masses  Extrem:  No clubbing, cyanosis, and peripheral edema is better    Data reviewed in detail    Assessment:  Post op day 2 biventricular pacer placement  Worsening renal functions/but much improved with the dobutamine/ in a patient dilated cardiomyopathy and severe mitral regurgitation  E coli UTI/off antibiotics  comorbidities as listed below  Patient Active Problem List   Diagnosis    Dilated cardiomyopathy ([address]    Essential hypertension    Acute congestive [facility] failure ([facility])    Severe mitral regurgitation    Hypothyroidism    CKD (chronic kidney disease) stage 4, GFR 15-29 ml/min ([facility])    Acute on chronic combined systolic and diastolic CHF (congestive heart failure) ([facility])    PAF (paroxysmal atrial fibrillation) ([facility])    Stage 4 chronic kidney disease ([facility])    Chronic anemia    Pleural effusion, bilateral    Persistent atrial fibrillation ([facility])    Atrial fibrillation with rapid ventricular response ([facility])    Congestive heart failure due to valvular disease ([facility])    Cardiogenic shock ([facility])    Sinus node dysfunction ([facility])    LBBB (left bundle branch block)    Cardiorenal syndrome    Left bundle branch block    Nonischemic cardiomyopathy ([address]    Shock ([address]    Acute kidney injury superimposed on chronic kidney disease ([address]    UTI (urinary tract infection)    Hyperkalemia   [facility] acquired PNA    Elevated troponin

## 2018-07-15 NOTE — PLAN OF CARE
Problem: FLUID AND ELECTROLYTE IMBALANCE  Goal: Fluid and electrolyte balance are achieved/maintained  Outcome: Not Met This Shift      Problem: Falls - Risk of:  Goal: Will remain free from falls  Will remain free from falls   Outcome: Met This Shift      Problem: Cardiac Output - Decreased:  Goal: Cardiac output within specified parameters  Cardiac output within specified parameters   Outcome: Not Met This Shift      Problem: Fluid Volume - Imbalance:  Goal: Absence of imbalanced fluid volume signs and symptoms  Absence of imbalanced fluid volume signs and symptoms   Outcome: Not Met This Shift      Problem: Tissue Perfusion, Altered:  Goal: Circulatory function within specified parameters  Circulatory function within specified parameters   Outcome: Met This Shift      Problem:  Bowel/Gastric:  Goal: Ability to achieve a regular elimination pattern will improve  Ability to achieve a regular elimination pattern will improve   Outcome: Met This Shift      Problem: Pain:  Goal: Pain level will decrease  Pain level will decrease   Outcome: Met This Shift

## 2018-07-15 NOTE — PROGRESS NOTES
Ht 5' 10\" (1.778 m)   Wt 175 lb 4.8 oz (79.5 kg)   SpO2 98%   BMI 25.15 kg/m²   Constitutional: Oriented to person, place, and time. No distress. Well developed. Head: Normocephalic and atraumatic. Neck: Neck supple. No hepatojugular reflux. No JVD present. Carotid bruit is not present. No tracheal deviation present. No thyromegaly present. Cardiovascular: Normal rate, regular rhythm, normal heart sounds. intact distal pulses. No gallop and no friction rub. No murmur heard. Pulmonary: Breath sounds normal. No respiratory distress. No wheezes. No rales. Chest: Effort normal. No tenderness. Abdominal: Soft. Bowel sounds are normal. No distension or mass. No tenderness, rebound or guarding. Musculoskeletal: . No tenderness. No clubbing or cyanosis. Extremitites: Intact distal pulses. + edema  Neurological: Alert and oriented to person, place, and time. Skin: Skin is warm and dry. No rash noted. Not diaphoretic. No erythema. Psychiatric: Normal mood and affect. Behavior is normal.   Lymphadenopathy: No cervical adenopathy. No groin adenopathy.       CBC:   Lab Results   Component Value Date    WBC 6.3 07/15/2018    RBC 2.80 07/15/2018    HGB 8.6 07/15/2018    HCT 27.4 07/15/2018    MCV 97.9 07/15/2018    MCH 30.7 07/15/2018    MCHC 31.4 07/15/2018    RDW 14.3 07/15/2018     07/15/2018    MPV 10.4 07/15/2018     BMP:   Lab Results   Component Value Date     07/15/2018    K 4.2 07/15/2018    K 4.1 07/13/2018    CL 91 07/15/2018    CO2 32 07/15/2018    BUN 80 07/15/2018    LABALBU 2.9 07/15/2018    CREATININE 2.7 07/15/2018    CALCIUM 8.6 07/15/2018    GFRAA 20 07/15/2018    LABGLOM 17 07/15/2018     Magnesium:    Lab Results   Component Value Date    MG 2.2 07/13/2018     Cardiac Enzymes:   Lab Results   Component Value Date    CKTOTAL 41 07/03/2018    CKTOTAL 59 06/07/2018    CKTOTAL 72 06/07/2018    CKMB 2.4 06/07/2018    CKMB 3.0 06/07/2018    CKMB 11.3 (H) 04/14/2014    TROPONINI

## 2018-07-15 NOTE — PROGRESS NOTES
Nurse to nurse call to Carl Sal, RN at [facility]. All pertinent information passed along to her and all question answered. Medina catheter pulled at 1230 pt. Due to void by [phone] tonrika.      Betzy Cary, RN

## 2018-07-16 NOTE — CARE COORDINATION
[facility] Update Call    2018    Patient: Annette Garcia Patient : 1933   MRN: [id number]  Reason for Admission:  Acute Kidney Injury Superimposed On Chronic Kidney Disease  Discharge Date: 7/15/18 RARS: Readmission Risk Score: 28    Spoke with Jose Alfredo Wiggins,  at [address] [place] [address] and confirmed patient is currently at the facility. Discussed will be calling Gloria Bishop in [address] [place] [address] for weekly patient progress updates; RN CTC contact information provided.

## 2018-07-19 PROBLEM — I47.20 VENTRICULAR TACHYCARDIA: Status: ACTIVE | Noted: 2018-01-01

## 2018-07-19 NOTE — ED PROVIDER NOTES
The patient is a 30-year-old female with a history of CKD, A. fib, heart failure, hypothyroidism, hypertension, valvular heart disease who presents to the emergency department by ambulance after being sent in from the nursing home with report of shortness of breath and back pain. The patient was in V. tach upon arrival to the emergency department with a rate of 152. The patient was visibly short of breath and tachypneic. Her vitals were otherwise stable. The patient was alert and oriented and reported that her back pain was chronic and the shortness of breath was new. Additionally, the patient had a pacemaker placed one week ago at the left upper chest.            Review of Systems   Constitutional: Negative for activity change, appetite change, chills, diaphoresis, fatigue and fever. HENT: Negative for congestion and sore throat. Eyes: Negative for visual disturbance. Respiratory: Positive for shortness of breath. Negative for cough, wheezing and stridor. Cardiovascular: Positive for palpitations. Negative for chest pain and leg swelling. Gastrointestinal: Negative for abdominal pain, diarrhea, nausea and vomiting. Endocrine: Negative for polyuria. Genitourinary: Negative for difficulty urinating, dysuria, flank pain and hematuria. Musculoskeletal: Negative for arthralgias, back pain, gait problem, joint swelling, myalgias, neck pain and neck stiffness. Skin: Negative for color change, pallor, rash and wound. Allergic/Immunologic: Negative for immunocompromised state. Neurological: Negative for dizziness, tremors, syncope, facial asymmetry, speech difficulty, weakness, light-headedness, numbness and headaches. Hematological: Negative for adenopathy. Does not bruise/bleed easily. Psychiatric/Behavioral: Negative. Physical Exam   Constitutional: She is oriented to person, place, and time. She appears well-developed and well-nourished. She appears distressed.    HENT:   Head: Normocephalic and atraumatic. Right Ear: External ear normal.   Left Ear: External ear normal.   Nose: Nose normal.   Mouth/Throat: Oropharynx is clear and moist. No oropharyngeal exudate. Eyes: Conjunctivae and EOM are normal. Pupils are equal, round, and reactive to light. Right eye exhibits no discharge. Left eye exhibits no discharge. No scleral icterus. Neck: Normal range of motion. Neck supple. No JVD present. No tracheal deviation present. No thyromegaly present. Cardiovascular: Regular rhythm, S1 normal, S2 normal, normal heart sounds and intact distal pulses. Tachycardia present. Exam reveals no gallop, no S3, no S4, no distant heart sounds and no friction rub. No murmur heard. Pulses:       Radial pulses are 2+ on the right side, and 2+ on the left side. Dorsalis pedis pulses are 2+ on the right side, and 2+ on the left side. Pulmonary/Chest: Breath sounds normal. No accessory muscle usage or stridor. No respiratory distress. She has no decreased breath sounds. She has no wheezes. She has no rhonchi. She has no rales. She exhibits no tenderness, no bony tenderness, no laceration, no crepitus, no edema, no deformity, no swelling and no retraction. Tachypnea without any retractions or hypoxia   Abdominal: Soft. Bowel sounds are normal. She exhibits no distension and no mass. There is no tenderness. There is no rebound and no guarding. Musculoskeletal: Normal range of motion. She exhibits no edema, tenderness or deformity. Lymphadenopathy:     She has no cervical adenopathy. Neurological: She is alert and oriented to person, place, and time. No sensory deficit. GCS eye subscore is 4. GCS verbal subscore is 5. GCS motor subscore is 6. Skin: Skin is warm and dry. No rash noted. She is not diaphoretic. No erythema. No pallor. Psychiatric: She has a normal mood and affect.  Her behavior is normal. Judgment and thought content normal.   Nursing note and vitals reviewed. Procedures  Central Line Placement Procedure Note    Indication: vascular access, poor peripheral access, hypovolemia, centrally administered medications and need for frequent blood draws    Consent: The patient provided verbal consent for this procedure. Procedure: The patient was positioned appropriately and the skin over the right femoral vein was prepped with Chloraprep and draped in sterile fashion. Local anesthesia was obtained by infiltration using 1% Lidocaine without epinephrine. A large bore needle was used to identify the vein. A guide wire was then inserted into the vein through the needle. A triple lumen catheter was then inserted into the vessel over the guide wire using the Seldinger technique. All ports showed good, free flowing blood return and were flushed with saline solution. The catheter was then securely fastened to the skin with suture at 19 cm. Two sutures were placed into the proximal eyelets and a suture end from each of the securing sutures was extended around the catheter and tied to the proximal eyelets as an added measure to prevent dislodgement. An antibiotic disk was placed and the site was then covered with a sterile dressing. A post procedure X-ray was not indicated. The patient tolerated the procedure well. Complications: None            MDM  The patient presented to the emergency department with shortness of breath and back pain and she was in V. tach upon arrival. Patient's rate was 150. The patient was immediately placed on telemetry as well as the code cart monitor. Supplemental oxygen was placed. Peripheral IV access was obtained and the right wrist and a right femoral vein central line was placed under ultrasound guidance. Labs were obtained. Patient was given an amiodarone bolus followed by an amiodarone infusion. Patient was also given IV calcium and IV magnesium.  Patient's heart rate did decrease to proximally 110-120 after this intervention map >70 at this time. Patient is alert and oriented talking with family. [LS]    Repeat potassium 5.2  [LS]      0001 Patient gone to the ICU.  [LS]      ED Course User Index  [LS] Claudine Ro DO     EK  This EKG is signed and interpreted by me. Rate: 152  Rhythm: V-Tach  Interpretation: ventricular tachycardia  Comparison: no previous EKG and changes compared to previous EKG on 7/3/2018 (was previously atrial fibrillation with LBB)    EKThis EKG is signed and interpreted by me. Rate: 114  Rhythm: Ventricular paced with runs of V. tach  Interpretation: Ventricular paced with runs of V. tach  Comparison: changes compared to previous EKG as patient is no longer continuously in V. tach although she is having runs of V. tach    ED Course as of 437   Thu 2018   190 I spoke with Maribel Chang and discussed the case. He agrees to accept the patient for admission.  [LS]    I spoke with Dr. Nehemias Arnett and discussed case. He agrees to accept patient for critical care management to the ICU.  [LS]    Family updated and questions answered. They were brought into to the patient briefly and then they went back to the waiting room. [LS]    Patient reassessed. She still in and out of V. tach although her rate is less than 100. Blood pressure is map >70 at this time. Patient is alert and oriented talking with family. [LS]    Repeat potassium 5.2  [LS]      0001 Patient gone to the ICU.  [LS]      ED Course User Index  [LS] Claudine Ro DO       --------------------------------------------- PAST HISTORY ---------------------------------------------  Past Medical History:  has a past medical history of A-fib ([facility] Chronic systolic congestive heart failure (HCC); CKD (chronic kidney disease) stage 4, GFR 15-29 ml/min ([address] Hypertension; Hypothyroidism; Mitral valve insufficiency; Uses walker; and UTI (urinary tract infection).     Past Surgical History:  has a past surgical history that includes Cholecystectomy, laparoscopic (4/11/14); Cardiac catheterization (1997); Cardiac catheterization (4/14/14); Tonsillectomy; other surgical history (12/15/2016); Cataract removal with implant (Bilateral); and pacemaker placement (07/2018). Social History:  reports that she has never smoked. She has never used smokeless tobacco. She reports that she drinks alcohol. She reports that she does not use drugs. Family History: family history includes Heart Attack in her father; Heart Disease in her brother and father; Heart Failure in her mother; Heart Surgery in her brother; Pacemaker in her sister. The patients home medications have been reviewed.     Allergies: Heparin and Vancomycin    -------------------------------------------------- RESULTS -------------------------------------------------    LABS:  Results for orders placed or performed during the hospital encounter of 07/19/18   CBC auto differential   Result Value Ref Range    WBC 4.5 4.5 - 11.5 E9/L    RBC 2.66 (L) 3.50 - 5.50 E12/L    Hemoglobin 8.1 (L) 11.5 - 15.5 g/dL    Hematocrit 26.5 (L) 34.0 - 48.0 %    MCV 99.6 80.0 - 99.9 fL    MCH 30.5 26.0 - 35.0 pg    MCHC 30.6 (L) 32.0 - 34.5 %    RDW 14.7 11.5 - 15.0 fL    Platelets 874 452 - 278 E9/L    MPV 11.2 7.0 - 12.0 fL    Neutrophils % 71.4 43.0 - 80.0 %    Immature Granulocytes % 0.4 0.0 - 5.0 %    Lymphocytes % 23.6 20.0 - 42.0 %    Monocytes % 4.0 2.0 - 12.0 %    Eosinophils % 0.2 0.0 - 6.0 %    Basophils % 0.4 0.0 - 2.0 %    Neutrophils # 3.21 1.80 - 7.30 E9/L    Immature Granulocytes # 0.02 E9/L    Lymphocytes # 1.06 (L) 1.50 - 4.00 E9/L    Monocytes # 0.18 0.10 - 0.95 E9/L    Eosinophils # 0.01 (L) 0.05 - 0.50 E9/L    Basophils # 0.02 0.00 - 0.20 E9/L   Comprehensive Metabolic Panel   Result Value Ref Range    Sodium 135 132 - 146 mmol/L    Potassium 6.9 (HH) 3.5 - 5.0 mmol/L    Chloride 89 (L) 98 - 107 mmol/L    CO2 19 (L) 22 - 29 mmol/L

## 2018-07-20 PROBLEM — I47.20 V TACH: Status: ACTIVE | Noted: 2018-01-01

## 2018-07-20 PROBLEM — R00.0 WIDE-COMPLEX TACHYCARDIA: Status: ACTIVE | Noted: 2018-01-01

## 2018-07-20 PROBLEM — Z95.0 BIVENTRICULAR CARDIAC PACEMAKER IN SITU: Status: ACTIVE | Noted: 2018-01-01

## 2018-07-20 NOTE — PROGRESS NOTES
Subjective:  80-year-old woman seen on ICU earlier this morning  Registered nurse and resident staff at bedside  Data reviewed in detail  Patient has had multiple recent hospitalizations related to CHF A. Fib acute kidney injury  Patient with dilated cardiomyopathy and  Severe functional regurgitation of the mitral valve  Complicated by persistent atrial fibr and chronic kidney disease  Admitted with tachyarrhythmias atrial versus ventricular  Currently on IV lidocaine  In lactic acidosis due to hypoperfusion secondary to cardiorenal syndrome    Objective:    BP 84/69   Pulse 102   Temp 97.6 °F (36.4 °C) (Temporal)   Resp 19   Ht 5' 10\" (1.778 m)   Wt 185 lb 3.2 oz (84 kg)   SpO2 97%   BMI 26.57 kg/m²   Looks pale  Minimal dyspnea  Alert oriented  Oral mucosa moist  Neck no stiffness  Heart:  Irregular and somewhat rapid  Systolic murmur unchanged  Lungs:  Diminished breath sounds  Abd: bowel sounds present, nontender, nondistended, no masses  Extrem:  No clubbing, cyanosis, and edema is rather extensive  Data reviewed in detail    Assessment:  Tachyarrhythmia atrial versus ventricular  Cardiorenal syndrome with hypoperfusion resulting in lactic acidosis  Dilated cardiomyopathy  Severe MR  Persistent A.  Fib  Chronic kidney disease  comorbidities as listed below  Patient Active Problem List   Diagnosis    Dilated cardiomyopathy ([facility]    Essential hypertension    Acute congestive heart failure (HCC)    Severe mitral regurgitation    Hypothyroidism    CKD (chronic kidney disease) stage 4, GFR 15-29 ml/min (HCC)    Acute on chronic combined systolic and diastolic CHF (congestive heart failure) (HCC)    PAF (paroxysmal atrial fibrillation) (HCC)    Stage 4 chronic kidney disease (HCC)    Chronic anemia    Pleural effusion, bilateral    Persistent atrial fibrillation (HCC)    Atrial fibrillation with rapid ventricular response (HCC)    Congestive heart failure due to valvular disease ([address]   

## 2018-07-20 NOTE — FLOWSHEET NOTE
Stage  CO CI HR SV SVI   Baseline 5.5 2.7 92 59.2 29   Challenge 5.6 2.8 93 61.2 30   Result (%Ä) 2 2 .45 3.4 3.4

## 2018-07-20 NOTE — PROGRESS NOTES
[facility] Quality Flow/Interdisciplinary Rounds Progress Note        Quality Flow Rounds held on July 20, 2018    Disciplines Attending:   Bedside Nurse, Case Management, , PT/OT and Nursing Unit Leadership; Intensivist and ICU Team    Bren Fraser was admitted on 7/19/2018  5:41 PM    Anticipated Discharge Date:  Expected Discharge Date: 07/24/18    Disposition:    Aroldo Score:  Aroldo Scale Score: 18    Readmission Risk              Risk of Unplanned Readmission:        35             Discussed patient goal for the day, patient clinical progression, and barriers to discharge.   The following Goal(s) of the Day/Commitment(s) have been identified: Hospice consult for end stage CHF    [facility]  July 20, 2018

## 2018-07-20 NOTE — PROGRESS NOTES
injury superimposed on chronic kidney disease ([facility] [address]    Hyperkalemia    Ventricular tachycardia (HCC)    V tach (HCC)    Wide-complex tachycardia (HCC)    Biventricular cardiac pacemaker in situ  Resolved Problems:    * No resolved hospital problems. *         DIET:    DIET RENAL; Low Sodium (2 GM);  Daily Fluid Restriction: 1000 ml; Low Potassium     MEDS (scheduled):    apixaban  2.5 mg Oral BID    [START ON 7/26/2018] vitamin D  10,000 Units Oral Q7 Days    levothyroxine  125 mcg Oral Daily    sodium chloride flush  10 mL Intravenous 2 times per day    pantoprazole  40 mg Intravenous Daily    bisacodyl  10 mg Rectal Daily    sodium bicarbonate  650 mg Oral BID    [START ON 7/23/2018] darbepoetin seth-polysorbate  60 mcg Subcutaneous Weekly - Monday       MEDS (infusions):   dextrose      DOBUTamine 2.5 mcg/kg/min (07/20/18 0853)    IV infusion builder 50 mL/hr at 07/20/18 0856    lidocaine 1 mg/min (07/19/18 1912)       MEDS (prn):  acetaminophen, mineral oil-hydrophilic petrolatum, sodium chloride flush, glucose, dextrose, glucagon (rDNA), dextrose    PHYSICAL EXAM:     Patient Vitals for the past 24 hrs:   BP Temp Temp src Pulse Resp SpO2 Height Weight   07/20/18 0900 89/72 - - 101 27 98 % - -   07/20/18 0800 103/68 97.8 °F (36.6 °C) Temporal 101 23 99 % - -   07/20/18 0700 111/86 - - 117 26 98 % - -   07/20/18 0630 101/68 - - 97 23 97 % - -   07/20/18 0600 (!) 93/57 - - 93 25 92 % - -   07/20/18 0530 86/65 - - 95 26 97 % - -   07/20/18 0500 92/71 97.8 °F (36.6 °C) Temporal 99 24 99 % - -   07/20/18 0430 84/70 - - 87 25 99 % - -   07/20/18 0400 90/73 - - 86 26 97 % - -   07/20/18 0330 82/64 - - 98 25 100 % - -   07/20/18 0300 (!) 94/58 - - 91 23 99 % - -   07/20/18 0230 82/66 - - 99 25 99 % - -   07/20/18 0200 (!) 65/51 - - 90 22 100 % - -   07/20/18 0130 (!) 74/64 - - 89 24 96 % - -   07/20/18 0100 93/71 - - 86 21 100 % - -   07/20/18 0030 (!) 77/66 - - 92 25 99 % - -   07/20/18 0008 - - - - - 94 % - 19*   --   20*  22   MG   --    --   2.8*   --    PHOS   --    --   6.4*   --    BUN  95*   --   96*  99*   CREATININE  3.4*  3.1*  3.5*  3.7*   ALT  54*   --    --    --    AST  66*   --    --    --    BILITOT  0.7   --    --    --    ALKPHOS  65   --    --    --        Lab Results   Component Value Date    LABPROT 1.3 (H) 07/20/2018    LABPROT 1.3 07/20/2018    LABPROT 0.2 06/07/2018    LABPROT 0.2 06/07/2018       ASSESSMENT:  1. Acute kidney injury, recurrent, hemodynamically mediated in the setting of cardiorenal syndrome due to severe mitral valve regurgitation, and nonischemic cardiomyopathy, exacerbated by persistent atrial fibrillation. Started on dobutamine, Bumex, with subsequent clinical improvement since her arrival.  Azotemia is nonetheless progressing. 2. Chronic Kidney disease, stage IIIB. Baseline creatinine at 1.8-2.1 mg/dL, though she has had many values higher than this in the setting of cardiorenal syndrome. 3. Edema, multifactorial, presently due to nonischemic, no adenopathy, severe mitral valve regurgitation, advanced CKD with superimposed SHIELA  4. Anemia due to CKD, infirmity, phlebotomy associated with recent hospitalization. Iron studies 6/15/18 normal.  Last Aranesp injection was yesterday  5. 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 History of hypertension. Not currently an issue       Continue dobutamine  Continue Bumex  Continue Aranesp, next dose next Thursday  Follow laboratory studies: Repeat potassium this hours after last value  Avoid nephrotoxins  Follow labs, UO  Agree: Palliative medicine consultation    Discussed at length with her this morning regarding next interventions/prognosis (which is poor). She states again politely though emphatically that she does not wish ever to pursue dialysis of either modality, nor does she wish to be resuscitated, but rather \"made comfortable\" in the event of cardiopulmonary arrest or clinical worsening.     Discussed with  Awilda, as well as Dr. Bao Waldrop.       Electronically signed by Erica Mcginnis MD on 7/20/2018 at 10:13 AM

## 2018-07-20 NOTE — PROGRESS NOTES
Liaison Informational Visit Note      Referral received from [place], [place]           Call back number        Patient Name: Sam Rodríguez   :  1933  MRN:  [id number]    [facility] date:  2018    Admitted from: [facility] Admitting Physician:  Purnima De La Garza MD   PCP:  Purnima De La Garza MD      Primary Insurance: Payor: [address] [place] [address] /  /  /    Secondary Insurance:  unknown    Emergency Contact:   Contact 1: Name: Angelia Richter 1: Number:  or   Contact 1: Relationship: niece/POA  Contact/Relation: Contact 1: Name: Freida Yates / Contact 1: Relationship: niece/POA       Phone:  Contact 1: Number: [phone] or     Contact/Relation: Contact 2: Name: Lanie Gaucher / Contact 2: Relationship: niece   Phone:Contact 2: Number: [phone]    Advance Directive  Advance directives received No  Patient has completed an advance directive   Discussed with: Family member  DPOA-HC Name-Relation:Healthcare Agent's Name: Johnson Rogers Agent's Phone Number:  or [phone]      Terminal Diagnosis End stage CHF as confirmed by Dr. Nena Jorgensen Problem List:   Patient Active Problem List   Diagnosis Code    Dilated cardiomyopathy ([address] I42.0    Essential hypertension I10    Acute congestive heart failure (HCC) I50.9    Severe mitral regurgitation I34.0    Hypothyroidism E03.9    CKD (chronic kidney disease) stage 4, GFR 15-29 ml/min ([facility]) N18.4    Acute on chronic combined systolic and diastolic CHF (congestive heart failure) ([facility]) I50.43    PAF (paroxysmal atrial fibrillation) ([facility]) I48.0    Stage 4 chronic kidney disease ([address] N18.4    Chronic anemia D64.9    Pleural effusion, bilateral J90    Persistent atrial fibrillation ([address] I48.1    Atrial fibrillation with rapid ventricular response ([address] I48.91    Congestive heart failure due to valvular disease ([address] I50.9, I38    Cardiogenic shock ([address] R57.0    Sinus node held with roger De La Paz Dear and Abel Kathleen and patient  The hospice benefit and philosophy was explained to family. The following levels of care were discussed including, routine level of care at private home or facility, which hospice is not responsible for any room and board fees, and GIP level of care at the [facility] for short term symptom management. Once symptoms become managed, the patient would need to be moved to a lower level of care. Hospice House transition program also explained. Family informed that with the routine level of care,  the hospice team consists of the RN who visits 1-3 times a week, a  who visits within the first five days of the hospice election, the personal care team who visit 1-3 times a week, non-medical volunteers and Chaplains. Patient fell asleep during my meeting however both nieces present and received all information regarding hospice. Currently patient is on Levophed drip, Lidocaine drip and Dobutrex drip. Explained that all those would be stopped to transition to hospice care. Niorly would like to speak with other family members first before making final decision regarding hospice. Niorly state they would most likely do the Transition program at the [facility]. Family accepted a follow up visit tomorrow. CM/SW updated. Discharge Plan:  Discharge Disposition; undecided  Facility Name: undecided    hospitals plan:  1. Hospice is not managing any patient care at this time. 2.  Will follow up tomorrow with the family  3.   Any questions, please call [phone]    Electronically signed by Carrington Spencer RN on 7/20/2018 at 3:30 PM

## 2018-07-20 NOTE — CONSULTS
[address] [employer] Electrophysiology  Consultation Report  Patient: Lizzy Sepulveda  Medical Record Number: [id number]  Date of Service:  7/20/2018  Attending Electrophysiologist: Lissett Lynn MD, [facility]  Referring Physician: Dolly Vilchis MD and No ref. provider found and Dr. Lynne Sensor: wide complex tachycardia    HPI: 81 yo female who is well known to me with h/o persistent AF, LBBB, acute/chronic systolic HF, bradycardia, severe, MR, CKD 4, sinus node dysfunction, hypothyroidism who has had MULTIPLE admissions for CHF exacerbation. She underwent CRT-P implant last week with Dr. Cyndy Salmeron during her last hospitalization after a LENGTHY discussion in my office regarding the issues surrounding primary prevention ICD implantation which she refused and is DNR-CCA. She was discharged on 7/15/2018 and was doing \"therapy\" when she noted that she was increasingly SOB and weak and generally felt \"unwell. \"  911 was called and she was brought to ER and found to have a wide complex tachycardia. This was felt to be VT and thus was given IV amio and lidocaine in ER. Unfortunately no rhythm strips from ER are available other than the 12 lead ECG. She is currently on levophed and lidocaine. She was noted to be hypotensive, tachycardic with SHIELA, and her pro BNP up to 60K again. EP is now consulted for VT. She denies any syncope, but was \"weak. \"      Patient Active Problem List    Diagnosis Date Noted   [facility]) 07/20/2018    Ventricular tachycardia ([facility] [address] 07/19/2018    Tachy-teresa syndrome ([address]     Shock ([address] 07/03/2018    Acute kidney injury superimposed on chronic kidney disease ([address] 07/03/2018    UTI (urinary tract infection) 07/03/2018    Hyperkalemia 07/03/2018   [facility] acquired PNA 07/03/2018    Elevated troponin 07/03/2018    Constipation 07/03/2018    Cardiorenal syndrome     Left bundle branch block     Nonischemic July, 2018 and 12 July, 2018       Findings:   Pleural fluid is noted in the right lower lung, and is slightly more   prominent than on the most recent comparison study. Right   hemidiaphragm is relatively well-defined, and lateral pleural contours   are not abnormally prominent, indicating a small collection. Subtle   blunting of the lateral sulcus on the left is also noted. There is no   evidence of organized pneumonia.       Cardiovascular shadows are normal in appearance, with aortic intimal   calcification and a multipolar left-sided pacemaker. . There is no   evidence of cardiac enlargement or decompensation.       Skeletal structures show no evidence of acute pathology. Overlying EKG   leads and oxygen tubing are documented.           Impression   Probable small right-sided pleural effusion and barely perceptible   left effusion without evidence of florid CHF or organized pneumonia.      Telemetry: AF with RVR and intermittent V-pacing    EKG: wide complex rhythm rate 152, , QTc 569  - see scan in Cardiology    Last Echo: 7/3/2018  Unknown Provider Result 7/3/2018    Narrative     Transthoracic Echocardiography Report (TTE)     Demographics      Patient Name      BIBIANA        Gender              Female                     KEIRA CONSTANTINO      Medical Record    [id number]         Room Number         6422   Number      Account #         [de-identified]        Procedure Date      06/18/2018      Corporate ID                       Ordering Physician Monserrat Akhtar MD      Accession Number  [id number]        Referring Physician Asher Myers MD      Date of Birth     09/04/1933       Sonographer         Sun CHRISTIANSON      Age               84 year(s)       Interpreting        Keli Kaiser                                      Physician                                         Any Other     Procedure    Type of Study      TTE procedure:Echo Complete W/ Dop & Color Flow.     Procedure Date  Date: 06/18/2018 Start: 08:06 AM    Study Location: Echo Lab  Technical Quality: Adequate visualization    Indications:LV function. Patient Status: Routine    Height: 63 inches Weight: 187 pounds BSA: 1.88 m^2 BMI: 33.13 kg/m^2    Rhythm: Atrial fibrillation BP: 100/72 mmHg    Allergies    - No known allergies.     Findings      Left Ventricle   Concentric hypertrophy. Severe left ventricular systolic dysfunction.   Visually estimated LVEF is 15%. Severe global hypokinesis with regional   variation. Paradoxical septal bounce consistent with intraventricular   conduction delay.      Right Ventricle   Mildly dilated right ventricle with moderate RV dysfunction, based on free   wall and not TAPSE which is near normal.      Left Atrium   Severely dilated left atrium.      Right Atrium   Normal right atrium.      Mitral Valve   Mildly thickened and calcified mitral valve leaflets with apical   tethering. Severe mitral regurgitation with central regurgitant jet.      Tricuspid Valve   Mild tricuspid regurgitation.      Aortic Valve   Trileaflet aortic valve. Normal gradients. Mild aortic insufficiency.      Pulmonic Valve   Severe pulmonic insufficiency, central regurgitant jet.      Pericardial Effusion   A pericardial clear space is present suggesting a prominent pericardial   fat pad or hemodynamically insignificant pericardial effusion.      Aorta   Normal aortic root and ascending aorta.   Miscellaneous   Dilated IVC with no collapsibility. Estimated RAP is 8 mm Hg.      Conclusions      Summary   Trileaflet aortic valve. Normal gradients. Mild aortic insufficiency.   Severely dilated left atrium.   Concentric hypertrophy. Severe left ventricular systolic dysfunction.   Visually estimated LVEF is 15%. Severe global hypokinesis with regional   variation.  Paradoxical septal bounce consistent with intraventricular   conduction delay.   Normal aortic root and ascending aorta.   Mildly thickened and calcified mitral valve leaflets with apical   tethering. Severe mitral regurgitation with central regurgitant jet.   Severe pulmonic insufficiency, central regurgitant jet.   Normal right atrium.   Mildly dilated right ventricle with moderate RV dysfunction, based on free   wall and not TAPSE which is near normal.   Mild tricuspid regurgitation.   Dilated IVC with no collapsibility. Estimated RAP is 8 mm Hg.      Signature      ----------------------------------------------------------------   Electronically signed by Chas Kaiser(Interpreting   physician) on 07/03/2018 02:38 PM   ----------------------------------------------------------------     M-Mode/2D Measurements & Calculations      LV Diastolic    LV Systolic Dimension: 5.2   AV Cusp Separation: 1.8 cmLA   Dimension: 5.9  cm                           Dimension: 5.3 cmAO Root   cm              LV Volume Diastolic: 346. 6   Dimension: 3.5 cm   LV FS:11.9 %    ml   LV PW           LV Volume Systolic: 435.1 ml   Diastolic: 1.1  LV EDV/LV EDV Index: 171.6   cm              ml/91 ml/m^2LV ESV/LV ESV    RV Diastolic Dimension: 3.3   LV PW Systolic: Index: 896.7 UG/55WM/ m^2    cm   1.4 cm          EF Calculated: 23.1 %   Septum          LV Mass Index: 162 l/min*m^2 LA/Aorta: 9.66   Diastolic: 1.3  LV Length: 7 cm              Ascending Aorta: 2.8 cm   cm                                           LA volume/Index: 178.8 ml                   LVOT: 1.9 cm                 /95.09ml/m^2   LV Mass: 305.45                              RA Area: 23.4 cm^2   g                                                IVC Expiration: 1.8 cm     Doppler Measurements & Calculations      MV Peak E-Wave:    AV Peak Velocity: 1.78  LVOT Peak Velocity: 0.94 m/s   1.41 m/s           m/s                     LVOT Mean Velocity: 0.5 m/s                      AV Peak Gradient: 12.6  LVOT Peak Gradient: 3.5   MV Peak Gradient:  mmHg                    mmHgLVOT Mean Gradient: 1.3   7.9 mmHg           AV Mean Velocity: 0.99  mmHg   MV Mean Gradient:  m/s   5.4 mmHg           AV Mean Gradient: 4.9   MV Mean Velocity:  mmHg   1.13 m/s           AV VTI: 24.2 cm         TR Velocity:2.81 m/s                      AV Area                 TR Gradient:31.47 mmHg   MV Area            (Continuity):1.66 cm^2  PV Peak Velocity: 1.24 m/s   (continuity): 2    AV Deceleration Time:   PV Peak Gradient: 6.16 mmHg   cm^2               1220.9 msec             PV Mean Velocity: 0.83 m/s                      LVOT VTI: 14.2 cm       PV Mean Gradient: 3.2 mmHg      MR Velocity: 4.03                          CA ED Velocity: 1.59 m/s   m/s   MR VTI: 126.7 cm     [email or web address] [id number]%3d         Specimen Collected: 06/18/18 08:06        Last Cath: 5/19/2014  Cath Lab Report [id number] 4/14/2014  1:54 PM Luis Felipe Holguin MD   Signed by Luis Felipe Holguin MD on 05/19/14 at [address] [place] [address]                                [id number]      PROCEDURE DATE:         INDICATION:  Elevated CPK/MB fraction following a cholecystectomy consistent   with non-STEMI.      PROCEDURE:  (1) Left heart catheterization.  (2) Left ventriculography.  (3)   Coronary angiography.  (4) Selective right common femoral arteriogram.      PROCEDURAL METHOD:  The patient was brought into the cardiac cath lab.  The   procedure was explained and the usual oral and written consents were   obtained.  The right and left groin were prepped and draped in the usual   sterile fashion.  The skin over the right femoral artery was anesthetized   with 1% Xylocaine.  A 5-Bolivian femoral sheath was introduced via modified   Seldinger technique without any difficulties.   A 5-Bolivian JL #4, 5-Bolivian JR   #4, and 5-Bolivian pigtail catheter were all used to complete the   catheterization.  Over the guidewire, exchange technique was used.  The   patient tolerated the procedure well.  There were

## 2018-07-20 NOTE — CARE COORDINATION
7/20/2018 Social work  Transition of care/discharge planning  Sw was notified by [employer] Nurse that patient and family will discuss options and meet with [facility] tomorrow. Sw will follow and assist prn.   Electronically signed by GERALD Rodriguez on 7/20/2018 at 3:21 PM

## 2018-07-20 NOTE — PROGRESS NOTES
Occupational Therapy    OT consult received to eval/treat and chart review complete. Patient and family considering hospice services. Await further decisions for POC prior to therapy evaluation. OT to re-attempt at a later time if still needed. Thank you.      Balbir Bowie, OTR/L  [id number]

## 2018-07-20 NOTE — H&P
[facility]  Internal Medicine Residency Program  History and Physical  MICU    Patient:  Matt Cook 80 y.o. female MRN: [id number]     Date of Service: 7/20/2018    Hospital Day: 2      Chief complaint: Fatigue and SOB  History of Present Illness   The patient is a 80 y.o. female with PMH of CKD stage 4, persistent Afib,severe Mitral insufficency,HFrEF(23.1% ,echo 7/3/2018) s/p biventricular pacemaker (7/12/2018),non ischemic cardiomyopathy, chronicLBBB,HTN,hypothyroidism, who presented to ED from nursing home with c/o Sob and feeling fatigue. Accoring to patient she felt fatigue this morning during her physical therapy and not able to continue her therapy. She also reports of associated SOB, back pain and nausea. She denies chestpain, palpitation,sweating,fever, cough. She recently under went bi -ventricularpacemaker placement 1 week back for     On arrival to ED, patient was found to be in V-tach with rate of 152 and low BP, Pro-BNP 64,579 (baseline around 30,000). Patient was started on Lidocaine, Amiodarone and Levophed . She also receive calcium gluconate , sodium bicarb,insulin and kayexalate for hyperkalemia and transfer to MICU for futher management and monitoring. Past Medical History:      Diagnosis Date    A-fib [facility])     Chronic systolic congestive heart failure ([address] 1/24/2017    CKD (chronic kidney disease) stage 4, GFR 15-29 ml/min ([facility])     Hypertension     Hypothyroidism     Mitral valve insufficiency        Past Surgical History:        Procedure Laterality Date    CARDIAC CATHETERIZATION  1997    CATARACT REMOVAL WITH IMPLANT Bilateral     CHOLECYSTECTOMY, LAPAROSCOPIC  4/11/14    TONSILLECTOMY         Medications Prior to Admission:    Prior to Admission medications    Medication Sig Start Date End Date Taking?  Authorizing Provider   bisacodyl (DULCOLAX) 10 MG suppository Place 10 mg rectally daily as needed for Constipation   Yes Historical Provider, MD Sodium Phosphates (FLEET) 7-19 GM/118ML Place 1 enema rectally daily as needed   Yes Historical Provider, MD   mineral oil-hydrophilic petrolatum (HYDROPHOR) ointment Apply topically as needed for Dry Skin Apply topically as needed. Yes Historical Provider, MD   diphenhydrAMINE (BENADRYL) 25 MG tablet Take 1 tablet by mouth every 6 hours as needed for Itching 7/15/18 8/14/18 Yes Artis Cline MD   mineral oil-hydrophilic petrolatum (HYDROPHOR) ointment Apply topically as needed.  7/15/18  Yes Artis Cline MD   bumetanide (BUMEX) 2 MG tablet Take 1 tablet by mouth 2 times daily 7/15/18  Yes Artis Cline MD   darbepoetin seth-polysorbate (ARANESP) 60 MCG/0.3ML SOSY injection Inject 0.3 mLs into the skin once a week 7/15/18  Yes Artis Cline MD   polyethylene glycol [facility]) packet Take 17 g by mouth daily 7/16/18 8/15/18 Yes Artis Cline MD   magnesium hydroxide (MILK OF MAGNESIA) 400 MG/5ML suspension Take 30 mLs by mouth daily as needed for Constipation   Yes Historical Provider, MD   acetaminophen (TYLENOL) 325 MG tablet Take 650 mg by mouth every 4 hours as needed for Pain or Fever (temp > 100.4)    Yes Historical Provider, MD   melatonin (RA MELATONIN) 3 MG TABS tablet Take 1 tablet by mouth nightly as needed (insomnia) 6/27/18  Yes Tyesha Schooling, APRN - CNP   potassium chloride (KLOR-CON M) 20 MEQ extended release tablet Take 1 tablet by mouth 2 times daily 6/20/18  Yes Artis Cline MD   Cholecalciferol (VITAMIN D3) 58417 units CAPS Take 1 capsule by mouth every 7 days On Mondays   Yes Historical Provider, MD   apixaban (ELIQUIS) 2.5 MG TABS tablet Take 1 tablet by mouth 2 times daily 5/11/18  Yes Say Santiago DO   docusate sodium (COLACE) 100 MG capsule Take 100 mg by mouth 2 times daily as needed for Constipation    Yes Historical Provider, MD   Multiple Vitamins-Minerals (ICAPS AREDS 2) CAPS Take 1 capsule by mouth 2 times daily 05/23   Yes Historical Provider, MD   levothyroxine exacerbated by decompensated heart failure  -[facility] in 2016 - normal coronaries  -She is on Lidocaine gtt   -Monitor electrolytes and keep Mag above 2 and K above 4   - will try to introduce GDMT when she is more stable and tolerates medications   -Cardiology consulted. 3.Decompensated HFrEF(23.1%) s/p CRT-P  - NYHA III, Stage C  -CXR as above and she has clinical findings of hypervolemia   -Hold diuresis in settings of low blood pressure. May consider dialysis   - Pro BNP is above baseline   - Monitor input and output   - She is not on hydralazine/nitrate/BB/ ACEI/ ARBSI due to tachy- teresa and hypotension issues in the past.     4.Severe mitral regurgitation:   She was evaluated for Mitral Clip and she was not qualified because of her commodities      5. Lactic acidosis:   -Likely 2/2 to hypoperfusion in settings of heart failure   -Will keep trending and will continue IVF     6. Hyperkalemia  -K:6.9  -received insulin,calcium gluconate,kayalete,sodium bicarb  -monitor potassium    7. SHIELA on CKD stage 4, probable cardiorenal syndrome:   -S.cr:3.4, (baseline1. 7)BUN 95  -patient no on dialysis(refuse Dialysis)  -hold Bumex with her hypotension . 8. Anemia of chronic kidney disease:   -Hemoglobin is around her baseline   -Monitor CBC daily and transfuse if less than 8     9. Persistent AF  Will hold   On Eliquis     6. HX hypothyroidism  - on synthroid  Last TSH  3.440 recnetly on May   Follow up TSH level     DVT prophylaxis/ GI prophylaxis: she is on Eliquis 2.5 bid   Disposition: MICU    Code status:   DNR-CCA      Daphne Ramirez MD, PGY-1   Internal medicine resident    Discussed with Dr. Mary Krishna    Attending physician: Dr. Gilda Wong    I personally saw, examined and provided care for the patient. Radiographs, labs and medication list were reviewed by me independently. I spoke with bedside nursing, therapists and consultants.  Critical care services and times documented are independent of procedures and

## 2018-07-20 NOTE — CARE COORDINATION
7/20/2018 social work transition of care/discharge planning  Sw followed up with [facility] for pt bed status. Will await return call. Michelle will follow and assist prn.   Electronically signed by GERALD Meléndez on 7/20/2018 at 9:23 AM

## 2018-07-21 NOTE — PROGRESS NOTES
Subjective:  59-year-old woman seen on ICU earlier this morning  Data reviewed in detail  Patient has had multiple recent hospitalizations related to CHF A. Fib acute kidney injury  Patient with dilated cardiomyopathy and  Severe functional regurgitation of the mitral valve  Complicated by persistent atrial fibr and chronic kidney disease  Admitted with tachyarrhythmias atrial versus ventricular  Currently on IV lidocaine/dobutamine/norepinephrine  In lactic acidosis due to hypoperfusion secondary to cardiorenal syndrome    Objective:    BP (!) 90/59   Pulse 85   Temp 97.8 °F (36.6 °C) (Temporal)   Resp 17   Ht 5' 10\" (1.778 m)   Wt 185 lb 3.2 oz (84 kg)   SpO2 100%   BMI 26.57 kg/m²   Looks pale  Minimal dyspnea  Alert oriented  Oral mucosa moist  Neck no stiffness  Heart:  Irregular and somewhat rapid  Systolic murmur unchanged  Lungs:  Diminished breath sounds  Abd: bowel sounds present, nontender, nondistended, no masses  Extrem:  No clubbing, cyanosis, and edema is rather extensive but much improved today  Data reviewed in detail    Assessment:  Tachyarrhythmia atrial versus ventricular  Cardiorenal syndrome with hypoperfusion resulting in lactic acidosis  Dilated cardiomyopathy  Severe MR  Persistent A.  Fib  Chronic kidney disease  comorbidities as listed below  Patient Active Problem List   Diagnosis    Dilated cardiomyopathy ([address]    Essential hypertension    Acute congestive heart failure (HCC)    Severe mitral regurgitation    Hypothyroidism    CKD (chronic kidney disease) stage 4, GFR 15-29 ml/min (HCC)    Acute on chronic combined systolic and diastolic CHF (congestive heart failure) (HCC)    PAF (paroxysmal atrial fibrillation) (HCC)    Stage 4 chronic kidney disease (HCC)    Chronic anemia    Pleural effusion, bilateral    Persistent atrial fibrillation (HCC)    Atrial fibrillation with rapid ventricular response (HCC)    Congestive heart failure due to valvular disease ([address]   

## 2018-07-21 NOTE — PROGRESS NOTES
Readings from Last 3 Encounters:   07/20/18 185 lb 3.2 oz (84 kg)   07/15/18 175 lb 4.8 oz (79.5 kg)   07/03/18 181 lb (82.1 kg)       Constitutional:  in no acute distress  Oral: mucus membranes moist  Neck: Soft supple, trachea midline. Sitting up in bed. No JVD. Cardiovascular: S1, S2 irregularly irregular rhythm, no murmur,or rub  Respiratory: CTA bilaterally, decreased air entry bilaterally at the bases -- no crackles or wheeze  Gastrointestinal:  Soft, nontender, nondistended, NABS  Ext:edema 1 + Stilley  Skin: dry, no rash  Psych: awake, alert, interactive, appropriate affect and mood  Neuro: Moves all 4 extremities. Cranial nerves II through XII grossly intact. DATA:    Recent Labs      07/19/18   1830  07/20/18   0340  07/21/18   0615   WBC  4.5  7.4  5.4   HGB  8.1*  8.6*  7.2*   HCT  26.5*  27.3*  23.2*   MCV  99.6  97.8  97.1   PLT  211  244  150     Recent Labs      07/19/18   1830   07/20/18   0340   07/20/18   1145  07/20/18   1745  07/21/18   0044  07/21/18   0615   NA  135   --   132   < >  135  140  135   --    K  6.9*   --   6.6*   < >  5.4*  4.8  4.1  3.9   CL  89*   --   89*   < >  90*  92*  91*   --    CO2  19*   --   20*   < >  27  29  31*   --    MG   --    --   2.8*   --    --    --    --   2.2   PHOS   --    --   6.4*   --    --    --    --   5.0*   BUN  95*   --   96*   < >  97*  95*  93*   --    CREATININE  3.4*   < >  3.5*   < >  3.6*  3.7*  3.5*   --    ALT  54*   --    --    --    --    --    --    --    AST  66*   --    --    --    --    --    --    --    BILITOT  0.7   --    --    --    --    --    --    --    ALKPHOS  65   --    --    --    --    --    --    --     < > = values in this interval not displayed. Lab Results   Component Value Date    LABPROT 1.3 (H) 07/20/2018    LABPROT 1.3 07/20/2018    LABPROT 0.2 06/07/2018    LABPROT 0.2 06/07/2018       ASSESSMENT:  1.  Acute kidney injury, recurrent, hemodynamically mediated in the setting of cardiorenal syndrome due to severe mitral valve regurgitation, and nonischemic cardiomyopathy, exacerbated by persistent atrial fibrillation. Started on dobutamine, Bumex, with subsequent clinical improvement since her arrival.  Azotemia is nonetheless progressing. 2. Chronic Kidney disease, stage IIIB. Baseline creatinine at 1.8-2.1 mg/dL, though she has had many values higher than this in the setting of cardiorenal syndrome. 3. Edema, multifactorial, presently due to nonischemic, no adenopathy, severe mitral valve regurgitation, advanced CKD with superimposed SHIELA  4. Anemia due to CKD, infirmity, phlebotomy associated with recent hospitalization. Iron studies 6/15/18 normal.  Last Aranesp injection was yesterday  5. 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 History of hypertension. Not currently an issue       Continue dobutamine  Continue Bumex  Continue Aranesp, next dose next Thursday  Follow laboratory studies: Repeat potassium this hours after last value  Avoid nephrotoxins  Follow labs, UO  Agree: Palliative medicine consultation    Discussed at length with her this morning regarding next interventions/prognosis (which is poor). She states again politely though emphatically that she does not wish ever to pursue dialysis of either modality, nor does she wish to be resuscitated, but rather \"made comfortable\" in the event of cardiopulmonary arrest or clinical worsening. Pt chose to go to hospice for comfort care .          Electronically signed by Sanders Gosselin, MD on 7/21/2018 at 12:43 PM

## 2018-07-21 NOTE — PROGRESS NOTES
[address] [facility]  Department of Internal Medicine   Internal Medicine Residency   MICU Progress Note    Patient:  Viki Castellano 80 y.o. female  MRN: [id number]     Date of Service: 7/21/2018    Allergy: Heparin and Vancomycin    Subjective     Patient seen and examined at bedside. Denies any problems except for itching around her body. States that she does not have any pain but understands that she is going to be going onto Hospice. She has discussed it with her family. ROS: Denies Fever/chills/CP/SOB/N/V/D/C/Dysuria/Blood in stool or urine. Confirms pruritis over entire body which she has had ever since she had her AICD placed  Objective     VS: BP 88/63   Pulse 103   Temp 97.8 °F (36.6 °C) (Temporal)   Resp 26   Ht 5' 10\" (1.778 m)   Wt 185 lb 3.2 oz (84 kg)   SpO2 100%   BMI 26.57 kg/m²           I & O - 24hr:   Intake/Output Summary (Last 24 hours) at 07/21/18 1320  Last data filed at 07/21/18 1300   Gross per 24 hour   Intake             2022 ml   Output              846 ml   Net             1176 ml       Physical Exam:  · General Appearance: alert, appears stated age, cooperative and no distress  · Neck: no adenopathy, no carotid bruit, no JVD, supple, symmetrical, trachea midline and thyroid not enlarged, symmetric, no tenderness/mass/nodules  · Lung: clear to auscultation bilaterally and rhonchi bilaterally decreased air movement in BL lower lungs, no distress  · Heart: irregularly irregular rhythm, S1, S2 normal, no click and no rub  · Abdomen: soft, non-tender; bowel sounds normal; no masses,  no organomegaly  · Extremities:  edema 1+ pitting BL to knees and 2+ at sacral BL  and no ulcers, gangrene or trophic changes  · Musculoskeletal: No joint swelling, no muscle tenderness. ROM normal in all joints of extremities.    · Neurologic: Mental status: Alert, oriented, thought content appropriate    Lines     site day    Art line   None    TLC None Removed 7/21/18   PICC None

## 2018-07-21 NOTE — PROGRESS NOTES
Clearance: 13 mL/min (A) (based on SCr of 3.7 mg/dL (H)). - baseline Cr ~2.4-2.8     5. Severe MR  - see above from [facility]  - pt NOT a surgical candidate  - Pt reports she is not interested in Adriana-Clip anymore    6. Permanent AF  - on [address]    7. Goals/Values  - on Hospice now  - NO overt EP needs, signing off  - please call if any questions    Thank you for allowing me to participate in your patient's care. Please call me if there are any questions.       Missy Santos MD, [facility]  Cardiac Electrophysiology  [facility]) Physicians  [facility] [place]: [place] [facility]  10:54 AM  7/21/2018

## 2018-07-22 NOTE — PROGRESS NOTES
Advanced Heart Failure and Pulmonary Hypertension  Inpatient Progress Note          CC/ID: end stage CM, ADHF, cardiogenic shock, VT/AF, CRT-P, severe secondary MR        24-hour events/subjective:  -no overnight events  -hospice today  -social visit with family  -some med adjustments made in prep of discharge    Physical Exam:    /63   Pulse 112   Temp 97.4 °F (36.3 °C) (Temporal)   Resp 16   Ht 5' 10\" (1.778 m)   Wt 183 lb (83 kg)   SpO2 98%   BMI 26.26 kg/m²   Wt Readings from Last 3 Encounters:   07/22/18 183 lb (83 kg)   07/15/18 175 lb 4.8 oz (79.5 kg)   07/03/18 181 lb (82.1 kg)     Appearance: Awake, alert, no acute respiratory distress  Skin: Intact, no rash  Head: Normocephalic, atraumatic  Eyes: EOMI, no conjunctival erythema, anicteric sclerae  ENMT: No pharyngeal erythema, MMM, no rhinorrhea  Neck: Soft, supple, JVD with a JVP 12  Lungs: Clear to auscultation bilaterally.  III/VI holosystolic apical murmur  Abdomen: Soft, nontender, +bowel sounds  Extremities: Moves all extremities x 4, edema  Neurologic: No focal motor deficits apparent, normal mood and affect  Peripheral Pulses: Intact posterior tibial pulses bilaterally            Laboratory Tests:    Lab Results   Component Value Date    CREATININE 3.5 (H) 07/21/2018    BUN 93 (H) 07/21/2018     07/21/2018    K 3.9 07/21/2018    CL 91 (L) 07/21/2018    CO2 31 (H) 07/21/2018     Lab Results   Component Value Date    MG 2.2 07/21/2018     Lab Results   Component Value Date    WBC 5.4 07/21/2018    HGB 7.2 (L) 07/21/2018    HCT 23.2 (L) 07/21/2018    MCV 97.1 07/21/2018     07/21/2018     Lab Results   Component Value Date    CKTOTAL 41 07/03/2018    CKMB 2.4 06/07/2018    TROPONINI 0.21 (H) 07/20/2018     Lab Results   Component Value Date    INR 1.5 07/12/2018    INR 1.7 06/07/2018    INR 1.9 05/29/2018    PROTIME 16.8 (H) 07/12/2018    PROTIME 18.8 (H) 06/07/2018    PROTIME 20.6 (H) 05/29/2018     Lab Results Component Value Date    ALT 54 (H) 07/19/2018    AST 66 (H) 07/19/2018    ALKPHOS 65 07/19/2018    BILITOT 0.7 07/19/2018     Lab Results   Component Value Date    TSH 8.820 (H) 07/20/2018     Lab Results   Component Value Date    LABA1C SEE NOTE (AA) 05/21/2018     No results found for: EAG  Lab Results   Component Value Date    CHOL 138 04/05/2016     Lab Results   Component Value Date    TRIG 56 04/05/2016     Lab Results   Component Value Date    HDL 53 04/05/2016     Lab Results   Component Value Date    LDLCALC 74 04/05/2016     No results found for: LABVLDL, VLDL  Lab Results   Component Value Date    CHOLHDLRATIO 2.6 04/05/2016             Diagnostics:   reviewed      Current Medications:  Current Facility-Administered Medications   Medication Dose Route Frequency Provider Last Rate Last Dose    morphine (PF) injection 2 mg  2 mg Intravenous Q2H PRN Arturo Weiss MD        diphenhydrAMINE (BENADRYL) injection 25 mg  25 mg Intravenous Q6H PRN Arturo Weiss MD        diphenhydrAMINE (BENADRYL) tablet 25 mg  25 mg Oral Q6H PRN Sherri Mora Koppula   25 mg at 07/21/18 2155    acetaminophen (TYLENOL) tablet 650 mg  650 mg Oral Q4H PRN Giselle Hogan MD   650 mg at 07/22/18 0115    glucose (GLUTOSE) 40 % oral gel 15 g  15 g Oral PRN Giselle Hogan MD        bumetanide (BUMEX) injection 0.5 mg  0.5 mg Intravenous Daily Chastity Monge MD   0.5 mg at 07/20/18 2029             Assessment/Recommendations:     1. Acute on chronic heart failure with reduced LVEF:  -NYHA FC 3b  2. NICM:  -ACC/AHA stage D  -LVEDD 81 mm  -severe secondary MR   -April 2017 - normal RV function  --> PRISCILLA reviewed. -[facility] in 2016 - normal coronaries  -suboptimal GDMT - tachy teresa, off BB, and CKD so has been off of ACE I/ARB/ARNI  -notably during last hospitalization (recently) when she was optimized, she had controlled rates, no teresa, no tachy at that point, see # 7 below  3. VT versus AF  4.  Severe functional MR  5. Acute on chronic renal insufficiency:  6. CRT-P in place  7. Hypochromic anemia:  8. Atrial fibrillation  -may be affecting frewuency of CRT P       I know patient and her family Jimena Hernandez and Gus Bass and met Dequan Lai once (they are all her DPOAs, all nieces and nephews). This is now her third hospitalization in the past couple months for end stage nonischemic cardiomyopathy with severe functional mitral regurgitation, and > tenth admission within the past year (if not more). We have exhausted all treatment modalities at this point, or feel collectively that even with a low risk procedure (for e.g. AVN ablation) that it would not palliate her for very long given such severe LV dilatation and recurrent ADHF.      Hospice being set up currently. Stop alll gtts but dobutamine. Dobutamine to stop on discharge to [facility]. D/C [facility].        I spent > 35 minutes time discussing all the above with patient and her family. Thank you for allowing us to participate in the care of this patient. We will follow as medical course develops. Do not hesitate to contact us with further questions. Sandy Frank M.D.   Advanced Heart Failure and Pulmonary Hypertension  Mobile [phone]

## 2018-07-22 NOTE — CONSULTS
Advanced Heart Failure and Pulmonary Hypertension Consult Note      Reason for Consultation: acutely decompensated chronic heart failure        Referring Physician:  Primary Cardiologist:         History of Present Illness:   Stephanie Cook is a pleasant 80year old with nonischemic cardiomyopathy, severe functional mitral regurgitation, LBBB, chronic kidney disease, who we first met during hospitalization this past month. She had a prolonged medical course at [facility] and then was transferred here. We aggressively diuresed her with improvement of SHIELA to baseline creatinine in the 1-2 range. EP evaluated her for implantation of CRT-P versus CRT-D (she felt she would opt for CRT-P) however we delayed implant in order for her to be evaluated first for MitraClip as we felt based on a component of degenerative disease that she may qualify and did not want eventual Mitral Clip with transeptal puncture to interfere with pacer leads. She was transferred to [facility] in [place] for evaluation by their MitraClip team. There right heart catheterization revealed euvolemia, and decent cardiac indices. They continued the current med regimen. She underwent PRISCILLA and evaluation by the multi disciplinary MitraClip team. Decision was made to eventually proceed as they felt she would be a good candidate, however first opted for CRT to augment LVEF and optimize her as much as possible prior to the MitraClip versus deferment completely of the MitraCLip. She was transferred to local SNF. Readmitted a week later, with decompensated heart failure. Diuresed, underwent implantation of CRT-P, discharged back to [facility]. Then returned last night with AF versus VT. Past medical, surgical, family and social histories have been reviewed. Any changes in the past medical history, social history or family history have been made and are reflected in the electronic medical record.          Patient Active Problem suspension Take 30 mLs by mouth daily as needed for Constipation      acetaminophen (TYLENOL) 325 MG tablet Take 650 mg by mouth every 4 hours as needed for Pain or Fever (temp > 100.4)       melatonin (RA MELATONIN) 3 MG TABS tablet Take 1 tablet by mouth nightly as needed (insomnia) 30 tablet 3    potassium chloride (KLOR-CON M) 20 MEQ extended release tablet Take 1 tablet by mouth 2 times daily 60 tablet 3    Cholecalciferol (VITAMIN D3) 05664 units CAPS Take 1 capsule by mouth every 7 days On Mondays      apixaban (ELIQUIS) 2.5 MG TABS tablet Take 1 tablet by mouth 2 times daily 60 tablet 0    docusate sodium (COLACE) 100 MG capsule Take 100 mg by mouth 2 times daily as needed for Constipation       Multiple Vitamins-Minerals (ICAPS AREDS 2) CAPS Take 1 capsule by mouth 2 times daily 05/23      levothyroxine (SYNTHROID) 125 MCG tablet Take 1 tablet by mouth Daily 90 tablet 3         Review of Systems:   Cardiac: As per HPI  General: No fever, chills, rigors  Pulmonary: As per HPI  HEENT: No visual disturbances, difficult swallowing  GI: No nausea, vomiting, abdominal pain  : No dysuria or hematuria  Endocrine: No thyroid disease or diabetes  Musculoskeletal: PZA x 4, no focal motor deficits  Skin: Intact, no rashes  Neuro/Psych: No headache or seizures          Weights: Wt Readings from Last 3 Encounters:   07/22/18 183 lb (83 kg)   07/15/18 175 lb 4.8 oz (79.5 kg)   07/03/18 181 lb (82.1 kg)     Physical Examination:     /63   Pulse 112   Temp 97.4 °F (36.3 °C) (Temporal)   Resp 16   Ht 5' 10\" (1.778 m)   Wt 183 lb (83 kg)   SpO2 98%   BMI 26.26 kg/m²     CONSTITUTIONAL: Alert and oriented times 3, no acute distress and cooperative to examination with proper mood and affect. SKIN: Skin color, texture, turgor normal. No rashes or lesions. LYMPH: no cervical nodes, no inguinal nodes  HEENT: Head is normocephalic, atraumatic. EOMI, PERRLA.   NECK: JVD  CHEST/LUNGS:  Diminished, bilateral Hemodynamics:  Hemodynamics:  Hemodynamics: Condition 2     Pressures    Condition: CONDITION 1     RA  (a/v/m): ** / 6 / 5 mmHg    RV  (s/d/ed): 49 / 4 / 6 mmHg    PA  (s/d/m): 57 / 22 / 35 mmHg     PWedge (a/v/m): ** / 22 / 20 mmHg    AO  (s/d/m): 123 / 75 / 90 mm hg    Oxygen Values:    HR 59 BPM    PA O2 Saturation 51 %    PA Hemoglobin 11.3 g/dl    SA O2 Saturation 98 %    SA Hemoglobin 11.3 g/dl      Flows and Resistance    Condition: CONDITION 1     Rand Stroke Volume 61.5 mls    Rand Cardiac Output 3.63 l/min    Rand Cardiac Output Index 1.84 l/m/m2    Thermal Stroke Volume 61.4 mls    Thermal Cardiac Output 3.62 l/min    Thermal Cardiac Index 1.84 l/m m2     HRU    PVR Index 652 HRU    Estimated O2 262.01     O2 Difference SA-SV 72.23 cc/min    Pulmonary Flow 3.63 l/min    Pulmonary Flow Index 1.84 l/min    Systemic Vascular Resistance 1875 HRU    Systemic Flow Index 1.84 l/min    Systemic Flow 3.63 l/min    Impression:     RA - normal   PA pressure - moderately elevated   PCWP - elevated (taken at end expiration)   Cardiac output and cardiac index - low   PVR - elevated   SVR - elevated        7/3/2018 ECG: atrial fibrillation, LBBB  ms        Assessment/Recommendations:    1. Acute on chronic heart failure with reduced LVEF:  -NYHA FC 3b  2. NICM:  -ACC/AHA stage D  -LVEDD 81 mm  -severe secondary MR   -April 2017 - normal RV function  --> PRISCILLA reviewed. -[facility] in 2016 - normal coronaries  -suboptimal GDMT - tachy teresa, off BB, and CKD so has been off of ACE I/ARB/ARNI  -notably during last hospitalization (recently) when she was optimized, she had controlled rates, no teresa, no tachy at that point, see # 7 below  3. VT versus AF  4. Severe functional MR  5. Acute on chronic renal insufficiency:  6. CRT-P in place  7. Hypochromic anemia:  8. Atrial fibrillation  -may be affecting frewuency of CRT P      Awaiting device interrogation. Spoke with EP. Possible AF (as opposed to VT).  Will determine based on interrogation. I know patient and her family Augusto Shaikh and Willy Day and met Nik Chairez once (they are all her DPOAs, all nieces and nephews). This is now her third hospitalization in the past couple months for end stage nonischemic cardiomyopathy with severe functional mitral regurgitation, and > tenth admission within the past year (if not more). We have exhausted all treatment modalities at this point, or feel collectively that even with a low risk procedure (for e.g. AVN ablation) that it would not palliate her for very long given such severe LV dilatation and recurrent ADHF.       Discussed hospice care with Dr. Kenna Henriquez via phone call. DPOAs are ok with this, but will arrive next hour to have discussion with her to confirm her desires. I offered palliative inotropes but the problem with this is the fact (I don't think) any SNFs would accept her, and hospice will not accept with chronic inotropes, and honestly do not think it will help for long at all. Augusto Shaikh understood this but knows it it a potential option if Ms. Cortez is resistant to withdrawing all       Charlene Lundberg M.D.   Advanced Heart Failure and Pulmonary Hypertension  [facility] [address] [place] [address]

## 2018-07-22 NOTE — PROGRESS NOTES
nurse, to leave carson and hep lock in and to send copy of AVS with patient to the [facility].

## 2018-07-22 NOTE — PROGRESS NOTES
Subjective:  59-year-old woman seen on 5 w ext earlier this morning  Data reviewed in detail  Patient has had multiple recent hospitalizations related to CHF A. Fib acute kidney injury  Patient with dilated cardiomyopathy and  Severe functional regurgitation of the mitral valve  Complicated by persistent atrial fibr and chronic kidney disease  Admitted with tachyarrhythmias atrial versus ventricular   cardiorenal syndrome    Objective:    /63   Pulse 112   Temp 97.4 °F (36.3 °C) (Temporal)   Resp 16   Ht 5' 10\" (1.778 m)   Wt 183 lb (83 kg)   SpO2 98%   BMI 26.26 kg/m²   Looks pale  Minimal dyspnea  Alert oriented  Oral mucosa moist  Neck no stiffness  Heart:  Irregular and somewhat rapid  Systolic murmur unchanged  Lungs:  Diminished breath sounds  Abd: bowel sounds present, nontender, nondistended, no masses  Extrem:  No clubbing, cyanosis, and edema is rather extensive but much improved today  Data reviewed in detail    Assessment:  Tachyarrhythmia atrial versus ventricular  Cardiorenal syndrome with hypoperfusion resulting in lactic acidosis  Dilated cardiomyopathy  Severe MR  Persistent A.  Fib  Chronic kidney disease  comorbidities as listed below  Patient Active Problem List   Diagnosis    Dilated cardiomyopathy ([address]    Essential hypertension    Acute congestive heart failure (HCC)    Severe mitral regurgitation    Hypothyroidism    CKD (chronic kidney disease) stage 4, GFR 15-29 ml/min (HCC)    Acute on chronic combined systolic and diastolic CHF (congestive heart failure) (HCC)    PAF (paroxysmal atrial fibrillation) ([facility])    Stage 4 chronic kidney disease (HCC)    Chronic anemia    Pleural effusion, bilateral    Persistent atrial fibrillation (HCC)    Atrial fibrillation with rapid ventricular response (HCC)    Congestive heart failure due to valvular disease ([address]    Cardiogenic shock (HCC)    Sinus node dysfunction (HCC)    LBBB (left bundle branch block)    Cardiorenal

## 2018-07-26 NOTE — DISCHARGE SUMMARY
Physician Discharge Summary     Patient ID:  Annette Garcia  [id number]  80 y.o.  9/4/1933    Admit date: 7/19/2018    Discharge date and time: 7/22/2018  1:34 PM     Admission Diagnoses: Ventricular tachycardia ([address] [I47.2]  Ventricular tachycardia ([address] [I47.2]  V tach ([address] [I47.2]    Discharge Diagnoses:   Patient Active Problem List   Diagnosis    Dilated cardiomyopathy ([address]    Essential hypertension    Acute congestive heart failure ([address]    Severe mitral regurgitation    Hypothyroidism    CKD (chronic kidney disease) stage 4, GFR 15-29 ml/min ([facility])    Acute on chronic combined systolic and diastolic CHF (congestive heart failure) ([facility])    PAF (paroxysmal atrial fibrillation) ([facility])    Stage 4 chronic kidney disease ([facility])    Chronic anemia    Pleural effusion, bilateral    Persistent atrial fibrillation (HCC)    Atrial fibrillation with rapid ventricular response ([facility])    Congestive heart failure due to valvular disease ([address]    Cardiogenic shock ([facility])    Sinus node dysfunction ([facility])    LBBB (left bundle branch block)    Cardiorenal syndrome    Left bundle branch block    Nonischemic cardiomyopathy ([address]    Shock ([address]    Acute kidney injury superimposed on chronic kidney disease ([address]    UTI (urinary tract infection)    Hyperkalemia    Hospital acquired PNA    Elevated troponin    Constipation    Tachy-teresa syndrome (HCC)    Ventricular tachycardia (HCC)    V tach (HCC)    Wide-complex tachycardia (HCC)    Biventricular cardiac pacemaker in situ       Consults: cardiology nephrology critical care    Procedures:    Hospital Course:   80-year-old  woman with end-stage heart disease complicated by renal failure  Readmitted from nursing home due to ventricular tachycardia  After multiple discussions patient and family decided to approach hospice services for comfort care  Patient was discharged to hospice house    Discharge Exam:  See progress note from today    Disposition: hospice house    Patient Instructions:   Discharge Medication List as of 7/22/2018 11:58 AM      CONTINUE these medications which have NOT CHANGED    Details   bisacodyl (DULCOLAX) 10 MG suppository Place 10 mg rectally daily as needed for ConstipationHistorical Med      Sodium Phosphates (FLEET) 7-19 GM/118ML Place 1 enema rectally daily as neededHistorical Med      !! mineral oil-hydrophilic petrolatum (HYDROPHOR) ointment Apply topically as needed for Dry Skin Apply topically as needed., Topical, PRN, Historical Med      diphenhydrAMINE (BENADRYL) 25 MG tablet Take 1 tablet by mouth every 6 hours as needed for ItchingDC to SNF      !! mineral oil-hydrophilic petrolatum (HYDROPHOR) ointment Apply topically as needed., R-0, DC to SNF      bumetanide (BUMEX) 2 MG tablet Take 1 tablet by mouth 2 times daily, Disp-30 tablet, R-3DC to SNF      polyethylene glycol (GLYCOLAX) packet Take 17 g by mouth daily, Disp-527 g, R-1DC to SNF      magnesium hydroxide (MILK OF MAGNESIA) 400 MG/5ML suspension Take 30 mLs by mouth daily as needed for ConstipationHistorical Med      acetaminophen (TYLENOL) 325 MG tablet Take 650 mg by mouth every 4 hours as needed for Pain or Fever (temp > 100.4) Historical Med      melatonin (RA MELATONIN) 3 MG TABS tablet Take 1 tablet by mouth nightly as needed (insomnia), Disp-30 tablet, R-3Normal      potassium chloride (KLOR-CON M) 20 MEQ extended release tablet Take 1 tablet by mouth 2 times daily, Disp-60 tablet, R-3DC to SNF      docusate sodium (COLACE) 100 MG capsule Take 100 mg by mouth 2 times daily as needed for Constipation Historical Med      Multiple Vitamins-Minerals (ICAPS AREDS 2) CAPS Take 1 capsule by mouth 2 times daily 05/23Historical Med      levothyroxine (SYNTHROID) 125 MCG tablet Take 1 tablet by mouth Daily, Disp-90 tablet, R-3Normal       !! - Potential duplicate medications found. Please discuss with provider.       STOP taking these medications

## 2018-08-02 PROBLEM — R77.8 ELEVATED TROPONIN: Status: RESOLVED | Noted: 2018-01-01 | Resolved: 2018-01-01

## 2018-08-02 PROBLEM — N39.0 UTI (URINARY TRACT INFECTION): Status: RESOLVED | Noted: 2018-01-01 | Resolved: 2018-01-01

## 2019-06-10 NOTE — PROGRESS NOTES
Advanced Heart Failure and Pulmonary Hypertension  Inpatient Progress Note          CC/ID: end stage CM, ADHF, cardiogenic shock, VT/AF, CRT-P, severe secondary MR        24-hour events/subjective:  -no overnight events  -official decision to proceed with hospice  -patient and family and SW planning this all now      Physical Exam:    /63   Pulse 112   Temp 97.4 °F (36.3 °C) (Temporal)   Resp 16   Ht 5' 10\" (1.778 m)   Wt 183 lb (83 kg)   SpO2 98%   BMI 26.26 kg/m²   Wt Readings from Last 3 Encounters:   07/22/18 183 lb (83 kg)   07/15/18 175 lb 4.8 oz (79.5 kg)   07/03/18 181 lb (82.1 kg)     Appearance: Awake, alert, no acute respiratory distress  Skin: Intact, no rash  Head: Normocephalic, atraumatic  Eyes: EOMI, no conjunctival erythema, anicteric sclerae  ENMT: No pharyngeal erythema, MMM, no rhinorrhea  Neck: Soft, supple, JVD with a JVP 12  Lungs: Clear to auscultation bilaterally. No wheezes, rales, or rhonchi.   Cardiac: Regular rate and rhythm, +S1S2, no murmurs apparent  Abdomen: Soft, nontender, +bowel sounds  Extremities: Moves all extremities x 4, edema  Neurologic: No focal motor deficits apparent, normal mood and affect  Peripheral Pulses: Intact posterior tibial pulses bilaterally            Laboratory Tests:    Lab Results   Component Value Date    CREATININE 3.5 (H) 07/21/2018    BUN 93 (H) 07/21/2018     07/21/2018    K 3.9 07/21/2018    CL 91 (L) 07/21/2018    CO2 31 (H) 07/21/2018     Lab Results   Component Value Date    MG 2.2 07/21/2018     Lab Results   Component Value Date    WBC 5.4 07/21/2018    HGB 7.2 (L) 07/21/2018    HCT 23.2 (L) 07/21/2018    MCV 97.1 07/21/2018     07/21/2018     Lab Results   Component Value Date    CKTOTAL 41 07/03/2018    CKMB 2.4 06/07/2018    TROPONINI 0.21 (H) 07/20/2018     Lab Results   Component Value Date    INR 1.5 07/12/2018    INR 1.7 06/07/2018    INR 1.9 05/29/2018    PROTIME 16.8 (H) 07/12/2018    PROTIME 18.8 (H) Pt requesting to know if the letter he had spoken to BARBI Jimenez about is completed.     Pt can be reached on his cell phone today. He is not sure his vm is set up but you can leave a detailed message if it is.   Telephone Information:   Mobile [phone]       see # 7 below  3. VT versus AF  4. Severe functional MR  5. Acute on chronic renal insufficiency:  6. CRT-P in place  7. Hypochromic anemia:  8. Atrial fibrillation  -may be affecting frewuency of CRT P       I know patient and her family Johanna Chopra and Perico Estrella and met Rosey Pineda once (they are all her DPOAs, all nieces and nephews). This is now her third hospitalization in the past couple months for end stage nonischemic cardiomyopathy with severe functional mitral regurgitation, and > tenth admission within the past year (if not more). We have exhausted all treatment modalities at this point, or feel collectively that even with a low risk procedure (for e.g. AVN ablation) that it would not palliate her for very long given such severe LV dilatation and recurrent ADHF.      Hospice being set up currently. Stop alll gtts but dobutamine. Dobutamine to stop on discharge to General acute hospital. D/C [place].        I spent > 35 minutes time discussing all the above with patient and her family. Thank you for allowing us to participate in the care of this patient. We will follow as medical course develops. Do not hesitate to contact us with further questions. Erasmo Soler M.D.   Advanced Heart Failure and Pulmonary Hypertension  Mobile [phone]